# Patient Record
Sex: FEMALE | Race: WHITE | NOT HISPANIC OR LATINO | Employment: OTHER | URBAN - METROPOLITAN AREA
[De-identification: names, ages, dates, MRNs, and addresses within clinical notes are randomized per-mention and may not be internally consistent; named-entity substitution may affect disease eponyms.]

---

## 2017-08-14 ENCOUNTER — TELEPHONE (OUTPATIENT)
Dept: FAMILY MEDICINE | Facility: CLINIC | Age: 57
End: 2017-08-14

## 2017-08-14 NOTE — TELEPHONE ENCOUNTER
8/14/2017    Call Regarding Preventive Health Screening Mammogram    Attempt 1    Message on voicemail     Comments:           Outreach   Ileana Lopez

## 2017-08-30 ENCOUNTER — OFFICE VISIT (OUTPATIENT)
Dept: FAMILY MEDICINE | Facility: CLINIC | Age: 57
End: 2017-08-30
Payer: COMMERCIAL

## 2017-08-30 ENCOUNTER — HOSPITAL ENCOUNTER (OUTPATIENT)
Dept: MAMMOGRAPHY | Facility: CLINIC | Age: 57
Discharge: HOME | End: 2017-08-30
Attending: PHYSICIAN ASSISTANT | Admitting: PHYSICIAN ASSISTANT
Payer: COMMERCIAL

## 2017-08-30 VITALS
RESPIRATION RATE: 18 BRPM | TEMPERATURE: 98.7 F | BODY MASS INDEX: 35.61 KG/M2 | HEART RATE: 88 BPM | SYSTOLIC BLOOD PRESSURE: 122 MMHG | HEIGHT: 68 IN | WEIGHT: 235 LBS | OXYGEN SATURATION: 98 % | DIASTOLIC BLOOD PRESSURE: 82 MMHG

## 2017-08-30 DIAGNOSIS — Z98.84 S/P GASTRIC BYPASS: ICD-10-CM

## 2017-08-30 DIAGNOSIS — Z12.31 ENCOUNTER FOR SCREENING MAMMOGRAM FOR BREAST CANCER: ICD-10-CM

## 2017-08-30 DIAGNOSIS — Z12.11 SPECIAL SCREENING FOR MALIGNANT NEOPLASMS, COLON: ICD-10-CM

## 2017-08-30 DIAGNOSIS — D51.9 ANEMIA DUE TO VITAMIN B12 DEFICIENCY, UNSPECIFIED B12 DEFICIENCY TYPE: ICD-10-CM

## 2017-08-30 DIAGNOSIS — E66.01 SEVERE OBESITY (BMI 35.0-39.9): ICD-10-CM

## 2017-08-30 DIAGNOSIS — Z00.00 ENCOUNTER FOR GENERAL ADULT MEDICAL EXAMINATION WITHOUT ABNORMAL FINDINGS: Primary | ICD-10-CM

## 2017-08-30 DIAGNOSIS — I10 ESSENTIAL HYPERTENSION WITH GOAL BLOOD PRESSURE LESS THAN 140/90: ICD-10-CM

## 2017-08-30 PROCEDURE — G0202 SCR MAMMO BI INCL CAD: HCPCS

## 2017-08-30 PROCEDURE — 99396 PREV VISIT EST AGE 40-64: CPT | Performed by: PHYSICIAN ASSISTANT

## 2017-08-30 RX ORDER — LISINOPRIL 10 MG/1
10 TABLET ORAL DAILY
Qty: 90 TABLET | Refills: 3 | Status: SHIPPED | OUTPATIENT
Start: 2017-08-30 | End: 2018-09-27

## 2017-08-30 RX ORDER — CYANOCOBALAMIN 1000 UG/ML
INJECTION, SOLUTION INTRAMUSCULAR; SUBCUTANEOUS
Qty: 1 ML | Refills: 11 | Status: SHIPPED | OUTPATIENT
Start: 2017-08-30 | End: 2018-09-11

## 2017-08-30 ASSESSMENT — PAIN SCALES - GENERAL: PAINLEVEL: NO PAIN (0)

## 2017-08-30 NOTE — MR AVS SNAPSHOT
After Visit Summary   8/30/2017    Kristine Cox    MRN: [id number]           Patient Information     Date Of Birth          1960        Visit Information        Provider Department      8/30/2017 2:30 PM Suzanna Esparza PA-C [facility]        Today's Diagnoses     Encounter for screening mammogram for breast cancer    -  1    Essential hypertension with goal blood pressure less than 140/90        Anemia due to vitamin B12 deficiency, unspecified B12 deficiency type        S/P gastric bypass        Special screening for malignant neoplasms, colon          Care Instructions      Preventive Health Recommendations  Female Ages 50 - 64    Yearly exam: See your health care provider every year in order to  o Review health changes.   o Discuss preventive care.    o Review your medicines if your doctor has prescribed any.      Get a Pap test every three years (unless you have an abnormal result and your provider advises testing more often).    If you get Pap tests with HPV test, you only need to test every 5 years, unless you have an abnormal result.     You do not need a Pap test if your uterus was removed (hysterectomy) and you have not had cancer.    You should be tested each year for STDs (sexually transmitted diseases) if you're at risk.     Have a mammogram every 1 to 2 years.    Have a colonoscopy at age 50, or have a yearly FIT test (stool test). These exams screen for colon cancer.      Have a cholesterol test every 5 years, or more often if advised.    Have a diabetes test (fasting glucose) every three years. If you are at risk for diabetes, you should have this test more often.     If you are at risk for osteoporosis (brittle bone disease), think about having a bone density scan (DEXA).    Shots: Get a flu shot each year. Get a tetanus shot every 10 years.    Nutrition:     Eat at least 5 servings of fruits and vegetables each day.    Eat whole-grain bread, whole-wheat  pasta and brown rice instead of white grains and rice.    Talk to your provider about Calcium and Vitamin D.     Lifestyle    Exercise at least 150 minutes a week (30 minutes a day, 5 days a week). This will help you control your weight and prevent disease.    Limit alcohol to one drink per day.    No smoking.     Wear sunscreen to prevent skin cancer.     See your dentist every six months for an exam and cleaning.    See your eye doctor every 1 to 2 years.            Follow-ups after your visit        Additional Services     GASTROENTEROLOGY ADULT REF PROCEDURE ONLY       Last Lab Result: Creatinine (mg/dL)       Date                     Value                 10/18/2016               0.63             ----------  Body mass index is 35.73 kg/(m^2).     Needed:  No  Language:  English    Patient will be contacted to schedule procedure.     Please be aware that coverage of these services is subject to the terms and limitations of your health insurance plan.  Call member services at your health plan with any benefit or coverage questions.  Any procedures must be performed at a [facility] facility OR coordinated by your clinic's referral office.    Please bring the following with you to your appointment:    (1) Any X-Rays, CTs or MRIs which have been performed.  Contact the facility where they were done to arrange for  prior to your scheduled appointment.    (2) List of current medications   (3) This referral request   (4) Any documents/labs given to you for this referral                  Your next 10 appointments already scheduled     Aug 30, 2017  3:30 PM CDT   MA SCREENING DIGITAL BILATERAL with PHMA1   [facility] ([facility])    [address] [place] [id number] [phone]           Do not use any powder, lotion or deodorant under your arms or on your breast. If you do, we will ask you to remove it before your exam.  Wear comfortable, two-piece clothing.  " If you have any allergies, tell your care team.  Bring any previous mammograms from other facilities or have them mailed to the breast center.              Future tests that were ordered for you today     Open Future Orders        Priority Expected Expires Ordered    *MA Screening Digital Bilateral Routine  2018            Who to contact     If you have questions or need follow up information about today's clinic visit or your schedule please contact [facility] directly at [phone].  Normal or non-critical lab and imaging results will be communicated to you by [employer], letter or phone within 4 business days after the clinic has received the results. If you do not hear from us within 7 days, please contact the clinic through [employer] or phone. If you have a critical or abnormal lab result, we will notify you by phone as soon as possible.  Submit refill requests through [employer] or call your pharmacy and they will forward the refill request to us. Please allow 3 business days for your refill to be completed.          Additional Information About Your Visit        [employer] Information     [employer] lets you send messages to your doctor, view your test results, renew your prescriptions, schedule appointments and more. To sign up, go to [email or web address] . Click on \"Log in\" on the left side of the screen, which will take you to the Welcome page. Then click on \"Sign up Now\" on the right side of the page.     You will be asked to enter the access code listed below, as well as some personal information. Please follow the directions to create your username and password.     Your access code is: [email or web address]  Expires: 2017  3:05 PM     Your access code will  in 90 days. If you need help or a new code, please call your [place] clinic or [phone].        Care EveryWhere ID     This is your Care EveryWhere ID. This could be used by other organizations to access your [place] " "medical records  [id number]        Your Vitals Were     Pulse Temperature Respirations Height Pulse Oximetry BMI (Body Mass Index)    88 98.7  F (37.1  C) (Tympanic) 18 5' 8\" (1.727 m) 98% 35.73 kg/m2       Blood Pressure from Last 3 Encounters:   08/30/17 122/82   10/25/16 138/74   10/10/16 134/82    Weight from Last 3 Encounters:   08/30/17 235 lb (106.6 kg)   10/10/16 227 lb (103 kg)   09/08/15 216 lb (98 kg)              We Performed the Following     GASTROENTEROLOGY ADULT REF PROCEDURE ONLY          Where to get your medicines      These medications were sent to [facility] [place], [place] - [address]  [address] , [facility] [address]     Phone:  [phone]     cyanocobalamin 1000 MCG/ML injection    lisinopril 10 MG tablet          Primary Care Provider Office Phone # Fax #    Suzanna Esparza PA-C [phone] [address] [place] [address]        Equal Access to Services     [facility]: Hadii aad ku hadasho Soomaali, waaxda luqadaha, qaybta kaalmada adeegyada, kingston alonso . So [facility] [phone].    ATENCIÓN: Si habla español, tiene a mills disposición servicios gratuitos de asistencia lingüística. JuanK[facility]tering Health [facility] [phone].    We comply with applicable federal civil rights laws and [place] laws. We do not discriminate on the basis of race, color, national origin, age, disability sex, sexual orientation or gender identity.            Thank you!     Thank you for choosing [facility]  for your care. Our goal is always to provide you with excellent care. Hearing back from our patients is one way we can continue to improve our services. Please take a few minutes to complete the written survey that you may receive in the mail after your visit with us. Thank you!             Your Updated Medication List - Protect others around you: Learn how to safely use, store and throw away your medicines at www.disposemymeds.org.        "   This list is accurate as of: 8/30/17  3:05 PM.  Always use your most recent med list.                   Brand Name Dispense Instructions for use Diagnosis    aspirin 81 MG tablet      Take 1 tablet by mouth daily.        calcium-vitamin D 600-400 MG-UNIT per tablet    CALTRATE     Take 2 tablets by mouth daily.        cholecalciferol 1000 UNITS capsule    vitamin  -D     Take 2 capsules by mouth daily.        cyanocobalamin 1000 MCG/ML injection    VITAMIN B12    1 mL    INJECT 1ML AS DIRECTED ONCE EVERY 30 DAYS    Anemia due to vitamin B12 deficiency, unspecified B12 deficiency type, S/P gastric bypass       docusate sodium 100 MG capsule    COLACE    30 capsule    Take 1 capsule (100 mg) by mouth daily    Constipation       lisinopril 10 MG tablet    PRINIVIL/ZESTRIL    90 tablet    Take 1 tablet (10 mg) by mouth daily    Essential hypertension with goal blood pressure less than 140/90

## 2017-08-30 NOTE — NURSING NOTE
"Chief Complaint   Patient presents with     Physical       Initial /82  Pulse 88  Temp 98.7  F (37.1  C) (Tympanic)  Resp 18  Ht 5' 8\" (1.727 m)  Wt 235 lb (106.6 kg)  SpO2 98%  BMI 35.73 kg/m2 Estimated body mass index is 35.73 kg/(m^2) as calculated from the following:    Height as of this encounter: 5' 8\" (1.727 m).    Weight as of this encounter: 235 lb (106.6 kg).  BP completed using cuff size: yash Patiño MA      "

## 2017-08-30 NOTE — PROGRESS NOTES
SUBJECTIVE:   CC: Kristine Cox is an 57 year old woman who presents for preventive health visit.     Healthy Habits:    Do you get at least three servings of calcium containing foods daily (dairy, green leafy vegetables, etc.)? no, taking calcium and/or vitamin D supplement: yes -     Amount of exercise or daily activities, outside of work: none    Problems taking medications regularly No    Medication side effects: No    Have you had an eye exam in the past two years? yes    Do you see a dentist twice per year? no    Do you have sleep apnea, excessive snoring or daytime drowsiness?no          Hypertension Follow-up  On lisinopril 10 mg daily-no side effects to the medication-stable.      Outpatient blood pressures are not being checked.    Low Salt Diet: not monitoring salt    ANEMIA B-12 DEFICIENCY  As had ongoing issues with anemia due to history of gastric bypass. Does self injections monthly of vitamin B 12. Due for labs today.      Today's PHQ-2 Score:   PHQ-2 ( 1999 Pfizer) 8/30/2017 6/23/2015   Q1: Little interest or pleasure in doing things 0 3   Q2: Feeling down, depressed or hopeless 0 2   PHQ-2 Score 0 5         Abuse: Current or Past(Physical, Sexual or Emotional)- No  Do you feel safe in your environment - Yes  Social History   Substance Use Topics     Smoking status: Current Every Day Smoker     Packs/day: 0.50     Smokeless tobacco: Never Used     Alcohol use Yes      Comment: 4 -6 drinks daily     The patient does not drink >3 drinks per day nor >7 drinks per week.    Reviewed orders with patient.  Reviewed health maintenance and updated orders accordingly - Yes  Patient Active Problem List   Diagnosis     CARDIOVASCULAR SCREENING; LDL GOAL LESS THAN 160     HTN, goal below 140/90     Vaginal atrophy     S/P gastric bypass     History of cervical spinal surgery     Lesion of sciatic nerve, right     Lumbosacral radiculopathy     Cervicalgia     B12 deficiency anemia     Lumbar foraminal  stenosis     Protruded cervical disc     Piriformis syndrome, right     Low serum sodium     Tobacco abuse     Severe obesity (BMI 35.0-39.9) (H)     Past Surgical History:   Procedure Laterality Date     C EXCIS KNEE CARTILAGE,MEDIAL OR LAT  07/02/07    right knee     CHOLECYSTECTOMY       COLONOSCOPY  9/20/2011    Procedure:COMBINED COLONOSCOPY, REMOVE TUMOR/POLYP/LESION BY SNARE; Colonoscopy with polypectomy by snare; Surgeon:TERESO LIANG; Location:[facility]     DAVINCI BYPASS GASTRIC JOSHUA-EN-Y  1993     DISCECTOMY CERVICAL MINIMALLY INVASIVE TWO LEVELS       FUSION CERVICAL ANTERIOR TWO LEVELS      C5-7     HC LIGMT REVISION,KNEE,INTRA-ARTIC  07/02/07     HYSTERECTOMY VAGINAL       TONSILLECTOMY         Social History   Substance Use Topics     Smoking status: Current Every Day Smoker     Packs/day: 0.50     Smokeless tobacco: Never Used     Alcohol use Yes      Comment: 4 -6 drinks daily     Family History   Problem Relation Age of Onset     HEART DISEASE Father      heart murmur and CHF     Hypertension Brother      HEART DISEASE Brother      stents     Thyroid Disease Mother      hypothyroid     DIABETES Mother      Hypertension Sister              Patient over age 50, mutual decision to screen reflected in health maintenance.      Pertinent mammograms are reviewed under the imaging tab.  History of abnormal Pap smear: Status post benign hysterectomy. Health Maintenance and Surgical History updated.    [facility]  [facility]         Reviewed and updated as needed this visit by Provider            ROS:  C: NEGATIVE for fever, chills, change in weight  I: NEGATIVE for worrisome rashes, moles or lesions  E: NEGATIVE for vision changes or irritation  ENT: NEGATIVE for ear, mouth and throat problems  R: NEGATIVE for significant cough or SOB  B: NEGATIVE for masses, tenderness or discharge  CV: NEGATIVE for chest pain, palpitations or peripheral edema  GI:  "NEGATIVE for nausea, abdominal pain, heartburn, or change in bowel habits  : NEGATIVE for unusual urinary or vaginal symptoms. No vaginal bleeding.  M: NEGATIVE for significant arthralgias or myalgia  N: NEGATIVE for weakness, dizziness or paresthesias  E: NEGATIVE for temperature intolerance, skin/hair changes  H: NEGATIVE for bleeding problems  P: NEGATIVE for changes in mood or affect     OBJECTIVE:   /82  Pulse 88  Temp 98.7  F (37.1  C) (Tympanic)  Resp 18  Ht 5' 8\" (1.727 m)  Wt 235 lb (106.6 kg)  SpO2 98%  BMI 35.73 kg/m2  EXAM:  GENERAL: alert, no distress and obese  EYES: Eyes grossly normal to inspection, PERRL and conjunctivae and sclerae normal  HENT: ear canals and TM's normal, nose and mouth without ulcers or lesions  NECK: no adenopathy, no asymmetry, masses, or scars and thyroid normal to palpation  RESP: lungs clear to auscultation - no rales, rhonchi or wheezes  BREAST: Breast exam declined by patient today.    CV: regular rate and rhythm, normal S1 S2, no S3 or S4, no murmur, click or rub, no peripheral edema and peripheral pulses strong  ABDOMEN: soft, nontender, no hepatosplenomegaly, no masses and bowel sounds normal   (female): deferred  MS: no gross musculoskeletal defects noted, no edema  SKIN: no suspicious lesions or rashes  NEURO: Normal strength and tone, mentation intact and speech normal  PSYCH: mentation appears normal, affect normal/bright    ASSESSMENT/PLAN:       ICD-10-CM    1. Encounter for general adult medical examination without abnormal findings Z00.00    2. Essential hypertension with goal blood pressure less than 140/90 I10 lisinopril (PRINIVIL/ZESTRIL) 10 MG tablet     Comprehensive metabolic panel     CBC with platelets differential     Vitamin D Deficiency     Vitamin B12     Ferritin     Iron     Albumin Random Urine Quantitative with Creat Ratio   3. Anemia due to vitamin B12 deficiency, unspecified B12 deficiency type D51.9 cyanocobalamin (VITAMIN " "B12) 1000 MCG/ML injection   4. S/P gastric bypass Z98.84 cyanocobalamin (VITAMIN B12) 1000 MCG/ML injection     Comprehensive metabolic panel     CBC with platelets differential     Vitamin D Deficiency     Vitamin B12     Ferritin     Iron     Albumin Random Urine Quantitative with Creat Ratio   5. Encounter for screening mammogram for breast cancer Z12.31 *MA Screening Digital Bilateral   6. Special screening for malignant neoplasms, colon Z12.11 GASTROENTEROLOGY ADULT REF PROCEDURE ONLY   7. Severe obesity (BMI 35.0-39.9) (H) E66.01        COUNSELING:   Reviewed preventive health counseling, as reflected in patient instructions       Regular exercise       Healthy diet/nutrition       Osteoporosis Prevention/Bone Health         reports that she has been smoking.  She has been smoking about 0.50 packs per day. She has never used smokeless tobacco.  Tobacco Cessation Action Plan: Information offered: Patient not interested at this time  Estimated body mass index is 34.26 kg/(m^2) as calculated from the following:    Height as of 9/8/15: 5' 8.25\" (1.734 m).    Weight as of 10/10/16: 227 lb (103 kg).   Weight management plan: Discussed healthy diet and exercise guidelines and patient will follow up in 12 months in clinic to re-evaluate.     Refill lisinopril and vitamin B-12 injections. Patient is not fasting today, suggested she return for fasting labs so that these are more accurate.    Counseling Resources:  ATP IV Guidelines  Pooled Cohorts Equation Calculator  Breast Cancer Risk Calculator  FRAX Risk Assessment  ICSI Preventive Guidelines  Dietary Guidelines for Americans, 2010  USDA's MyPlate  ASA Prophylaxis  Lung CA Screening    Suzanna Esparza PA-C  [facility]    Orders Placed This Encounter     *MA Screening Digital Bilateral     Comprehensive metabolic panel     CBC with platelets differential     Vitamin D Deficiency     Vitamin B12     Ferritin     Iron     Albumin Random Urine " Quantitative with Creat Ratio     GASTROENTEROLOGY ADULT REF PROCEDURE ONLY     lisinopril (PRINIVIL/ZESTRIL) 10 MG tablet     cyanocobalamin (VITAMIN B12) 1000 MCG/ML injection       AVS given to patient upon discharge today.  Electronically signed by Suzanna Esparza PA-C  August 30, 2017  5:25 PM

## 2017-08-31 ENCOUNTER — TELEPHONE (OUTPATIENT)
Dept: FAMILY MEDICINE | Facility: CLINIC | Age: 57
End: 2017-08-31

## 2017-08-31 DIAGNOSIS — Z12.11 SPECIAL SCREENING FOR MALIGNANT NEOPLASMS, COLON: Primary | ICD-10-CM

## 2017-08-31 NOTE — TELEPHONE ENCOUNTER
Reason for Call: Request for an order or referral:    Order or referral being requested: There is a colonoscopy order placed but patient has [employer] insurance and she wouldn't get as good of coverage seeing [facility]- Can the order be changed to see a [place] Provider?    Date needed: as soon as possible    Has the patient been seen by the PCP for this problem? YES    Additional comments: none    Phone number Patient can be reached at:  Home number on file [phone] (home)    Best Time:  any    Can we leave a detailed message on this number?  YES    Call taken on 8/31/2017 at 11:36 AM by Neli Yang

## 2017-09-01 DIAGNOSIS — I10 ESSENTIAL HYPERTENSION WITH GOAL BLOOD PRESSURE LESS THAN 140/90: ICD-10-CM

## 2017-09-01 DIAGNOSIS — Z98.84 S/P GASTRIC BYPASS: ICD-10-CM

## 2017-09-01 LAB
ALBUMIN SERPL-MCNC: 3.9 G/DL (ref 3.4–5)
ALP SERPL-CCNC: 105 U/L (ref 40–150)
ALT SERPL W P-5'-P-CCNC: 20 U/L (ref 0–50)
ANION GAP SERPL CALCULATED.3IONS-SCNC: 11 MMOL/L (ref 3–14)
AST SERPL W P-5'-P-CCNC: 22 U/L (ref 0–45)
BASOPHILS # BLD AUTO: 0.1 10E9/L (ref 0–0.2)
BASOPHILS NFR BLD AUTO: 1.2 %
BILIRUB SERPL-MCNC: 0.5 MG/DL (ref 0.2–1.3)
BUN SERPL-MCNC: 6 MG/DL (ref 7–30)
CALCIUM SERPL-MCNC: 9.5 MG/DL (ref 8.5–10.1)
CHLORIDE SERPL-SCNC: 99 MMOL/L (ref 94–109)
CO2 SERPL-SCNC: 27 MMOL/L (ref 20–32)
CREAT SERPL-MCNC: 0.64 MG/DL (ref 0.52–1.04)
CREAT UR-MCNC: 30 MG/DL
DEPRECATED CALCIDIOL+CALCIFEROL SERPL-MC: 36 UG/L (ref 20–75)
DIFFERENTIAL METHOD BLD: NORMAL
EOSINOPHIL # BLD AUTO: 0.3 10E9/L (ref 0–0.7)
EOSINOPHIL NFR BLD AUTO: 4.8 %
ERYTHROCYTE [DISTWIDTH] IN BLOOD BY AUTOMATED COUNT: 12.4 % (ref 10–15)
FERRITIN SERPL-MCNC: 19 NG/ML (ref 8–252)
GFR SERPL CREATININE-BSD FRML MDRD: >90 ML/MIN/1.7M2
GLUCOSE SERPL-MCNC: 89 MG/DL (ref 70–99)
HCT VFR BLD AUTO: 42 % (ref 35–47)
HGB BLD-MCNC: 14 G/DL (ref 11.7–15.7)
IRON SERPL-MCNC: 173 UG/DL (ref 35–180)
LYMPHOCYTES # BLD AUTO: 1.9 10E9/L (ref 0.8–5.3)
LYMPHOCYTES NFR BLD AUTO: 31.8 %
MCH RBC QN AUTO: 30.8 PG (ref 26.5–33)
MCHC RBC AUTO-ENTMCNC: 33.3 G/DL (ref 31.5–36.5)
MCV RBC AUTO: 92 FL (ref 78–100)
MICROALBUMIN UR-MCNC: <5 MG/L
MICROALBUMIN/CREAT UR: NORMAL MG/G CR (ref 0–25)
MONOCYTES # BLD AUTO: 0.4 10E9/L (ref 0–1.3)
MONOCYTES NFR BLD AUTO: 6 %
NEUTROPHILS # BLD AUTO: 3.4 10E9/L (ref 1.6–8.3)
NEUTROPHILS NFR BLD AUTO: 56.2 %
PLATELET # BLD AUTO: 302 10E9/L (ref 150–450)
POTASSIUM SERPL-SCNC: 4.3 MMOL/L (ref 3.4–5.3)
PROT SERPL-MCNC: 7.7 G/DL (ref 6.8–8.8)
RBC # BLD AUTO: 4.55 10E12/L (ref 3.8–5.2)
SODIUM SERPL-SCNC: 137 MMOL/L (ref 133–144)
VIT B12 SERPL-MCNC: 487 PG/ML (ref 193–986)
WBC # BLD AUTO: 6 10E9/L (ref 4–11)

## 2017-09-01 PROCEDURE — 82043 UR ALBUMIN QUANTITATIVE: CPT | Performed by: PHYSICIAN ASSISTANT

## 2017-09-01 PROCEDURE — 82306 VITAMIN D 25 HYDROXY: CPT | Performed by: PHYSICIAN ASSISTANT

## 2017-09-01 PROCEDURE — 83540 ASSAY OF IRON: CPT | Performed by: PHYSICIAN ASSISTANT

## 2017-09-01 PROCEDURE — 36415 COLL VENOUS BLD VENIPUNCTURE: CPT | Performed by: PHYSICIAN ASSISTANT

## 2017-09-01 PROCEDURE — 80053 COMPREHEN METABOLIC PANEL: CPT | Performed by: PHYSICIAN ASSISTANT

## 2017-09-01 PROCEDURE — 82728 ASSAY OF FERRITIN: CPT | Performed by: PHYSICIAN ASSISTANT

## 2017-09-01 PROCEDURE — 85025 COMPLETE CBC W/AUTO DIFF WBC: CPT | Performed by: PHYSICIAN ASSISTANT

## 2017-09-01 PROCEDURE — 82607 VITAMIN B-12: CPT | Performed by: PHYSICIAN ASSISTANT

## 2017-09-06 ENCOUNTER — TELEPHONE (OUTPATIENT)
Dept: FAMILY MEDICINE | Facility: CLINIC | Age: 57
End: 2017-09-06

## 2017-09-18 ENCOUNTER — ANESTHESIA EVENT (OUTPATIENT)
Dept: GASTROENTEROLOGY | Facility: CLINIC | Age: 57
End: 2017-09-18
Payer: COMMERCIAL

## 2017-09-18 ENCOUNTER — ANESTHESIA (OUTPATIENT)
Dept: GASTROENTEROLOGY | Facility: CLINIC | Age: 57
End: 2017-09-18
Payer: COMMERCIAL

## 2017-09-18 ENCOUNTER — SURGERY (OUTPATIENT)
Age: 57
End: 2017-09-18

## 2017-09-18 ENCOUNTER — HOSPITAL ENCOUNTER (OUTPATIENT)
Facility: CLINIC | Age: 57
Discharge: HOME | End: 2017-09-18
Attending: FAMILY MEDICINE | Admitting: FAMILY MEDICINE
Payer: COMMERCIAL

## 2017-09-18 VITALS
SYSTOLIC BLOOD PRESSURE: 133 MMHG | RESPIRATION RATE: 20 BRPM | TEMPERATURE: 97.5 F | DIASTOLIC BLOOD PRESSURE: 86 MMHG | OXYGEN SATURATION: 100 %

## 2017-09-18 LAB — COLONOSCOPY: NORMAL

## 2017-09-18 PROCEDURE — 45385 COLONOSCOPY W/LESION REMOVAL: CPT | Performed by: FAMILY MEDICINE

## 2017-09-18 PROCEDURE — 25000128 H RX IP 250 OP 636: Performed by: NURSE ANESTHETIST, CERTIFIED REGISTERED

## 2017-09-18 PROCEDURE — 45380 COLONOSCOPY AND BIOPSY: CPT | Performed by: FAMILY MEDICINE

## 2017-09-18 PROCEDURE — 45385 COLONOSCOPY W/LESION REMOVAL: CPT | Mod: PT | Performed by: FAMILY MEDICINE

## 2017-09-18 PROCEDURE — 25000125 ZZHC RX 250: Performed by: NURSE ANESTHETIST, CERTIFIED REGISTERED

## 2017-09-18 PROCEDURE — 45380 COLONOSCOPY AND BIOPSY: CPT | Mod: 59 | Performed by: FAMILY MEDICINE

## 2017-09-18 PROCEDURE — 88305 TISSUE EXAM BY PATHOLOGIST: CPT | Performed by: FAMILY MEDICINE

## 2017-09-18 PROCEDURE — 25000125 ZZHC RX 250: Performed by: FAMILY MEDICINE

## 2017-09-18 PROCEDURE — 40000296 ZZH STATISTIC ENDO RECOVERY CLASS 1:2 FIRST HOUR: Performed by: FAMILY MEDICINE

## 2017-09-18 PROCEDURE — 88305 TISSUE EXAM BY PATHOLOGIST: CPT | Mod: 26 | Performed by: FAMILY MEDICINE

## 2017-09-18 RX ORDER — SODIUM CHLORIDE, SODIUM LACTATE, POTASSIUM CHLORIDE, CALCIUM CHLORIDE 600; 310; 30; 20 MG/100ML; MG/100ML; MG/100ML; MG/100ML
INJECTION, SOLUTION INTRAVENOUS CONTINUOUS
Status: DISCONTINUED | OUTPATIENT
Start: 2017-09-18 | End: 2017-09-18 | Stop reason: HOSPADM

## 2017-09-18 RX ORDER — ONDANSETRON 4 MG/1
4 TABLET, ORALLY DISINTEGRATING ORAL EVERY 30 MIN PRN
Status: DISCONTINUED | OUTPATIENT
Start: 2017-09-18 | End: 2017-09-18 | Stop reason: HOSPADM

## 2017-09-18 RX ORDER — LIDOCAINE 40 MG/G
CREAM TOPICAL
Status: DISCONTINUED | OUTPATIENT
Start: 2017-09-18 | End: 2017-09-18 | Stop reason: HOSPADM

## 2017-09-18 RX ORDER — PROPOFOL 10 MG/ML
INJECTION, EMULSION INTRAVENOUS PRN
Status: DISCONTINUED | OUTPATIENT
Start: 2017-09-18 | End: 2017-09-18

## 2017-09-18 RX ORDER — ONDANSETRON 2 MG/ML
4 INJECTION INTRAMUSCULAR; INTRAVENOUS EVERY 30 MIN PRN
Status: DISCONTINUED | OUTPATIENT
Start: 2017-09-18 | End: 2017-09-18 | Stop reason: HOSPADM

## 2017-09-18 RX ORDER — LIDOCAINE HYDROCHLORIDE 20 MG/ML
INJECTION, SOLUTION INFILTRATION; PERINEURAL PRN
Status: DISCONTINUED | OUTPATIENT
Start: 2017-09-18 | End: 2017-09-18

## 2017-09-18 RX ORDER — ONDANSETRON 2 MG/ML
4 INJECTION INTRAMUSCULAR; INTRAVENOUS
Status: DISCONTINUED | OUTPATIENT
Start: 2017-09-18 | End: 2017-09-18 | Stop reason: HOSPADM

## 2017-09-18 RX ORDER — PROPOFOL 10 MG/ML
INJECTION, EMULSION INTRAVENOUS CONTINUOUS PRN
Status: DISCONTINUED | OUTPATIENT
Start: 2017-09-18 | End: 2017-09-18

## 2017-09-18 RX ORDER — FENTANYL CITRATE 50 UG/ML
25-50 INJECTION, SOLUTION INTRAMUSCULAR; INTRAVENOUS
Status: DISCONTINUED | OUTPATIENT
Start: 2017-09-18 | End: 2017-09-18 | Stop reason: HOSPADM

## 2017-09-18 RX ADMIN — LIDOCAINE HYDROCHLORIDE 1 ML: 10 INJECTION, SOLUTION EPIDURAL; INFILTRATION; INTRACAUDAL; PERINEURAL at 11:25

## 2017-09-18 RX ADMIN — SODIUM CHLORIDE, POTASSIUM CHLORIDE, SODIUM LACTATE AND CALCIUM CHLORIDE: 600; 310; 30; 20 INJECTION, SOLUTION INTRAVENOUS at 11:25

## 2017-09-18 RX ADMIN — PROPOFOL 150 MCG/KG/MIN: 10 INJECTION, EMULSION INTRAVENOUS at 11:37

## 2017-09-18 RX ADMIN — PROPOFOL 50 MG: 10 INJECTION, EMULSION INTRAVENOUS at 11:37

## 2017-09-18 RX ADMIN — LIDOCAINE HYDROCHLORIDE 80 MG: 20 INJECTION, SOLUTION INFILTRATION; PERINEURAL at 11:37

## 2017-09-18 ASSESSMENT — LIFESTYLE VARIABLES: TOBACCO_USE: 1

## 2017-09-18 NOTE — ANESTHESIA PREPROCEDURE EVALUATION
"  Anesthesia Evaluation     . Pt has had prior anesthetic. Type: General and MAC           ROS/MED HX    ENT/Pulmonary:     (+)tobacco use, Current use , . .    Neurologic:  - neg neurologic ROS     Cardiovascular:     (+) Dyslipidemia, hypertension----. : . . . :. . No previous cardiac testing       METS/Exercise Tolerance:     Hematologic:     (+) Anemia, -      Musculoskeletal: Comment: Previous 2-level cervical fusion  (+) arthritis, , , other musculoskeletal- lumbar/sacral pain/cervical pain      GI/Hepatic:     (+) GERD (heartburn 3-4 times per week, no daily meds for this)       Renal/Genitourinary:  - ROS Renal section negative   (+) Pt has no history of transplant,       Endo:     (+) Obesity, .      Psychiatric:  - neg psychiatric ROS       Infectious Disease:  - neg infectious disease ROS       Malignancy:      - no malignancy   Other:    (+) No chance of pregnancy C-spine cleared: No, no H/O Chronic Pain,                   Physical Exam  Normal systems: cardiovascular, pulmonary and dental    Airway   Mallampati: II  TM distance: >3 FB  Neck ROM: full  Comment: ful ROM but pt stated she sees \"sparklies\" if she holds her head at \"extreme\" limits for an extended amount of time    Dental     Cardiovascular   Rhythm and rate: regular and normal      Pulmonary    breath sounds clear to auscultation                    Anesthesia Plan      History & Physical Review      ASA Status:  3 .    NPO Status:  > 8 hours    Plan for MAC with Intravenous induction. Maintenance will be TIVA.  Reason for MAC:  Deep or markedly invasive procedure (G8)  PONV prophylaxis:  Ondansetron (or other 5HT-3)  Dr James performed H&P in the room      Postoperative Care  Postoperative pain management:  Oral pain medications.      Consents  Anesthetic plan, risks, benefits and alternatives discussed with:  Patient.  Use of blood products discussed: No .   .                          .  "

## 2017-09-18 NOTE — ANESTHESIA POSTPROCEDURE EVALUATION
Patient: Kristine Cox    Procedure(s):  Colonoscopy with polypectomy by snare and biopsy. - Wound Class: II-Clean Contaminated   - Wound Class: II-Clean Contaminated    Diagnosis:screening  Diagnosis Additional Information: No value filed.    Anesthesia Type:  MAC    Note:  Anesthesia Post Evaluation    Patient location during evaluation: Phase 2  Patient participation: Able to fully participate in evaluation  Level of consciousness: awake and alert  Pain management: adequate  Cardiovascular status: blood pressure returned to baseline  Respiratory status: spontaneous ventilation and room air  Hydration status: acceptable  PONV: none     Anesthetic complications: None    Comments: Patient happy with her anesthetic.  No anesthesia complications or concerns         Last vitals:  Vitals:    09/18/17 1112 09/18/17 1247 09/18/17 1300   BP: 147/75 135/79 144/78   Resp: 18 16 20   Temp: 97.5  F (36.4  C)     SpO2: 98% 99% 100%         Electronically Signed By: LUZ Churchill CRNA  September 18, 2017  1:09 PM

## 2017-09-18 NOTE — OR NURSING
Verbal report received from Chikis REY RN and Alba MYERS. GI Phase 2 recovery assumed by Nhi DEY. Pt post-op MAC for colonoscopy per . Pt awake and stable upon arrival. Spouse in room and supportive.

## 2017-09-18 NOTE — IP AVS SNAPSHOT
MRN:[id number]                      After Visit Summary   9/18/2017    Kristine Cox    MRN: [id number]           Thank you!     Thank you for choosing [facility] for your care. Our goal is always to provide you with excellent care. Hearing back from our patients is one way we can continue to improve our services. Please take a few minutes to complete the written survey that you may receive in the mail after you visit with us. Thank you!        Patient Information     Date Of Birth          1960        About your hospital stay     You were admitted on:  September 18, 2017 You last received care in the:  [facility]    You were discharged on:  September 18, 2017       Who to Call     For medical emergencies, please call 911.  For non-urgent questions about your medical care, please call your primary care provider or clinic, [phone]  For questions related to your surgery, please call your surgery clinic        Attending Provider     Provider Cale Kellogg MD Family Practice       Primary Care Provider Office Phone # Fax #    Suzanna Esparza PA-C [phone]      Further instructions from your care team         [facility]    Home Care Following Endoscopy    Dr. James      Activity:    You have just undergone an endoscopic procedure usually performed with conscious sedation.  Do not work or operate machinery (including a car) for at least 12 hours.      I encourage you to walk and attempt to pass this air as soon as possible.    Diet:    Return to the diet you were on before your procedure but eat lightly for the first 12-24 hours.    Drink plenty of water.    Resume any regular medications unless otherwise advised by your physician.  Please begin any new medication prescribed as a result of your procedure as directed by your physician.     If you had any biopsy or polyp removed please refrain from aspirin or aspirin  products for 2 days.  If on Coumadin please restart as instructed by your physician.   Pain:    You may take Tylenol as needed for pain.  Expected Recovery:    You can expect some mild abdominal fullness and/or discomfort due to the air used to inflate your intestinal tract. It is also normal to have a mild sore throat after upper endoscopy.    Call Your Physician if You Have:    After Upper Endoscopy:  o Shoulder, back or chest pain.  o Difficulty breathing or swallowing.  o Vomiting blood.    After Colonoscopy:  o Worsening persisting abdominal pain which is worse with activity.  o Fevers (>101 degrees F), chills or shakes.  o Passage of continued blood with bowel movements.   Any questions or concerns about your recovery, please call [phone] or after hours [phone] Nurse Advice Line.    Follow-up Care:    You should receive a call or letter with your results within 1 week. Please call if you have not received a notification of your results.    If asked to return to clinic please make an appointment 1 week after your procedure.  Call [phone].  [facility]   Adult Discharge Orders & Instructions Following Anesthesia    For 24 hours after surgery    1. Get plenty of rest.  A responsible adult must stay with you for at least 24 hours after you leave the hospital.   2. Do not drive or use heavy equipment.  If you have weakness or tingling, don't drive or use heavy equipment until this feeling goes away.  3. Do not drink alcohol.  4. Avoid strenuous or risky activities.  Ask for help when climbing stairs.   5. You may feel lightheaded.  IF so, sit for a few minutes before standing.  Have someone help you get up.   6. If you have nausea (feel sick to your stomach): Drink only clear liquids such as apple juice, ginger ale, broth or 7-Up.  Rest may also help.  Be sure to drink enough fluids.  Move to a regular diet as you feel able.  7. You may have a slight fever. Call the doctor if your fever  "is over 100 F (37.7 C) (taken under the tongue) or lasts longer than 24 hours.  8. You may have a dry mouth, a sore throat, muscle aches or trouble sleeping.  These should go away after 24 hours.  9. Do not make important or legal decisions.   Call your doctor for any of the followin.  Signs of infection (fever, growing tenderness at the surgery site, a large amount of drainage or bleeding, severe pain, foul-smelling drainage, redness, swelling).    2. It has been over 8 to 10 hours since surgery and you are still not able to urinate (pass water).        Pending Results     No orders found from 2017 to 2017.            Admission Information     Date & Time Provider Department Dept. Phone    2017 Cale James MD [facility] [phone]      Your Vitals Were     Blood Pressure Temperature Respirations Pulse Oximetry          144/78 97.5  F (36.4  C) (Oral) 20 100%        MyChart Information     [employer] lets you send messages to your doctor, view your test results, renew your prescriptions, schedule appointments and more. To sign up, go to [email or web address] . Click on \"Log in\" on the left side of the screen, which will take you to the Welcome page. Then click on \"Sign up Now\" on the right side of the page.     You will be asked to enter the access code listed below, as well as some personal information. Please follow the directions to create your username and password.     Your access code is: [email or web address]  Expires: 2017  3:05 PM     Your access code will  in 90 days. If you need help or a new code, please call your [place] clinic or [phone].        Care EveryWhere ID     This is your Care EveryWhere ID. This could be used by other organizations to access your [place] medical records  [id number]        Equal Access to Services     [facility] PRIMITIVO AH: Renuka Griffin, fausto reyes, re raphael, kingston redmond " westley glaseraan ah. So [facility] [place] [facility] [phone].    ATENCIÓN: Si kiet issa, tiene a mills disposición servicios gratuitos de asistencia lingüística. Keke al [phone].    We comply with applicable federal civil rights laws and [place] laws. We do not discriminate on the basis of race, color, national origin, age, disability sex, sexual orientation or gender identity.               Review of your medicines      UNREVIEWED medicines. Ask your doctor about these medicines        Dose / Directions    aspirin 81 MG tablet        Dose:  1 tablet   Take 1 tablet by mouth daily.   Refills:  0       calcium-vitamin D 600-400 MG-UNIT per tablet   Commonly known as:  CALTRATE        Dose:  2 tablet   Take 2 tablets by mouth daily.   Refills:  0       cholecalciferol 1000 UNITS capsule   Commonly known as:  vitamin  -D        Dose:  2 capsule   Take 2 capsules by mouth daily.   Refills:  0       cyanocobalamin 1000 MCG/ML injection   Commonly known as:  VITAMIN B12   Used for:  Anemia due to vitamin B12 deficiency, unspecified B12 deficiency type, S/P gastric bypass        INJECT 1ML AS DIRECTED ONCE EVERY 30 DAYS   Quantity:  1 mL   Refills:  11       docusate sodium 100 MG capsule   Commonly known as:  COLACE   Used for:  Constipation        Dose:  100 mg   Take 1 capsule (100 mg) by mouth daily   Quantity:  30 capsule   Refills:  1       lisinopril 10 MG tablet   Commonly known as:  PRINIVIL/ZESTRIL   Used for:  Essential hypertension with goal blood pressure less than 140/90        Dose:  10 mg   Take 1 tablet (10 mg) by mouth daily   Quantity:  90 tablet   Refills:  3                Protect others around you: Learn how to safely use, store and throw away your medicines at www.disposemymeds.org.             Medication List: This is a list of all your medications and when to take them. Check marks below indicate your daily home schedule. Keep this list as a reference.      Medications           Morning Afternoon Evening  Bedtime As Needed    aspirin 81 MG tablet   Take 1 tablet by mouth daily.                                calcium-vitamin D 600-400 MG-UNIT per tablet   Commonly known as:  CALTRATE   Take 2 tablets by mouth daily.                                cholecalciferol 1000 UNITS capsule   Commonly known as:  vitamin  -D   Take 2 capsules by mouth daily.                                cyanocobalamin 1000 MCG/ML injection   Commonly known as:  VITAMIN B12   INJECT 1ML AS DIRECTED ONCE EVERY 30 DAYS                                docusate sodium 100 MG capsule   Commonly known as:  COLACE   Take 1 capsule (100 mg) by mouth daily                                lisinopril 10 MG tablet   Commonly known as:  PRINIVIL/ZESTRIL   Take 1 tablet (10 mg) by mouth daily

## 2017-09-18 NOTE — IP AVS SNAPSHOT
[facility]    [address] [address]    Phone:  [phone]                                       After Visit Summary   9/18/2017    Kristine Cox    MRN: [id number]           After Visit Summary Signature Page     I have received my discharge instructions, and my questions have been answered. I have discussed any challenges I see with this plan with the nurse or doctor.    ..........................................................................................................................................  Patient/Patient Representative Signature      ..........................................................................................................................................  Patient Representative Print Name and Relationship to Patient    ..................................................               ................................................  Date                                            Time    ..........................................................................................................................................  Reviewed by Signature/Title    ...................................................              ..............................................  Date                                                            Time

## 2017-09-18 NOTE — DISCHARGE INSTRUCTIONS
[facility]    Home Care Following Endoscopy    Dr. James      Activity:    You have just undergone an endoscopic procedure usually performed with conscious sedation.  Do not work or operate machinery (including a car) for at least 12 hours.      I encourage you to walk and attempt to pass this air as soon as possible.    Diet:    Return to the diet you were on before your procedure but eat lightly for the first 12-24 hours.    Drink plenty of water.    Resume any regular medications unless otherwise advised by your physician.  Please begin any new medication prescribed as a result of your procedure as directed by your physician.     If you had any biopsy or polyp removed please refrain from aspirin or aspirin products for 2 days.  If on Coumadin please restart as instructed by your physician.   Pain:    You may take Tylenol as needed for pain.  Expected Recovery:    You can expect some mild abdominal fullness and/or discomfort due to the air used to inflate your intestinal tract. It is also normal to have a mild sore throat after upper endoscopy.    Call Your Physician if You Have:    After Upper Endoscopy:  o Shoulder, back or chest pain.  o Difficulty breathing or swallowing.  o Vomiting blood.    After Colonoscopy:  o Worsening persisting abdominal pain which is worse with activity.  o Fevers (>101 degrees F), chills or shakes.  o Passage of continued blood with bowel movements.   Any questions or concerns about your recovery, please call [phone] or after hours [phone] Nurse Advice Line.    Follow-up Care:    You should receive a call or letter with your results within 1 week. Please call if you have not received a notification of your results.    If asked to return to clinic please make an appointment 1 week after your procedure.  Call [phone].  [facility]   Adult Discharge Orders & Instructions Following Anesthesia    For 24 hours after surgery    1. Get plenty  of rest.  A responsible adult must stay with you for at least 24 hours after you leave the hospital.   2. Do not drive or use heavy equipment.  If you have weakness or tingling, don't drive or use heavy equipment until this feeling goes away.  3. Do not drink alcohol.  4. Avoid strenuous or risky activities.  Ask for help when climbing stairs.   5. You may feel lightheaded.  IF so, sit for a few minutes before standing.  Have someone help you get up.   6. If you have nausea (feel sick to your stomach): Drink only clear liquids such as apple juice, ginger ale, broth or 7-Up.  Rest may also help.  Be sure to drink enough fluids.  Move to a regular diet as you feel able.  7. You may have a slight fever. Call the doctor if your fever is over 100 F (37.7 C) (taken under the tongue) or lasts longer than 24 hours.  8. You may have a dry mouth, a sore throat, muscle aches or trouble sleeping.  These should go away after 24 hours.  9. Do not make important or legal decisions.   Call your doctor for any of the followin.  Signs of infection (fever, growing tenderness at the surgery site, a large amount of drainage or bleeding, severe pain, foul-smelling drainage, redness, swelling).    2. It has been over 8 to 10 hours since surgery and you are still not able to urinate (pass water).

## 2017-09-18 NOTE — ANESTHESIA CARE TRANSFER NOTE
Patient: Kristine Cox    Procedure(s):  Colonoscopy with polypectomy by snare and biopsy. - Wound Class: II-Clean Contaminated   - Wound Class: II-Clean Contaminated    Diagnosis: screening  Diagnosis Additional Information: No value filed.    Anesthesia Type:   MAC     Note:    Patient transferred to:Phase II        Vitals: (Last set prior to Anesthesia Care Transfer)    CRNA VITALS  9/18/2017 1214 - 9/18/2017 1250      9/18/2017             Pulse: 64    SpO2: 100 %    Resp Rate (observed): 13                Electronically Signed By: LUZ Churchill CRNA  September 18, 2017  12:50 PM

## 2017-09-20 LAB — COPATH REPORT: NORMAL

## 2018-09-11 DIAGNOSIS — Z98.84 S/P GASTRIC BYPASS: ICD-10-CM

## 2018-09-11 DIAGNOSIS — D51.9 ANEMIA DUE TO VITAMIN B12 DEFICIENCY, UNSPECIFIED B12 DEFICIENCY TYPE: ICD-10-CM

## 2018-09-11 NOTE — TELEPHONE ENCOUNTER
"Requested Prescriptions   Pending Prescriptions Disp Refills     cyanocobalamin (VITAMIN B12) 1000 MCG/ML injection [Pharmacy Med Name: CYANOCOBALAMIN 1000MCG/ML SOLN] 1 mL 11    Last Written Prescription Date:  8/30/17  Last Fill Quantity: 1 mL,  # refills: 11   Last office visit: 8/30/2017 with prescribing provider:  8/30/17   Future Office Visit:     Sig: INJECT 1 ML AS DIRECTED ONCE EVERY 30 DAYS    Vitamin Supplements (Adult) Protocol Failed    9/11/2018  1:45 PM       Failed - Recent (12 mo) or future (30 days) visit within the authorizing provider's specialty    Patient had office visit in the last 12 months or has a visit in the next 30 days with authorizing provider or within the authorizing provider's specialty.  See \"Patient Info\" tab in inbasket, or \"Choose Columns\" in Meds & Orders section of the refill encounter.           Passed - High dose Vitamin D not ordered          "

## 2018-09-12 NOTE — TELEPHONE ENCOUNTER
Routing refill request to provider for review/approval because:  Labs not current:  Hgb  Patient needs to be seen because it has been more than 1 year since last office visit.  Yris Monson RN, BSN    Hemoglobin   Date Value Ref Range Status   09/01/2017 14.0 11.7 - 15.7 g/dL Final

## 2018-09-13 RX ORDER — CYANOCOBALAMIN 1000 UG/ML
INJECTION, SOLUTION INTRAMUSCULAR; SUBCUTANEOUS
Qty: 1 ML | Refills: 0 | Status: SHIPPED | OUTPATIENT
Start: 2018-09-13 | End: 2018-10-03

## 2018-10-03 ENCOUNTER — OFFICE VISIT (OUTPATIENT)
Dept: FAMILY MEDICINE | Facility: OTHER | Age: 58
End: 2018-10-03
Payer: COMMERCIAL

## 2018-10-03 VITALS
RESPIRATION RATE: 20 BRPM | DIASTOLIC BLOOD PRESSURE: 72 MMHG | HEART RATE: 103 BPM | SYSTOLIC BLOOD PRESSURE: 116 MMHG | TEMPERATURE: 96.8 F | HEIGHT: 68 IN | OXYGEN SATURATION: 98 % | WEIGHT: 241 LBS | BODY MASS INDEX: 36.53 KG/M2

## 2018-10-03 DIAGNOSIS — L98.9 SKIN LESION: ICD-10-CM

## 2018-10-03 DIAGNOSIS — D51.9 ANEMIA DUE TO VITAMIN B12 DEFICIENCY, UNSPECIFIED B12 DEFICIENCY TYPE: ICD-10-CM

## 2018-10-03 DIAGNOSIS — I10 HTN, GOAL BELOW 140/90: ICD-10-CM

## 2018-10-03 DIAGNOSIS — Z83.49 FAMILY HISTORY OF THYROID PROBLEM: ICD-10-CM

## 2018-10-03 DIAGNOSIS — Z76.89 ESTABLISHING CARE WITH NEW DOCTOR, ENCOUNTER FOR: Primary | ICD-10-CM

## 2018-10-03 DIAGNOSIS — Z11.59 NEED FOR HEPATITIS C SCREENING TEST: ICD-10-CM

## 2018-10-03 DIAGNOSIS — Z98.84 S/P GASTRIC BYPASS: ICD-10-CM

## 2018-10-03 LAB
ANION GAP SERPL CALCULATED.3IONS-SCNC: 10 MMOL/L (ref 3–14)
BUN SERPL-MCNC: 10 MG/DL (ref 7–30)
CALCIUM SERPL-MCNC: 9.3 MG/DL (ref 8.5–10.1)
CHLORIDE SERPL-SCNC: 103 MMOL/L (ref 94–109)
CO2 SERPL-SCNC: 25 MMOL/L (ref 20–32)
CREAT SERPL-MCNC: 0.81 MG/DL (ref 0.52–1.04)
CREAT UR-MCNC: 60 MG/DL
GFR SERPL CREATININE-BSD FRML MDRD: 73 ML/MIN/1.7M2
GLUCOSE SERPL-MCNC: 97 MG/DL (ref 70–99)
MICROALBUMIN UR-MCNC: <5 MG/L
MICROALBUMIN/CREAT UR: NORMAL MG/G CR (ref 0–25)
POTASSIUM SERPL-SCNC: 4.3 MMOL/L (ref 3.4–5.3)
SODIUM SERPL-SCNC: 138 MMOL/L (ref 133–144)
TSH SERPL DL<=0.005 MIU/L-ACNC: 0.75 MU/L (ref 0.4–4)

## 2018-10-03 PROCEDURE — 82043 UR ALBUMIN QUANTITATIVE: CPT | Performed by: NURSE PRACTITIONER

## 2018-10-03 PROCEDURE — 86803 HEPATITIS C AB TEST: CPT | Performed by: NURSE PRACTITIONER

## 2018-10-03 PROCEDURE — 80048 BASIC METABOLIC PNL TOTAL CA: CPT | Performed by: NURSE PRACTITIONER

## 2018-10-03 PROCEDURE — 36415 COLL VENOUS BLD VENIPUNCTURE: CPT | Performed by: NURSE PRACTITIONER

## 2018-10-03 PROCEDURE — 84443 ASSAY THYROID STIM HORMONE: CPT | Performed by: NURSE PRACTITIONER

## 2018-10-03 PROCEDURE — 99214 OFFICE O/P EST MOD 30 MIN: CPT | Performed by: NURSE PRACTITIONER

## 2018-10-03 RX ORDER — LISINOPRIL 10 MG/1
10 TABLET ORAL DAILY
Qty: 90 TABLET | Refills: 3 | Status: SHIPPED | OUTPATIENT
Start: 2018-10-03 | End: 2019-10-21

## 2018-10-03 RX ORDER — CYANOCOBALAMIN 1000 UG/ML
INJECTION, SOLUTION INTRAMUSCULAR; SUBCUTANEOUS
Qty: 1 ML | Refills: 11 | Status: SHIPPED | OUTPATIENT
Start: 2018-10-03 | End: 2019-10-14

## 2018-10-03 RX ORDER — NAPROXEN 500 MG/1
250 TABLET ORAL DAILY
COMMUNITY
Start: 2018-10-03 | End: 2021-06-07

## 2018-10-03 ASSESSMENT — PAIN SCALES - GENERAL: PAINLEVEL: NO PAIN (0)

## 2018-10-03 NOTE — LETTER
October 4, 2018      Kristine Cox     [facility] [place] [address]        Dear ,    We are writing to inform you of your test results.    Your test results fall within the expected range(s) or remain unchanged from previous results.  Please continue with current treatment plan.    Resulted Orders   Basic metabolic panel  (Ca, Cl, CO2, Creat, Gluc, K, Na, BUN)   Result Value Ref Range    Sodium 138 133 - 144 mmol/L    Potassium 4.3 3.4 - 5.3 mmol/L    Chloride 103 94 - 109 mmol/L    Carbon Dioxide 25 20 - 32 mmol/L    Anion Gap 10 3 - 14 mmol/L    Glucose 97 70 - 99 mg/dL    Urea Nitrogen 10 7 - 30 mg/dL    Creatinine 0.81 0.52 - 1.04 mg/dL    GFR Estimate 73 >60 mL/min/1.7m2      Comment:      Non  GFR Calc    GFR Estimate If Black 88 >60 mL/min/1.7m2      Comment:       GFR Calc    Calcium 9.3 8.5 - 10.1 mg/dL   Albumin Random Urine Quantitative with Creat Ratio   Result Value Ref Range    Creatinine Urine 60 mg/dL    Albumin Urine mg/L <5 mg/L    Albumin Urine mg/g Cr Unable to calculate due to low value 0 - 25 mg/g Cr   TSH with free T4 reflex   Result Value Ref Range    TSH 0.75 0.40 - 4.00 mU/L   Hepatitis C Screen Reflex to HCV RNA Quant and Genotype   Result Value Ref Range    Hepatitis C Antibody Nonreactive NR^Nonreactive      Comment:      Assay performance characteristics have not been established for newborns,   infants, and children         If you have any questions or concerns, please call the clinic at the number listed above.       Sincerely,        LUZ Mock CNP

## 2018-10-03 NOTE — PROGRESS NOTES
SUBJECTIVE:   Kristine Cox is a 58 year old female who presents to clinic today for the following health issues:      New Patient/Transfer of Care  Patient has previously been under the care of CARLEY Bull who has left the clinic.  Patient requests transfer care to me. Patient medications reconciled, PMH and Problem list reviewed and HM updated.      Hypertension Follow-up      Outpatient blood pressures are being checked at home.    Low Salt Diet: low salt      Amount of exercise or physical activity: None    Problems taking medications regularly: No    Medication side effects: none    Diet: regular (no restrictions)      Has a skin lesion on her upper lip that is not healing. Has been there for several months.  Would like to see a dermatologist for this.       Problem list and histories reviewed & adjusted, as indicated.  Additional history: as documented    Patient Active Problem List   Diagnosis     CARDIOVASCULAR SCREENING; LDL GOAL LESS THAN 160     HTN, goal below 140/90     Vaginal atrophy     S/P gastric bypass     History of cervical spinal surgery     Lesion of sciatic nerve, right     Lumbosacral radiculopathy     Cervicalgia     B12 deficiency anemia     Lumbar foraminal stenosis     Protruded cervical disc     Piriformis syndrome, right     Low serum sodium     Tobacco abuse     Severe obesity (BMI 35.0-39.9)     Past Surgical History:   Procedure Laterality Date     C EXCIS KNEE CARTILAGE,MEDIAL OR LAT  07/02/07    right knee     CHOLECYSTECTOMY       COLONOSCOPY  9/20/2011    Procedure:COMBINED COLONOSCOPY, REMOVE TUMOR/POLYP/LESION BY SNARE; Colonoscopy with polypectomy by snare; Surgeon:TERESO LIANG; Location: GI     COLONOSCOPY N/A 9/18/2017    Procedure: COMBINED COLONOSCOPY, SINGLE OR MULTIPLE BIOPSY/POLYPECTOMY BY BIOPSY;;  Surgeon: Cale James MD;  Location:  GI     DAVINCI BYPASS GASTRIC JOSHUA-EN-Y  1993     DISCECTOMY CERVICAL MINIMALLY INVASIVE TWO LEVELS        FUSION CERVICAL ANTERIOR TWO LEVELS      C5-7     HC LIGMT REVISION,KNEE,INTRA-ARTIC  07/02/07     HYSTERECTOMY VAGINAL       HYSTERECTOMY, PAP NO LONGER INDICATED       TONSILLECTOMY         Social History   Substance Use Topics     Smoking status: Current Every Day Smoker     Packs/day: 0.50     Years: 40.00     Types: Cigarettes     Smokeless tobacco: Never Used     Alcohol use 3.0 oz/week     5 Glasses of wine per week      Comment: 5 per week     Family History   Problem Relation Age of Onset     HEART DISEASE Father      heart murmur and CHF     Hypertension Brother      HEART DISEASE Brother      stents     Thyroid Disease Mother      hypothyroid     Diabetes Mother      Hypertension Sister          Current Outpatient Prescriptions   Medication Sig Dispense Refill     aspirin 81 MG tablet Take 1 tablet by mouth daily.       calcium-vitamin D (CALTRATE) 600-400 MG-UNIT per tablet Take 2 tablets by mouth daily.       cholecalciferol (VITAMIN D3) 1000 UNIT capsule Take 2 capsules by mouth daily.       cyanocobalamin (VITAMIN B12) 1000 MCG/ML injection INJECT 1 ML AS DIRECTED ONCE EVERY 30 DAYS 1 mL 0     docusate sodium (COLACE) 100 MG capsule Take 1 capsule (100 mg) by mouth daily 30 capsule 1     lisinopril (PRINIVIL/ZESTRIL) 10 MG tablet Take 1 tablet (10 mg) by mouth daily Appointment needed for additional refills. 30 tablet 0     naproxen (NAPROSYN) 500 MG tablet Take 1 tablet (500 mg) by mouth daily       Allergies   Allergen Reactions     Contrast Dye Hives and Itching     No Known Drug Allergy      BP Readings from Last 3 Encounters:   10/03/18 116/72   09/18/17 133/86   08/30/17 122/82    Wt Readings from Last 3 Encounters:   10/03/18 241 lb (109.3 kg)   08/30/17 235 lb (106.6 kg)   10/10/16 227 lb (103 kg)                    Reviewed and updated as needed this visit by clinical staff  Tobacco  Allergies  Meds  Soc Hx      Reviewed and updated as needed this visit by Provider      "    ROS:  Constitutional, HEENT, cardiovascular, pulmonary, GI, , musculoskeletal, neuro, skin, endocrine and psych systems are negative, except as otherwise noted.    OBJECTIVE:     /72  Pulse 103  Temp 96.8  F (36  C) (Tympanic)  Resp 20  Ht 5' 7.5\" (1.715 m)  Wt 241 lb (109.3 kg)  LMP  (LMP Unknown)  SpO2 98%  Breastfeeding? No  BMI 37.19 kg/m2  Body mass index is 37.19 kg/(m^2).  GENERAL: alert, no distress and obese  NECK: no adenopathy, no asymmetry, masses, or scars and thyroid normal to palpation  SKIN: raised red papule on right upper lip with some scaling.   RESP: lungs clear to auscultation - no rales, rhonchi or wheezes  CV: regular rate and rhythm, normal S1 S2, no S3 or S4, no murmur, click or rub, no peripheral edema and peripheral pulses strong  MS: no gross musculoskeletal defects noted, no edema    Diagnostic Test Results:  none     ASSESSMENT/PLAN:         1. Establishing care with new doctor, encounter for    2. Anemia due to vitamin B12 deficiency, unspecified B12 deficiency type  Chronic, controlled.  No change in treatment plan.   - cyanocobalamin (VITAMIN B12) 1000 MCG/ML injection; INJECT 1 ML AS DIRECTED ONCE EVERY 30 DAYS  Dispense: 1 mL; Refill: 11    3. S/P gastric bypass  - cyanocobalamin (VITAMIN B12) 1000 MCG/ML injection; INJECT 1 ML AS DIRECTED ONCE EVERY 30 DAYS  Dispense: 1 mL; Refill: 11    4. HTN, goal below 140/90  Chronic, controlled.  No change in treatment plan.   - Basic metabolic panel  (Ca, Cl, CO2, Creat, Gluc, K, Na, BUN)  - Albumin Random Urine Quantitative with Creat Ratio    5. Skin lesion  - DERMATOLOGY REFERRAL    6. Family history of thyroid problem  - TSH with free T4 reflex    7. Essential hypertension with goal blood pressure less than 140/90  Chronic, controlled.  No change in treatment plan.   - lisinopril (PRINIVIL/ZESTRIL) 10 MG tablet; Take 1 tablet (10 mg) by mouth daily Fill when requested  Dispense: 90 tablet; Refill: 3    8. Need for " hepatitis C screening test  - Hepatitis C Screen Reflex to HCV RNA Quant and Genotype    See Patient Instructions    LUZ Mock [facility]

## 2018-10-03 NOTE — MR AVS SNAPSHOT
After Visit Summary   10/3/2018    Kristine Cox    MRN: [id number]           Patient Information     Date Of Birth          1960        Visit Information        Provider Department      10/3/2018 1:00 PM Carlie Corrales APRN [facility]        Today's Diagnoses     Establishing care with new doctor, encounter for    -  1    Anemia due to vitamin B12 deficiency, unspecified B12 deficiency type        S/P gastric bypass        HTN, goal below 140/90        Skin lesion        Family history of thyroid problem        Essential hypertension with goal blood pressure less than 140/90        Need for hepatitis C screening test          Care Instructions    Labs will be done today.   For normal results, you will receive a letter with the results in about 2 weeks.  If anything is abnormal or unexpected, someone from the clinic will call you.      I send over a refill of your Lisinopril for when you need it.                   Follow-ups after your visit        Additional Services     DERMATOLOGY REFERRAL       Your provider has referred you to: [facility]: [facility] [phone]   [email or web address]    Please be aware that coverage of these services is subject to the terms and limitations of your health insurance plan.  Call member services at your health plan with any benefit or coverage questions.      Please bring the following with you to your appointment:    (1) Any X-Rays, CTs or MRIs which have been performed.  Contact the facility where they were done to arrange for  prior to your scheduled appointment.    (2) List of current medications  (3) This referral request   (4) Any documents/labs given to you for this referral                  Follow-up notes from your care team     Return in about 1 year (around 10/3/2019) for Routine Visit.      Who to contact     If you have questions or need follow  "up information about today's clinic visit or your schedule please contact [facility] directly at [phone].  Normal or non-critical lab and imaging results will be communicated to you by MyChart, letter or phone within 4 business days after the clinic has received the results. If you do not hear from us within 7 days, please contact the clinic through MyChart or phone. If you have a critical or abnormal lab result, we will notify you by phone as soon as possible.  Submit refill requests through [employer] or call your pharmacy and they will forward the refill request to us. Please allow 3 business days for your refill to be completed.          Additional Information About Your Visit        Care EveryWhere ID     This is your Care EveryWhere ID. This could be used by other organizations to access your [facility] medical records  [id number]        Your Vitals Were     Pulse Temperature Respirations Height Last Period Pulse Oximetry    103 96.8  F (36  C) (Tympanic) 20 5' 7.5\" (1.715 m) (LMP Unknown) 98%    Breastfeeding? BMI (Body Mass Index)                No 37.19 kg/m2           Blood Pressure from Last 3 Encounters:   10/03/18 116/72   09/18/17 133/86   08/30/17 122/82    Weight from Last 3 Encounters:   10/03/18 241 lb (109.3 kg)   08/30/17 235 lb (106.6 kg)   10/10/16 227 lb (103 kg)              We Performed the Following     Albumin Random Urine Quantitative with Creat Ratio     Basic metabolic panel  (Ca, Cl, CO2, Creat, Gluc, K, Na, BUN)     DERMATOLOGY REFERRAL     Hepatitis C Screen Reflex to HCV RNA Quant and Genotype     TSH with free T4 reflex          Today's Medication Changes          These changes are accurate as of 10/3/18  1:31 PM.  If you have any questions, ask your nurse or doctor.               These medicines have changed or have updated prescriptions.        Dose/Directions    lisinopril 10 MG tablet   Commonly known as:  PRINIVIL/ZESTRIL   This may have changed:  additional " instructions   Used for:  Essential hypertension with goal blood pressure less than 140/90   Changed by:  Carlie Corrales APRN CNP        Dose:  10 mg   Take 1 tablet (10 mg) by mouth daily Fill when requested   Quantity:  90 tablet   Refills:  3            Where to get your medicines      These medications were sent to [facility] [place], [place] - [address]  [address] , [facility] [address]     Phone:  [phone]     cyanocobalamin 1000 MCG/ML injection    lisinopril 10 MG tablet                Primary Care Provider Office Phone # Fax #    LUZ Mock [phone]       [address]  [facility] [address]        Equal Access to Services     SHILPA LANGFORD : Hadii aad ku hadasho Soomaali, waaxda luqadaha, qaybta kaalmada adeegyada, [place] alonso . So [facility] [phone].    ATENCIÓN: Si habla español, tiene a mills disposición servicios gratuitos de asistencia lingüística. Llame al [phone].    We comply with applicable federal civil rights laws and [place] laws. We do not discriminate on the basis of race, color, national origin, age, disability, sex, sexual orientation, or gender identity.            Thank you!     Thank you for choosing [facility]  for your care. Our goal is always to provide you with excellent care. Hearing back from our patients is one way we can continue to improve our services. Please take a few minutes to complete the written survey that you may receive in the mail after your visit with us. Thank you!             Your Updated Medication List - Protect others around you: Learn how to safely use, store and throw away your medicines at www.disposemymeds.org.          This list is accurate as of 10/3/18  1:31 PM.  Always use your most recent med list.                   Brand Name Dispense Instructions for use Diagnosis    aspirin 81 MG tablet      Take 1 tablet by mouth daily.        calcium carbonate 600  mg-vitamin D 400 units 600-400 MG-UNIT per tablet    CALTRATE     Take 2 tablets by mouth daily.        cholecalciferol 1000 units capsule    vitamin  -D     Take 2 capsules by mouth daily.        cyanocobalamin 1000 MCG/ML injection    VITAMIN B12    1 mL    INJECT 1 ML AS DIRECTED ONCE EVERY 30 DAYS    Anemia due to vitamin B12 deficiency, unspecified B12 deficiency type, S/P gastric bypass       docusate sodium 100 MG capsule    COLACE    30 capsule    Take 1 capsule (100 mg) by mouth daily    Constipation       lisinopril 10 MG tablet    PRINIVIL/ZESTRIL    90 tablet    Take 1 tablet (10 mg) by mouth daily Fill when requested    Essential hypertension with goal blood pressure less than 140/90       NAPROSYN 500 MG tablet   Generic drug:  naproxen      Take 1 tablet (500 mg) by mouth daily

## 2018-10-04 LAB — HCV AB SERPL QL IA: NONREACTIVE

## 2018-11-27 ENCOUNTER — OFFICE VISIT (OUTPATIENT)
Dept: DERMATOLOGY | Facility: CLINIC | Age: 58
End: 2018-11-27
Attending: NURSE PRACTITIONER
Payer: COMMERCIAL

## 2018-11-27 DIAGNOSIS — L82.1 SEBORRHEIC KERATOSIS: ICD-10-CM

## 2018-11-27 DIAGNOSIS — L57.0 AK (ACTINIC KERATOSIS): ICD-10-CM

## 2018-11-27 DIAGNOSIS — D48.9 NEOPLASM OF UNCERTAIN BEHAVIOR: Primary | ICD-10-CM

## 2018-11-27 DIAGNOSIS — L73.8 SENILE SEBACEOUS GLAND HYPERPLASIA: ICD-10-CM

## 2018-11-27 DIAGNOSIS — L81.4 SOLAR LENTIGO: ICD-10-CM

## 2018-11-27 DIAGNOSIS — D23.9 DERMATOFIBROMA: ICD-10-CM

## 2018-11-27 DIAGNOSIS — D22.9 MULTIPLE BENIGN NEVI: ICD-10-CM

## 2018-11-27 DIAGNOSIS — L57.8 SUN-DAMAGED SKIN: ICD-10-CM

## 2018-11-27 PROCEDURE — 17003 DESTRUCT PREMALG LES 2-14: CPT | Performed by: DERMATOLOGY

## 2018-11-27 PROCEDURE — 11100 HC BIOPSY SKIN/SUBQ/MUC MEM, SINGLE LESION: CPT | Mod: 59 | Performed by: DERMATOLOGY

## 2018-11-27 PROCEDURE — 17000 DESTRUCT PREMALG LESION: CPT | Performed by: DERMATOLOGY

## 2018-11-27 PROCEDURE — 88342 IMHCHEM/IMCYTCHM 1ST ANTB: CPT | Mod: TC | Performed by: DERMATOLOGY

## 2018-11-27 PROCEDURE — 88305 TISSUE EXAM BY PATHOLOGIST: CPT | Mod: TC | Performed by: DERMATOLOGY

## 2018-11-27 PROCEDURE — 99203 OFFICE O/P NEW LOW 30 MIN: CPT | Mod: 25 | Performed by: DERMATOLOGY

## 2018-11-27 ASSESSMENT — PAIN SCALES - GENERAL: PAINLEVEL: NO PAIN (0)

## 2018-11-27 NOTE — PROGRESS NOTES
[facility] Dermatology Note      Dermatology Problem List:  1.NUB, right upper back DDx: DN vs flat SK  -s/p biopsy 11/27/2018    Encounter Date: Nov 27, 2018    CC:  Chief Complaint   Patient presents with     Skin Check     Family history of unknown skin cancer         History of Present Illness:  Ms. Kristine Cox is a 58 year old female who presents as a referral from Dr. Corrales for a skin lesion of concern on her upper lip. It has been there about a year. It flakes off at times, but never goes away entirely. She also wants a mole on her upper thigh look at. It is brown, atypical, and growing. Family history of unknown skin cancer. No other concerns addressed today.    Past Medical History:   Patient Active Problem List   Diagnosis     CARDIOVASCULAR SCREENING; LDL GOAL LESS THAN 160     HTN, goal below 140/90     Vaginal atrophy     S/P gastric bypass     History of cervical spinal surgery     Lesion of sciatic nerve, right     Lumbosacral radiculopathy     Cervicalgia     B12 deficiency anemia     Lumbar foraminal stenosis     Protruded cervical disc     Piriformis syndrome, right     Low serum sodium     Tobacco abuse     Severe obesity (BMI 35.0-39.9)     Past Medical History:   Diagnosis Date     Hypertension      Iron deficiency anemia 2016     Lumbar foraminal stenosis 8/2015    noted on MRI     Piriformis syndrome, right 10/10/2016    Dx by neuro     Protruded cervical disc 8/2015    C4-5 noted on MRI     Past Surgical History:   Procedure Laterality Date     C EXCIS KNEE CARTILAGE,MEDIAL OR LAT  07/02/07    right knee     CHOLECYSTECTOMY       COLONOSCOPY  9/20/2011    Procedure:COMBINED COLONOSCOPY, REMOVE TUMOR/POLYP/LESION BY SNARE; Colonoscopy with polypectomy by snare; Surgeon:TERESO LIANG; Location: GI     COLONOSCOPY N/A 9/18/2017    Procedure: COMBINED COLONOSCOPY, SINGLE OR MULTIPLE BIOPSY/POLYPECTOMY BY BIOPSY;;  Surgeon: Cale James MD;  Location:   GI     DAVINCI BYPASS GASTRIC JOSHUA-EN-Y  1993     DISCECTOMY CERVICAL MINIMALLY INVASIVE TWO LEVELS       FUSION CERVICAL ANTERIOR TWO LEVELS      C5-7     HC LIGMT REVISION,KNEE,INTRA-ARTIC  07/02/07     HYSTERECTOMY VAGINAL       HYSTERECTOMY, PAP NO LONGER INDICATED       TONSILLECTOMY         Social History:  Patient reports that she has been smoking Cigarettes.  She has a 20.00 pack-year smoking history. She has never used smokeless tobacco. She reports that she drinks about 3.0 oz of alcohol per week  She reports that she does not use illicit drugs. Patient is retired para at an elementary school.     Family History:  Family History   Problem Relation Age of Onset     HEART DISEASE Father      heart murmur and CHF     Hypertension Brother      HEART DISEASE Brother      stents     Thyroid Disease Mother      hypothyroid     Diabetes Mother      Hypertension Sister        Medications:  Current Outpatient Prescriptions   Medication Sig Dispense Refill     aspirin 81 MG tablet Take 1 tablet by mouth daily.       calcium-vitamin D (CALTRATE) 600-400 MG-UNIT per tablet Take 2 tablets by mouth daily.       cholecalciferol (VITAMIN D3) 1000 UNIT capsule Take 2 capsules by mouth daily.       cyanocobalamin (VITAMIN B12) 1000 MCG/ML injection INJECT 1 ML AS DIRECTED ONCE EVERY 30 DAYS 1 mL 11     docusate sodium (COLACE) 100 MG capsule Take 1 capsule (100 mg) by mouth daily 30 capsule 1     lisinopril (PRINIVIL/ZESTRIL) 10 MG tablet Take 1 tablet (10 mg) by mouth daily Fill when requested 90 tablet 3     naproxen (NAPROSYN) 500 MG tablet Take 1 tablet (500 mg) by mouth daily         Allergies   Allergen Reactions     Contrast Dye Hives and Itching     No Known Drug Allergy        Review of Systems:  -Constitutional: Patient is otherwise feeling well, in usual state of health.   -Skin: As above in HPI. No additional skin concerns.    Physical exam:  Vitals: LMP  (LMP Unknown)  GEN: This is a well developed,  well-nourished female in no acute distress, in a pleasant mood.    SKIN: Total skin excluding the undergarment areas was performed. The exam included the head/face, neck, both arms, chest, back, abdomen, both legs, digits and/or nails.  - Limon Type II  - Scattered brown macules on sun exposed areas.   - There are erythematous macules with overyling adherent scale on the upper lip, left cheek x 2.   - There are yellow oily papules with central umbilication located on the face.  - Right upper back, 6 mm brown macule with grey area in center and granular brown pigment on the periphery DN vs flat SK vs melanoma  - There is a firm tan/flesh colored papule that dimples with lateral pressure on the left upper arm.  - Multiple regular brown pigmented macules and papules are identified on the trunk.   - There are waxy stuck on tan to brown papules on the trunk and extremities (area of pt concern).  - No other lesions of concern on areas examined.       Impression/Plan:  1. Sun damaged skin with solar lentigines    Recommend sunscreens SPF #30 or greater, protective clothing and avoidance of tanning beds.    2. Benign findings including: sebaceous hyperplasia, seborrheic keratosis, dermatofibroma    No further intervention required. Patient to report changes.     Patient reassured of the benign nature of these lesions.    3. Multiple clinically benign nevi    No further intervention required. Patient to report changes.     Patient reassured of the benign nature of these lesions.    4. Actinic keratosis. Discussed precancerous nature of these lesions. Recommended treatment to prevent progression to a squamous cell carcinoma.   Cryotherapy procedure note: After verbal consent and discussion of risks and benefits including but no limited to dyspigmentation/scar, blister, and pain, 3 was(were) treated with 1-2mm freeze border for 2 cycles with liquid nitrogen. Post cryotherapy instructions were provided.     5. 6 mm brown  macule with grey area in center and granular brown pigment on the periphery, right upper back. NUB, Ddx: DN vs flat SK vs melanoma    Shave biopsy:  After discussion of benefits and risks including but not limited to bleeding/bruising, pain/swelling, infection, scar, incomplete removal, nerve damage/numbness, recurrence, and non-diagnostic biopsy, written consent, verbal consent and photographs were obtained. Time-out was performed. The area was cleaned with isopropyl alcohol. 0.5ml of 1% lidocaine with 1:100,000 epinephrine was injected to obtain adequate anesthesia. A shave biopsy was performed. Hemostasis was achieved with aluminium chloride. Vaseline and a sterile dressing were applied. The patient tolerated the procedure and no complications were noted. The patient was provided with verbal and written post care instructions.      CC LUZ Mock CNP on close of this encounter.  Follow-up pending biopsy results. 1 year for skin exam, sooner for lesions of concern.         Staff Involved:  Scribe/Staff    Scribe Disclosure  I, Li De Los Santos, am serving as a scribe to document services personally performed by Dr. Babita Richardson MD, based on data collection and the provider's statements to me.     Provider Disclosure:   The documentation recorded by the scribe accurately reflects the services I personally performed and the decisions made by me.    Babita Richardson MD    Department of Dermatology  [facility]: Phone: [phone], Fax:[phone]  [facility]: Phone: [phone], Fax: [phone]

## 2018-11-27 NOTE — PATIENT INSTRUCTIONS
Cryotherapy    What is it?    Use of a very cold liquid, such as liquid nitrogen, to freeze and destroy abnormal skin cells that need to be removed    What should I expect?    Tenderness and redness    A small blister that might grow and fill with dark purple blood. There may be crusting.    More than one treatment may be needed if the lesions do not go away.    How do I care for the treated area?    Gently wash the area with your hands when bathing.    Use a thin layer of Vaseline to help with healing. You may use a Band-Aid.     The area should heal within 7-10 days and may leave behind a pink or lighter color.     Do not use an antibiotic or Neosporin ointment.     You may take acetaminophen (Tylenol) for pain.     Call your Doctor if you have:    Severe pain    Signs of infection (warmth, redness, cloudy yellow drainage, and or a bad smell)    Questions or concerns    Who should I call with questions?       [facility]: [phone]       [facility]: [phone]       For urgent needs outside of business hours call the [facility] at [phone]        and ask for the dermatology resident on call    Wound Care After a Biopsy    What is a skin biopsy?  A skin biopsy allows the doctor to examine a very small piece of tissue under the microscope to determine the diagnosis and the best treatment for the skin condition. A local anesthetic (numbing medicine)  is injected with a very small needle into the skin area to be tested. A small piece of skin is taken from the area. Sometimes a suture (stitch) is used.     What are the risks of a skin biopsy?  I will experience scar, bleeding, swelling, pain, crusting and redness. I may experience incomplete removal or recurrence. Risks of this procedure are excessive bleeding, bruising, infection, nerve damage, numbness, thick (hypertrophic or keloidal) scar and non-diagnostic biopsy.    How should I care  for my wound for the first 24 hours?    Keep the wound dry and covered for 24 hours    If it bleeds, hold direct pressure on the area for 15 minutes. If bleeding does not stop then go to the emergency room    Avoid strenuous exercise the first 1-2 days or as your doctor instructs you    How should I care for the wound after 24 hours?    After 24 hours, remove the bandage    You may bathe or shower as normal    If you had a scalp biopsy, you can shampoo as usual and can use shower water to clean the biopsy site daily    Clean the wound twice a day with gentle soap and water    Do not scrub, be gentle    Apply white petroleum/Vaseline after cleaning the wound with a cotton swab or a clean finger, and keep the site covered with a Bandaid /bandage. Bandages are not necessary with a scalp biopsy    If you are unable to cover the site with a Bandaid /bandage, re-apply ointment 2-3 times a day to keep the site moist. Moisture will help with healing    Avoid strenuous activity for first 1-2 days    Avoid lakes, rivers, pools, and oceans until the stitches are removed or the site is healed    How do I clean my wound?    Wash hands thoroughly with soap or use hand  before all wound care    Clean the wound with gentle soap and water    Apply white petroleum/Vaseline  to wound after it is clean    Replace the Bandaid /bandage to keep the wound covered for the first few days or as instructed by your doctor    If you had a scalp biopsy, warm shower water to the area on a daily basis should suffice    What should I use to clean my wound?     Cotton-tipped applicators (Qtips )    White petroleum jelly (Vaseline ). Use a clean new container and use Q-tips to apply.    Bandaids   as needed    Gentle soap     How should I care for my wound long term?    Do not get your wound dirty    Keep up with wound care for one week or until the area is healed.    A small scab will form and fall off by itself when the area is completely  healed. The area will be red and will become pink in color as it heals. Sun protection is very important for how your scar will turn out. Sunscreen with an SPF 30 or greater is recommended once the area is healed.    You should have some soreness but it should be mild and slowly go away over several days. Talk to your doctor about using tylenol for pain,    When should I call my doctor?  If you have increased:     Pain or swelling    Pus or drainage (clear or slightly yellow drainage is ok)    Temperature over 100F    Spreading redness or warmth around wound    When will I hear about my results?  The biopsy results can take 2-3 weeks to come back. The clinic will call you with the results, send you a [employer] message, or have you schedule a follow-up clinic or phone time to discuss the results. Contact our clinics if you do not hear from us in 3 weeks.     Who should I call with questions?    [facility]: [phone]     [facility]: [phone]    For urgent needs outside of business hours call the [facility] at [phone] and ask for the dermatology resident on call

## 2018-11-27 NOTE — NURSING NOTE
@Kristine Cox's goals for this visit include:   Chief Complaint   Patient presents with     Skin Check     Family history of unknown skin cancer       She requests these members of her care team be copied on today's visit information: Yes    PCP: Carlie Corrales    Referring Provider:  LUZ Mock CNP  [address] [place] [address]    LMP  (LMP Unknown)    Do you need any medication refills at today's visit? NO    Evelia Chambers CMA

## 2018-11-27 NOTE — NURSING NOTE
The following medication was given:     MEDICATION:  Lidocaine with epinephrine 1% 1:[id number]  ROUTE: SQ  SITE: see procedure note  DOSE: 2 cc  LOT #: -Ev  : Alok  EXPIRATION DATE: 09-19  NDC#: 0871-5292-61   Was there drug waste? No  Multi-dose vial: Yes    Yris Nicolas LPN  November 27, 2018

## 2018-11-27 NOTE — LETTER
11/27/2018         RE: Kristine Cox  [address]  [facility] [address]        Dear Colleague,    Thank you for referring your patient, Kristine Cox, to the [facility]. Please see a copy of my visit note below.    [facility] Dermatology Note      Dermatology Problem List:  1.NUB, right upper back DDx: DN vs flat SK  -s/p biopsy 11/27/2018    Encounter Date: Nov 27, 2018    CC:  Chief Complaint   Patient presents with     Skin Check     Family history of unknown skin cancer         History of Present Illness:  Ms. Kristine Cox is a 58 year old female who presents as a referral from Dr. Corrales for a skin lesion of concern on her upper lip. It has been there about a year. It flakes off at times, but never goes away entirely. She also wants a mole on her upper thigh look at. It is brown, atypical, and growing. Family history of unknown skin cancer. No other concerns addressed today.    Past Medical History:   Patient Active Problem List   Diagnosis     CARDIOVASCULAR SCREENING; LDL GOAL LESS THAN 160     HTN, goal below 140/90     Vaginal atrophy     S/P gastric bypass     History of cervical spinal surgery     Lesion of sciatic nerve, right     Lumbosacral radiculopathy     Cervicalgia     B12 deficiency anemia     Lumbar foraminal stenosis     Protruded cervical disc     Piriformis syndrome, right     Low serum sodium     Tobacco abuse     Severe obesity (BMI 35.0-39.9)     Past Medical History:   Diagnosis Date     Hypertension      Iron deficiency anemia 2016     Lumbar foraminal stenosis 8/2015    noted on MRI     Piriformis syndrome, right 10/10/2016    Dx by neuro     Protruded cervical disc 8/2015    C4-5 noted on MRI     Past Surgical History:   Procedure Laterality Date     C EXCIS KNEE CARTILAGE,MEDIAL OR LAT  07/02/07    right knee     CHOLECYSTECTOMY       COLONOSCOPY  9/20/2011    Procedure:COMBINED COLONOSCOPY, REMOVE TUMOR/POLYP/LESION BY SNARE; Colonoscopy  with polypectomy by snare; Surgeon:TERESO LIANG; Location: GI     COLONOSCOPY N/A 9/18/2017    Procedure: COMBINED COLONOSCOPY, SINGLE OR MULTIPLE BIOPSY/POLYPECTOMY BY BIOPSY;;  Surgeon: Cale James MD;  Location:  GI     DAVINCI BYPASS GASTRIC JOSHUA-EN-Y  1993     DISCECTOMY CERVICAL MINIMALLY INVASIVE TWO LEVELS       FUSION CERVICAL ANTERIOR TWO LEVELS      C5-7     HC LIGMT REVISION,KNEE,INTRA-ARTIC  07/02/07     HYSTERECTOMY VAGINAL       HYSTERECTOMY, PAP NO LONGER INDICATED       TONSILLECTOMY         Social History:  Patient reports that she has been smoking Cigarettes.  She has a 20.00 pack-year smoking history. She has never used smokeless tobacco. She reports that she drinks about 3.0 oz of alcohol per week  She reports that she does not use illicit drugs. Patient is retired para at an elementary school.     Family History:  Family History   Problem Relation Age of Onset     HEART DISEASE Father      heart murmur and CHF     Hypertension Brother      HEART DISEASE Brother      stents     Thyroid Disease Mother      hypothyroid     Diabetes Mother      Hypertension Sister        Medications:  Current Outpatient Prescriptions   Medication Sig Dispense Refill     aspirin 81 MG tablet Take 1 tablet by mouth daily.       calcium-vitamin D (CALTRATE) 600-400 MG-UNIT per tablet Take 2 tablets by mouth daily.       cholecalciferol (VITAMIN D3) 1000 UNIT capsule Take 2 capsules by mouth daily.       cyanocobalamin (VITAMIN B12) 1000 MCG/ML injection INJECT 1 ML AS DIRECTED ONCE EVERY 30 DAYS 1 mL 11     docusate sodium (COLACE) 100 MG capsule Take 1 capsule (100 mg) by mouth daily 30 capsule 1     lisinopril (PRINIVIL/ZESTRIL) 10 MG tablet Take 1 tablet (10 mg) by mouth daily Fill when requested 90 tablet 3     naproxen (NAPROSYN) 500 MG tablet Take 1 tablet (500 mg) by mouth daily         Allergies   Allergen Reactions     Contrast Dye Hives and Itching     No Known Drug Allergy         Review of Systems:  -Constitutional: Patient is otherwise feeling well, in usual state of health.   -Skin: As above in HPI. No additional skin concerns.    Physical exam:  Vitals: LMP  (LMP Unknown)  GEN: This is a well developed, well-nourished female in no acute distress, in a pleasant mood.    SKIN: Total skin excluding the undergarment areas was performed. The exam included the head/face, neck, both arms, chest, back, abdomen, both legs, digits and/or nails.  - Limon Type II  - Scattered brown macules on sun exposed areas.   - There are erythematous macules with overyling adherent scale on the upper lip, left cheek x 2.   - There are yellow oily papules with central umbilication located on the face.  - Right upper back, 6 mm brown macule with grey area in center and granular brown pigment on the periphery DN vs flat SK vs melanoma  - There is a firm tan/flesh colored papule that dimples with lateral pressure on the left upper arm.  - Multiple regular brown pigmented macules and papules are identified on the trunk.   - There are waxy stuck on tan to brown papules on the trunk and extremities (area of pt concern).  - No other lesions of concern on areas examined.       Impression/Plan:  1. Sun damaged skin with solar lentigines    Recommend sunscreens SPF #30 or greater, protective clothing and avoidance of tanning beds.    2. Benign findings including: sebaceous hyperplasia, seborrheic keratosis, dermatofibroma    No further intervention required. Patient to report changes.     Patient reassured of the benign nature of these lesions.    3. Multiple clinically benign nevi    No further intervention required. Patient to report changes.     Patient reassured of the benign nature of these lesions.    4. Actinic keratosis. Discussed precancerous nature of these lesions. Recommended treatment to prevent progression to a squamous cell carcinoma.   Cryotherapy procedure note: After verbal consent and  discussion of risks and benefits including but no limited to dyspigmentation/scar, blister, and pain, 3 was(were) treated with 1-2mm freeze border for 2 cycles with liquid nitrogen. Post cryotherapy instructions were provided.     5. 6 mm brown macule with grey area in center and granular brown pigment on the periphery, right upper back. NUB, Ddx: DN vs flat SK vs melanoma    Shave biopsy:  After discussion of benefits and risks including but not limited to bleeding/bruising, pain/swelling, infection, scar, incomplete removal, nerve damage/numbness, recurrence, and non-diagnostic biopsy, written consent, verbal consent and photographs were obtained. Time-out was performed. The area was cleaned with isopropyl alcohol. 0.5ml of 1% lidocaine with 1:100,000 epinephrine was injected to obtain adequate anesthesia. A shave biopsy was performed. Hemostasis was achieved with aluminium chloride. Vaseline and a sterile dressing were applied. The patient tolerated the procedure and no complications were noted. The patient was provided with verbal and written post care instructions.      CC LUZ Mock CNP on close of this encounter.  Follow-up pending biopsy results. 1 year for skin exam, sooner for lesions of concern.         Staff Involved:  Scribe/Staff    Scribe Disclosure  I, Li De Los Santos, am serving as a scribe to document services personally performed by Dr. Babita Richardson MD, based on data collection and the provider's statements to me.     Provider Disclosure:   The documentation recorded by the scribe accurately reflects the services I personally performed and the decisions made by me.    Babita Richardson MD    Department of Dermatology  [facility]: Phone: [phone], Fax:[phone]  [facility]: Phone: [phone], Fax: [phone]              Again, thank you for allowing  me to participate in the care of your patient.        Sincerely,        Babita Richardson MD

## 2018-11-27 NOTE — MR AVS SNAPSHOT
After Visit Summary   11/27/2018    Kristine Cox    MRN: [id number]           Patient Information     Date Of Birth          1960        Visit Information        Provider Department      11/27/2018 12:30 PM Babita Richardson MD [facility]        Today's Diagnoses     Neoplasm of uncertain behavior    -  1      Care Instructions    Cryotherapy    What is it?    Use of a very cold liquid, such as liquid nitrogen, to freeze and destroy abnormal skin cells that need to be removed    What should I expect?    Tenderness and redness    A small blister that might grow and fill with dark purple blood. There may be crusting.    More than one treatment may be needed if the lesions do not go away.    How do I care for the treated area?    Gently wash the area with your hands when bathing.    Use a thin layer of Vaseline to help with healing. You may use a Band-Aid.     The area should heal within 7-10 days and may leave behind a pink or lighter color.     Do not use an antibiotic or Neosporin ointment.     You may take acetaminophen (Tylenol) for pain.     Call your Doctor if you have:    Severe pain    Signs of infection (warmth, redness, cloudy yellow drainage, and or a bad smell)    Questions or concerns    Who should I call with questions?       [facility]: [phone]       [facility]: [phone]       For urgent needs outside of business hours call the [facility] at [phone]        and ask for the dermatology resident on call    Wound Care After a Biopsy    What is a skin biopsy?  A skin biopsy allows the doctor to examine a very small piece of tissue under the microscope to determine the diagnosis and the best treatment for the skin condition. A local anesthetic (numbing medicine)  is injected with a very small needle into the skin area to be tested. A small piece of skin is taken from the area. Sometimes  a suture (stitch) is used.     What are the risks of a skin biopsy?  I will experience scar, bleeding, swelling, pain, crusting and redness. I may experience incomplete removal or recurrence. Risks of this procedure are excessive bleeding, bruising, infection, nerve damage, numbness, thick (hypertrophic or keloidal) scar and non-diagnostic biopsy.    How should I care for my wound for the first 24 hours?    Keep the wound dry and covered for 24 hours    If it bleeds, hold direct pressure on the area for 15 minutes. If bleeding does not stop then go to the emergency room    Avoid strenuous exercise the first 1-2 days or as your doctor instructs you    How should I care for the wound after 24 hours?    After 24 hours, remove the bandage    You may bathe or shower as normal    If you had a scalp biopsy, you can shampoo as usual and can use shower water to clean the biopsy site daily    Clean the wound twice a day with gentle soap and water    Do not scrub, be gentle    Apply white petroleum/Vaseline after cleaning the wound with a cotton swab or a clean finger, and keep the site covered with a Bandaid /bandage. Bandages are not necessary with a scalp biopsy    If you are unable to cover the site with a Bandaid /bandage, re-apply ointment 2-3 times a day to keep the site moist. Moisture will help with healing    Avoid strenuous activity for first 1-2 days    Avoid lakes, rivers, pools, and oceans until the stitches are removed or the site is healed    How do I clean my wound?    Wash hands thoroughly with soap or use hand  before all wound care    Clean the wound with gentle soap and water    Apply white petroleum/Vaseline  to wound after it is clean    Replace the Bandaid /bandage to keep the wound covered for the first few days or as instructed by your doctor    If you had a scalp biopsy, warm shower water to the area on a daily basis should suffice    What should I use to clean my wound?     Cotton-tipped  applicators (Qtips )    White petroleum jelly (Vaseline ). Use a clean new container and use Q-tips to apply.    Bandaids   as needed    Gentle soap     How should I care for my wound long term?    Do not get your wound dirty    Keep up with wound care for one week or until the area is healed.    A small scab will form and fall off by itself when the area is completely healed. The area will be red and will become pink in color as it heals. Sun protection is very important for how your scar will turn out. Sunscreen with an SPF 30 or greater is recommended once the area is healed.    You should have some soreness but it should be mild and slowly go away over several days. Talk to your doctor about using tylenol for pain,    When should I call my doctor?  If you have increased:     Pain or swelling    Pus or drainage (clear or slightly yellow drainage is ok)    Temperature over 100F    Spreading redness or warmth around wound    When will I hear about my results?  The biopsy results can take 2-3 weeks to come back. The clinic will call you with the results, send you a [employer] message, or have you schedule a follow-up clinic or phone time to discuss the results. Contact our clinics if you do not hear from us in 3 weeks.     Who should I call with questions?    [facility]: [phone]     [facility]: [phone]    For urgent needs outside of business hours call the [facility] at [phone] and ask for the dermatology resident on call              Follow-ups after your visit        Your next 10 appointments already scheduled     Dec 04, 2018 10:30 AM CST   (Arrive by 10:15 AM)   MA SCREENING DIGITAL BILATERAL with MCMA1   [facility] ([facility])    [address] [facility] [address] [phone]           How do I prepare for my exam? (Food and drink instructions) No Food and Drink Restrictions.  How do I prepare  "for my exam? (Other instructions) Do not use any powder, lotion or deodorant under your arms or on your breast. If you do, we will ask you to remove it before your exam.  What should I wear: Wear comfortable, two-piece clothing.  How long does the exam take: Most scans will take 15 minutes.  What should I bring: Bring any previous mammograms from other facilities or have them mailed to the breast center.  Do I need a :  No  is needed.  What do I need to tell my doctor: If you have any allergies, tell your care team.  What should I do after the exam: No restrictions, You may resume normal activities.  What is this test: This test is an x-ray of the breast to look for breast disease. The breast is pressed between two plates to flatten and spread the tissue. An X-ray is taken of the breast from different angles.  Who should I call with questions: If you have any questions, please call the Imaging Department where you will have your exam. Directions, parking instructions, and other information is available on our website, [email or web address].  Other information about my exam Three-dimensional (3D) mammograms are available at [place] locations in [facility], [place], [place], [facility], [place], [facility] and [place]. [facility] locations include [place] and the [facility] and [facility] in [place].  Benefits of 3D mammograms include * Improved rate of cancer detection * Decreases your chance of having to go back for more tests, which means fewer: * \"False-positive\" results (This means that there is an abnormal area but it isn't cancer.) * Invasive testing procedures, such as a biopsy or surgery * Can provide clearer images of the breast if you have dense breast tissue.  *3D mammography is an optional exam that anyone can have with a 2D mammogram. It doesn't replace or take the place of a 2D mammogram. 2D mammograms remain an effective screening test for all women.  Not all " insurance companies cover the cost of a 3D mammogram. Check with your insurance.            Nov 27, 2019 10:30 AM CST   Return Visit with Babita Richardson MD   [facility] ([facility]    [address] [address] [place] [address] [phone]              Who to contact     If you have questions or need follow up information about today's clinic visit or your schedule please contact [facility] directly at [phone].  Normal or non-critical lab and imaging results will be communicated to you by MyChart, letter or phone within 4 business days after the clinic has received the results. If you do not hear from us within 7 days, please contact the clinic through MyChart or phone. If you have a critical or abnormal lab result, we will notify you by phone as soon as possible.  Submit refill requests through [email or web address] or call your pharmacy and they will forward the refill request to us. Please allow 3 business days for your refill to be completed.          Additional Information About Your Visit        Care EveryWhere ID     This is your Care EveryWhere ID. This could be used by other organizations to access your [facility] medical records  [id number]        Your Vitals Were     Last Period                   (LMP Unknown)            Blood Pressure from Last 3 Encounters:   10/03/18 116/72   09/18/17 133/86   08/30/17 122/82    Weight from Last 3 Encounters:   10/03/18 241 lb (109.3 kg)   08/30/17 235 lb (106.6 kg)   10/10/16 227 lb (103 kg)              Today, you had the following     No orders found for display       Primary Care Provider Office Phone # Fax #    LUZ Mock [phone]       [address] [facility] [address]        Equal Access to Services     SHILPA LANGFORD : Renuka Griffin, watenisha reyes, qaybta kaalrachel raphael, kingston levin. So [facility] [phone].    ATENCIÓN: Veronica santa  español, tiene a mills disposición servicios gratuitos de asistencia lingüística. Keke bellamy [phone].    We comply with applicable federal civil rights laws and [place] laws. We do not discriminate on the basis of race, color, national origin, age, disability, sex, sexual orientation, or gender identity.            Thank you!     Thank you for choosing [facility]  for your care. Our goal is always to provide you with excellent care. Hearing back from our patients is one way we can continue to improve our services. Please take a few minutes to complete the written survey that you may receive in the mail after your visit with us. Thank you!             Your Updated Medication List - Protect others around you: Learn how to safely use, store and throw away your medicines at www.disposemymeds.org.          This list is accurate as of 11/27/18  1:01 PM.  Always use your most recent med list.                   Brand Name Dispense Instructions for use Diagnosis    aspirin 81 MG tablet    ASA     Take 1 tablet by mouth daily.        calcium carbonate 600 mg-vitamin D 400 units 600-400 MG-UNIT per tablet    CALTRATE     Take 2 tablets by mouth daily.        cholecalciferol 1000 units capsule    vitamin  -D     Take 2 capsules by mouth daily.        docusate sodium 100 MG capsule    COLACE    30 capsule    Take 1 capsule (100 mg) by mouth daily    Constipation       lisinopril 10 MG tablet    PRINIVIL/ZESTRIL    90 tablet    Take 1 tablet (10 mg) by mouth daily Fill when requested    HTN, goal below 140/90       NAPROSYN 500 MG tablet   Generic drug:  naproxen      Take 1 tablet (500 mg) by mouth daily    Establishing care with new doctor, encounter for       vitamin B-12 1000 MCG/ML injection    CYANOCOBALAMIN    1 mL    INJECT 1 ML AS DIRECTED ONCE EVERY 30 DAYS    Anemia due to vitamin B12 deficiency, unspecified B12 deficiency type, S/P gastric bypass

## 2018-12-04 ENCOUNTER — RADIANT APPOINTMENT (OUTPATIENT)
Dept: MAMMOGRAPHY | Facility: OTHER | Age: 58
End: 2018-12-04
Attending: NURSE PRACTITIONER
Payer: COMMERCIAL

## 2018-12-04 DIAGNOSIS — Z12.31 VISIT FOR SCREENING MAMMOGRAM: ICD-10-CM

## 2018-12-04 PROCEDURE — 77067 SCR MAMMO BI INCL CAD: CPT | Mod: TC

## 2018-12-05 ENCOUNTER — TELEPHONE (OUTPATIENT)
Dept: DERMATOLOGY | Facility: CLINIC | Age: 58
End: 2018-12-05

## 2018-12-05 DIAGNOSIS — D23.9 DYSPLASTIC NEVUS: Primary | ICD-10-CM

## 2018-12-05 LAB — COPATH REPORT: NORMAL

## 2018-12-05 NOTE — TELEPHONE ENCOUNTER
Notes Recorded by Evelia Chambers CMA on 12/5/2018 at 2:58 PM  Called patient and informed her of her results and Dr. Richardson's recommendations.  I scheduled him for an excision on 12/10/18.    Evelia Chambers CMA    ------    Notes Recorded by Babita Richardson MD on 12/5/2018 at 11:40 AM  Please call patient and let her know biopsy showed mole with atypical cells. When atypical cells are present, we grade them as mild, moderate, or severely atypical. Hers were between moderate and severe. Mild and moderate category are variants of normal moles, but severe atypia may be a precursor lesion to melanoma. For this reason, I recommend re-excision of the area with a 5mm margin to make sure all atypical cells are removed. Please place referral to Dr. Greco for excision. Patient should see me back for a skin check in 6 months.     Babita Richardson MD    Department of Dermatology  [facility]: Phone: [phone], Fax:[phone]  [facility]: Phone: [phone], Fax: [phone]    Orders pended.  Sent to Dr. Richardson to sign.    Evelia Chambers CMA

## 2018-12-10 ENCOUNTER — OFFICE VISIT (OUTPATIENT)
Dept: DERMATOLOGY | Facility: CLINIC | Age: 58
End: 2018-12-10
Payer: COMMERCIAL

## 2018-12-10 VITALS — OXYGEN SATURATION: 97 % | DIASTOLIC BLOOD PRESSURE: 82 MMHG | SYSTOLIC BLOOD PRESSURE: 134 MMHG | HEART RATE: 75 BPM

## 2018-12-10 DIAGNOSIS — D23.5 DYSPLASTIC NEVUS OF TRUNK: Primary | ICD-10-CM

## 2018-12-10 PROCEDURE — 88305 TISSUE EXAM BY PATHOLOGIST: CPT | Mod: TC | Performed by: DERMATOLOGY

## 2018-12-10 PROCEDURE — 11402 EXC TR-EXT B9+MARG 1.1-2 CM: CPT | Mod: 51 | Performed by: DERMATOLOGY

## 2018-12-10 PROCEDURE — 12032 INTMD RPR S/A/T/EXT 2.6-7.5: CPT | Performed by: DERMATOLOGY

## 2018-12-10 RX ORDER — LIDOCAINE HYDROCHLORIDE AND EPINEPHRINE 10; 10 MG/ML; UG/ML
1-50 INJECTION, SOLUTION INFILTRATION; PERINEURAL ONCE
Status: COMPLETED | OUTPATIENT
Start: 2018-12-10 | End: 2018-12-10

## 2018-12-10 RX ADMIN — LIDOCAINE HYDROCHLORIDE AND EPINEPHRINE 6 ML: 10; 10 INJECTION, SOLUTION INFILTRATION; PERINEURAL at 16:50

## 2018-12-10 ASSESSMENT — PAIN SCALES - GENERAL: PAINLEVEL: NO PAIN (0)

## 2018-12-10 NOTE — PATIENT INSTRUCTIONS
Excision Wound Care Instructions with Steri strips      I will experience scar, altered skin color, bleeding, swelling, pain, crusting and redness. I may experience altered sensation. Risks are excessive bleeding, infection, muscle weakness, thick (hypertrophic or keloidal) scar, and recurrence. A second procedure may be recommended to obtain the best cosmetic or functional result.    Possible complications of any surgical procedure are bleeding, infection, scarring, alteration in skin color and sensation, muscle weakness in the area, wound dehiscence or seperation, or recurrence of the lesion or disease. On occasion, after healing, a secondary procedure or revision may be recommended in order to obtain the best cosmetic or functional result.       After your surgery, a pressure bandage will be placed over the area that has sutures. This will help prevent bleeding. Please, follow these instructions as they will help you to prevent complications as your wound heals.        For the First 48 hours After Surgery:    1. Leave the pressure bandage on and keep it dry. If it should come loose, you may retape it, but do not take it off.    2. Relax and take it easy. Do not do any vigorous exercise, heavy lifting, or bending forward. This could cause the wound to bleed.    3. Post-operative pain is usually mild. You may take plain or extra strength Tylenol every 4 hours as needed (do not take more than 4,000mg in one day) if you have no liver problems and your doctor says it is okay. Do not take any medicine that contains aspirin, ibuprofen or motrin unless you have been recommended these by a doctor.  Avoid alcohol and vitamin E as these may increase your tendency to bleed.    4. You may put an ice pack around the bandaged area for 20 minutes every 2-3 hours. This may help reduce swelling, bruising, and pain. Make sure the ice pack is waterproof so that the pressure bandage does not get wet.     5. You may see a small amount  of drainage or blood on your pressure bandage. This is normal. However, if drainage or bleeding continues or saturates the bandage, you will need to apply firm pressure over the bandage with a washcloth for 15 minutes. If bleeding continues after applying pressure for 15 minutes then go to the nearest emergency room.        48 Hours After Surgery:    Carefully remove the outer pressure bandage but leave the steri strips in place for these will continue to give the wound edges extra support as they are healing. You may trim the edges of the steri strip that have curled up but do not remove steri strips until they fall off. You may also now shower but do not saturate the wound or steri strips for this will cause your steri strips to fall off. You should cover the steri strips and wound with a wash cloth while showering to protect the area from water. It is ok if the steri strips become a bit damp but do not saturate.     Daily Wound Care:    1. Once the steri strips fall off wash wound with a mild soap and water.  Use caution when washing the wound, be gentle and do not let the forceful shower stream hit the wound directly. DO NOT WASH WITH HYDROGEN PEROXIDE FOR THIS MIGHT CAUSE THE SUTURES TO DISSOLVE FASTER THAN WHAT WE WANT.     2. PAT DRY.    3. Once steri strips fall off apply Vaseline (from a new container or tube) over the suture line with a Q-tip until it is completely healed. It is very important to keep the wound continuously moist, as wounds heal best in a moist environment.    4. Keep the site covered until site is healed, you can cover it with a Telfa (non-stick) dressing and tape or a band-aid. While your steri strips are on you can use them as your bandage.    5. Once steri strips fall off if you are unable to keep wound covered, you must apply Vaseline every 2 - 3 hours (while awake) to ensure it is being kept moist for optimal healing until completely healed. A dressing overnight is recommended to  keep the area moist.        Call Us If:    1. You have pain that is not controlled with Tylenol.    2. You have signs or symptoms of an infection, such as: fever over 100 degrees F, redness, warmth, or foul-smelling or yellow/creamy drainage from the wound.        Who should I call with questions?  [facility]: [phone]   [facility]: [phone]  For urgent needs outside of business hours call the [facility] at [phone] and ask for the dermatology resident on call

## 2018-12-10 NOTE — LETTER
12/10/2018         RE: rKistine Cox  [address]  [facility] [address]        Dear Colleague,    Thank you for referring your patient, Kristine Cox, to the [facility]. Please see a copy of my visit note below.    DERMATOLOGY EXCISION PROCEDURE NOTE    Dermatology Problem List:  1. Compound dysplastic nevus with moderate to severe atypia - right upper back   - s/p excision 12/10/18     NAME OF PROCEDURE: Excision intermediate layered linear closure  Staff surgeon: Doroteo Greco DO  Scrub Nurse: Judith Melendez LPN     PRE-OPERATIVE DIAGNOSIS:  Compound dysplastic nevus with moderate to severe atypia   POST-OPERATIVE DIAGNOSIS: same   FINAL EXCISION SIZE(DEFECT SIZE): 1.4 x 1.4 cm, with 3 mm margin   FINAL REPAIR LENGTH: 3.5 cm     INDICATIONS: This patient presented with a 0.8 x 0.8 cm atypical nevus of the right upper back. Excision was indicated. We discussed the principles of treatment and most likely complications including scarring, bleeding, infection, incomplete excision, wound dehiscence, pain, nerve damage, and recurrence. Informed consent was obtained and the patient underwent the procedure as follows:    PROCEDURE: The patient was taken to the operative suite. Time-out was performed.  The treatment area was anesthetized with 1% lidocaine and epinephrine (1:100,000). The area was prepped with Chlorhexidine and rinsed with sterile saline and draped with sterile towels. The lesion was delineated and excised down to subcutaneous fat in a elliptical manner. Hemostasis was obtained by electrocoagulation.     REPAIR: An intermediate layered linear closure was selected as the procedure which would maximally preserve both function and cosmesis.    After the excision of the tumor, the area was carefully undermined. Hemostasis was obtained with electrocoagulation.  Closure was oriented so that the wound was in the patient's natural skin tension lines. The subcutaneous and dermal layers were then  closed with 3-0 Vicryl sutures. The epidermis was then carefully approximated along the length of the wound using 4-0 Vicryl running subcuticular sutures.     The final wound length was 3.5 cm. A total of 6.0 ml of anesthesia was administered for all surgical sites. Estimated blood loss was less than 10 ml for all surgical sites. A sterile pressure dressing was applied and wound care instructions, with a written handout, were given. The [facility] was [facility] alert and ambulatory.    Follow-up as needed for wound evaluation.       Anatomic Pathology Results: pending      Staff Involved:    Scribe Disclosure  I, Art Chapman, aminta serving as a scribe to document services personally performed by Dr. Doroteo Greco DO, based on data collection and the provider's statements to me.     Provider Disclosure:   The documentation recorded by the scribe accurately reflects the services I personally performed and the decisions made by me.  I personally performed the procedures today.    Doroteo Greco DO    Department of Dermatology  [facility]: Phone: [phone], Fax:[phone]  [facility]: Phone: [phone], Fax: [phone]    Performed at:   [facility]    [address] [place] [address]    Again, thank you for allowing me to participate in the care of your patient.        Sincerely,        Doroteo Greco MD

## 2018-12-10 NOTE — PROGRESS NOTES
DERMATOLOGY EXCISION PROCEDURE NOTE    Dermatology Problem List:  1. Compound dysplastic nevus with moderate to severe atypia - right upper back   - s/p excision 12/10/18     NAME OF PROCEDURE: Excision intermediate layered linear closure  Staff surgeon: Doroteo Greco DO  Scrub Nurse: Judith Melendez LPN     PRE-OPERATIVE DIAGNOSIS:  Compound dysplastic nevus with moderate to severe atypia   POST-OPERATIVE DIAGNOSIS: same   FINAL EXCISION SIZE(DEFECT SIZE): 1.4 x 1.4 cm, with 3 mm margin   FINAL REPAIR LENGTH: 3.5 cm     INDICATIONS: This patient presented with a 0.8 x 0.8 cm atypical nevus of the right upper back. Excision was indicated. We discussed the principles of treatment and most likely complications including scarring, bleeding, infection, incomplete excision, wound dehiscence, pain, nerve damage, and recurrence. Informed consent was obtained and the patient underwent the procedure as follows:    PROCEDURE: The patient was taken to the operative suite. Time-out was performed.  The treatment area was anesthetized with 1% lidocaine and epinephrine (1:100,000). The area was prepped with Chlorhexidine and rinsed with sterile saline and draped with sterile towels. The lesion was delineated and excised down to subcutaneous fat in a elliptical manner. Hemostasis was obtained by electrocoagulation.     REPAIR: An intermediate layered linear closure was selected as the procedure which would maximally preserve both function and cosmesis.    After the excision of the tumor, the area was carefully undermined. Hemostasis was obtained with electrocoagulation.  Closure was oriented so that the wound was in the patient's natural skin tension lines. The subcutaneous and dermal layers were then closed with 3-0 Vicryl sutures. The epidermis was then carefully approximated along the length of the wound using 4-0 Vicryl running subcuticular sutures.     The final wound length was 3.5 cm. A total of 6.0 ml of anesthesia was  administered for all surgical sites. Estimated blood loss was less than 10 ml for all surgical sites. A sterile pressure dressing was applied and wound care instructions, with a written handout, were given. The patient was discharged from the [facility] alert and ambulatory.    Follow-up as needed for wound evaluation.       Anatomic Pathology Results: pending      Staff Involved:    Scribe Disclosure  IArt, am serving as a scribe to document services personally performed by Dr. Doroteo Greco DO, based on data collection and the provider's statements to me.     Provider Disclosure:   The documentation recorded by the scribe accurately reflects the services I personally performed and the decisions made by me.  I personally performed the procedures today.    Doroteo Greco DO    Department of Dermatology  [facility]: Phone: [phone], Fax:[phone]  [facility]: Phone: [phone], Fax: [phone]    Performed at:   [facility]    [address] [place] [address]

## 2018-12-10 NOTE — NURSING NOTE
Kristine Cox's goals for this visit include:   Chief Complaint   Patient presents with     Procedure     right upper back mod-severe DN       She requests these members of her care team be copied on today's visit information:     PCP: Carlie Corrales    Referring Provider:  Babita Richardson MD  [address] [place] [address]    /82 (BP Location: Left arm, Patient Position: Sitting, Cuff Size: Adult Large)   Pulse 75   LMP  (LMP Unknown)   SpO2 97%     Do you need any medication refills at today's visit? No    Judith Melendez LPN      Excision Intake                                                    /82 (BP Location: Left arm, Patient Position: Sitting, Cuff Size: Adult Large)   Pulse 75   LMP  (LMP Unknown)   SpO2 97%     Do you take the following medications:  Coumadin, Eliquis, Pradaxa, Xarelto:   NO  -If on Coumadin, INR should be checked within 7 days of surgery.  Range is 3.5 or less or within therapeutic range.  Antibiotic Prophylaxis:  NO    Do you have an iodine allergy:  NO    Past Medical History                                                    Do you currently or have you previously had any of the following conditions:       HIV/AIDS:  NO    Hepatitis:  NO    Suppressed Immune System:  NO    Autoimmune disorder (eg RA, Lupus):  NO    Fever blisters/herpes, cold sores:  NO    Kidney disease:  NO  Creatinine   Date Value Ref Range Status   10/03/2018 0.81 0.52 - 1.04 mg/dL Final   ]    Pacemaker or Defibrillator:  NO.      History of artificial or heart valve replacement:  NO.      Endocarditis: NO    Organ transplant: NO.      Joint replacement within past 2 years: NO    Previous prosthetic joint infection: NO    Diabetes: NO    Pregnant:: NO    Tobacco use:  YES.    History   Smoking Status     Current Every Day Smoker     Packs/day: 0.50     Years: 40.00     Types: Cigarettes   Smokeless Tobacco     Never Used     Reviewed by:  Judith Melendez LPN

## 2018-12-13 LAB — COPATH REPORT: NORMAL

## 2018-12-14 ENCOUNTER — TELEPHONE (OUTPATIENT)
Dept: DERMATOLOGY | Facility: CLINIC | Age: 58
End: 2018-12-14

## 2018-12-14 NOTE — TELEPHONE ENCOUNTER
Notes recorded by Judith Melendez LPN on 12/14/2018 at 3:52 PM CST  Called patient and informed her of results. Patient verbalized understanding and had no further questions or concerns at this time.     Judith Melendez LPN    ------    Notes recorded by Doroteo Heredia MD on 12/14/2018 at 3:05 PM CST  Please contact the patient to let her know the pathology results.   Excision margins are clear.   As long as the wound is healing well, no additional surgery is necessary. Thank you.   Dermatological path order and indications   Order: [id number]   Status:  Final result   Visible to patient:  No (Not Released) Dx:  Dysplastic nevus of trunk   Component 4d ago   Copath Report Patient Name: NELL RUBIO   MR#: [id number]   Specimen #: [id number]   Collected: 12/10/2018   Received: 12/11/2018   Reported: 12/13/2018 19:21   Ordering Phy(s): DOROTEO HEREDIA     For improved result formatting, select 'View Enhanced Report Format' under    Linked Documents section.     SPECIMEN(S):   Skin, right upper back     FINAL DIAGNOSIS:   Skin, right upper back:   - Scar from the prior surgical procedure, with no evidence of residual   lesion - (see description              Judith Melendez LPN

## 2019-01-14 ENCOUNTER — OFFICE VISIT (OUTPATIENT)
Dept: FAMILY MEDICINE | Facility: OTHER | Age: 59
End: 2019-01-14
Payer: COMMERCIAL

## 2019-01-14 ENCOUNTER — ANCILLARY PROCEDURE (OUTPATIENT)
Dept: GENERAL RADIOLOGY | Facility: OTHER | Age: 59
End: 2019-01-14
Payer: COMMERCIAL

## 2019-01-14 VITALS
TEMPERATURE: 96.2 F | SYSTOLIC BLOOD PRESSURE: 134 MMHG | RESPIRATION RATE: 20 BRPM | HEART RATE: 108 BPM | DIASTOLIC BLOOD PRESSURE: 82 MMHG | HEIGHT: 68 IN | BODY MASS INDEX: 36.68 KG/M2 | OXYGEN SATURATION: 99 % | WEIGHT: 242 LBS

## 2019-01-14 DIAGNOSIS — M25.561 ACUTE PAIN OF RIGHT KNEE: ICD-10-CM

## 2019-01-14 DIAGNOSIS — M25.561 ACUTE PAIN OF RIGHT KNEE: Primary | ICD-10-CM

## 2019-01-14 PROCEDURE — 99213 OFFICE O/P EST LOW 20 MIN: CPT | Performed by: NURSE PRACTITIONER

## 2019-01-14 PROCEDURE — 73562 X-RAY EXAM OF KNEE 3: CPT | Mod: RT

## 2019-01-14 ASSESSMENT — PAIN SCALES - GENERAL: PAINLEVEL: MILD PAIN (3)

## 2019-01-14 ASSESSMENT — MIFFLIN-ST. JEOR: SCORE: 1726.2

## 2019-01-14 NOTE — PATIENT INSTRUCTIONS
I did not see any abnormalities on your x-ray other than some mild arthritis.  The radiologist will review it as well and they will call if they see anything I did not see.     I think you should see orthopedics about this.   I put in a referral.

## 2019-01-14 NOTE — PROGRESS NOTES
"  SUBJECTIVE:   Kristine Cox is a 58 year old female who presents to clinic today for the following health issues:      Musculoskeletal problem/pain      Duration: on/off for many years, getting worse lately    Description  Location: right knee    Intensity:  Mild to severe    Accompanying signs and symptoms: swelling    History  Previous problem: YES  Previous evaluation:  Had ACL/Meniscus surgery 12 yrs ago    Precipitating or alleviating factors:  Trauma or overuse: no   Aggravating factors include: none    Therapies tried: rest/inactivity    Has had intermittent right knee pain for years.  This was typically exacerbated by activity, flared up and then improved after a few days of rest.  Now having daily, constant pain.  It is worse with standing, walking, stairs.  She has been taking Aleve without much improvement.  History of ACL and meniscal repair in this knee over 10 years ago.  She feels as if there is something \"catching or getting stuck\" in the knee joint.  No instability.  No warmth, swelling or erythema.  No injuries.     Problem list and histories reviewed & adjusted, as indicated.  Additional history: as documented    Patient Active Problem List   Diagnosis     CARDIOVASCULAR SCREENING; LDL GOAL LESS THAN 160     HTN, goal below 140/90     Vaginal atrophy     S/P gastric bypass     History of cervical spinal surgery     Lesion of sciatic nerve, right     Lumbosacral radiculopathy     Cervicalgia     B12 deficiency anemia     Lumbar foraminal stenosis     Protruded cervical disc     Piriformis syndrome, right     Low serum sodium     Tobacco abuse     Severe obesity (BMI 35.0-39.9)     Past Surgical History:   Procedure Laterality Date     C EXCIS KNEE CARTILAGE,MEDIAL OR LAT  07/02/07    right knee     CHOLECYSTECTOMY       COLONOSCOPY  9/20/2011    Procedure:COMBINED COLONOSCOPY, REMOVE TUMOR/POLYP/LESION BY SNARE; Colonoscopy with polypectomy by snare; Surgeon:TERESO LIANG; Location:[facility] "     COLONOSCOPY N/A 9/18/2017    Procedure: COMBINED COLONOSCOPY, SINGLE OR MULTIPLE BIOPSY/POLYPECTOMY BY BIOPSY;;  Surgeon: Cale James MD;  Location: [facility]     DAVINCI BYPASS GASTRIC JOSHUA-EN-Y  1993     DISCECTOMY CERVICAL MINIMALLY INVASIVE TWO LEVELS       FUSION CERVICAL ANTERIOR TWO LEVELS      C5-7     HC LIGMT REVISION,KNEE,INTRA-ARTIC  07/02/07     HYSTERECTOMY VAGINAL       HYSTERECTOMY, PAP NO LONGER INDICATED       TONSILLECTOMY         Social History     Tobacco Use     Smoking status: Current Every Day Smoker     Packs/day: 0.50     Years: 40.00     Pack years: 20.00     Types: Cigarettes     Smokeless tobacco: Never Used   Substance Use Topics     Alcohol use: Yes     Alcohol/week: 3.0 oz     Types: 5 Glasses of wine per week     Comment: 5 per week     Family History   Problem Relation Age of Onset     Heart Disease Father         heart murmur and CHF     Hypertension Brother      Heart Disease Brother         stents     Thyroid Disease Mother         hypothyroid     Diabetes Mother      Skin Cancer Mother      Hypertension Sister          Current Outpatient Medications   Medication Sig Dispense Refill     calcium-vitamin D (CALTRATE) 600-400 MG-UNIT per tablet Take 2 tablets by mouth daily.       cholecalciferol (VITAMIN D3) 1000 UNIT capsule Take 2 capsules by mouth daily.       cyanocobalamin (VITAMIN B12) 1000 MCG/ML injection INJECT 1 ML AS DIRECTED ONCE EVERY 30 DAYS 1 mL 11     docusate sodium (COLACE) 100 MG capsule Take 1 capsule (100 mg) by mouth daily 30 capsule 1     lisinopril (PRINIVIL/ZESTRIL) 10 MG tablet Take 1 tablet (10 mg) by mouth daily Fill when requested 90 tablet 3     naproxen (NAPROSYN) 500 MG tablet Take 1 tablet (500 mg) by mouth daily       Allergies   Allergen Reactions     Contrast Dye Hives and Itching     No Known Drug Allergy      BP Readings from Last 3 Encounters:   01/14/19 134/82   12/10/18 134/82   10/03/18 116/72    Wt Readings from Last 3  "Encounters:   01/14/19 109.8 kg (242 lb)   10/03/18 109.3 kg (241 lb)   08/30/17 106.6 kg (235 lb)               Reviewed and updated as needed this visit by clinical staff  Tobacco  Allergies  Meds  Med Hx  Surg Hx  Fam Hx  Soc Hx      Reviewed and updated as needed this visit by Provider         ROS:  Constitutional, HEENT, cardiovascular, pulmonary, gi and gu systems are negative, except as otherwise noted.    OBJECTIVE:     /82   Pulse 108   Temp 96.2  F (35.7  C) (Temporal)   Resp 20   Ht 1.727 m (5' 8\")   Wt 109.8 kg (242 lb)   LMP  (LMP Unknown)   SpO2 99%   BMI 36.80 kg/m    Body mass index is 36.8 kg/m .  GENERAL: alert, no distress and obese  NECK: no adenopathy, no asymmetry, masses, or scars and thyroid normal to palpation  RESP: lungs clear to auscultation - no rales, rhonchi or wheezes  CV: regular rate and rhythm, normal S1 S2, no S3 or S4, no murmur, click or rub,   MS: extremities normal- no gross deformities noted.  Right knee has no deformity, swelling or erythema.  She has full ROM. Mild limp with walking.  No instability noted on exam.  Mild anterior tenderness    Diagnostic Test Results:  Right knee x-ray: negative for acute findings.  See report.       ASSESSMENT/PLAN:         1. Acute pain of right knee  Previous history of ACL repair.  I am going to have her see orthopedics for further evaluation.  She will likely need MRI but will defer this to orthopedics.   - XR Knee Right 3 Views; Future  - ORTHOPEDICS ADULT REFERRAL    See Patient Instructions    LUZ Mock [facility]  "

## 2019-01-15 NOTE — PROGRESS NOTES
ORTHOPEDIC CLINIC CONSULT      Kristine Cox is a 58 year old female who is seen in consultation at the request of Carlie Corrales.  History of Present illness:  Kristine presents for evaluation of:   1.) right knee  Onset:  years    Symptoms brought on by nothing.   Character:  sharp and radiation of pain up into the upper leg occ.    Progression of symptoms:  worse.    Previous similar pain: no .   Pain Level:  2/10.   Previous treatments:  NSAID - naproxen.  Currently on Blood thinners? no  Diagnosis of Diabetes? no    Patient states history of Anterior Cruciate Ligament repair 11-12 years ago.  Did well initially after surgery.  Pain past couple years, now more frequent.  She says some days are hard to straighten and some days difficult to bend.  Variable where pain is and function of knee.  Feels like something in the knee.  Patient began stationary bike but increase in pain.  Knee feels stable per patient.  Patient states she does not do stairs and they have moved recently which does not have stairs within the home.  She admits she has not kept up with any type of strengthening for her right knee.    Patient's past medical, surgical, social and family histories reviewed.       Past Medical History:   Diagnosis Date     Hypertension      Iron deficiency anemia 2016     Lumbar foraminal stenosis 8/2015    noted on MRI     Piriformis syndrome, right 10/10/2016    Dx by neuro     Protruded cervical disc 8/2015    C4-5 noted on MRI       Past Surgical History:   Procedure Laterality Date     C EXCIS KNEE CARTILAGE,MEDIAL OR LAT  07/02/07    right knee     CHOLECYSTECTOMY       COLONOSCOPY  9/20/2011    Procedure:COMBINED COLONOSCOPY, REMOVE TUMOR/POLYP/LESION BY SNARE; Colonoscopy with polypectomy by snare; Surgeon:TERESO LIANG; Location: GI     COLONOSCOPY N/A 9/18/2017    Procedure: COMBINED COLONOSCOPY, SINGLE OR MULTIPLE BIOPSY/POLYPECTOMY BY BIOPSY;;  Surgeon: Cale James MD;  Location:  GI      DAVINCI BYPASS GASTRIC JOSHUA-EN-Y  1993     DISCECTOMY CERVICAL MINIMALLY INVASIVE TWO LEVELS       FUSION CERVICAL ANTERIOR TWO LEVELS      C5-7     HC LIGMT REVISION,KNEE,INTRA-ARTIC  07/02/07     HYSTERECTOMY VAGINAL       HYSTERECTOMY, PAP NO LONGER INDICATED       TONSILLECTOMY         Home Medications:  Prior to Admission medications    Medication Sig Start Date End Date Taking? Authorizing Provider   calcium-vitamin D (CALTRATE) 600-400 MG-UNIT per tablet Take 2 tablets by mouth daily.   Yes Reported, Patient   cholecalciferol (VITAMIN D3) 1000 UNIT capsule Take 2 capsules by mouth daily.   Yes Reported, Patient   cyanocobalamin (VITAMIN B12) 1000 MCG/ML injection INJECT 1 ML AS DIRECTED ONCE EVERY 30 DAYS 10/3/18  Yes Carlie Corrales APRN CNP   docusate sodium (COLACE) 100 MG capsule Take 1 capsule (100 mg) by mouth daily 9/8/15  Yes Maci Cheatham APRN CNP   lisinopril (PRINIVIL/ZESTRIL) 10 MG tablet Take 1 tablet (10 mg) by mouth daily Fill when requested 10/3/18  Yes Carlie Corrales APRN CNP   naproxen (NAPROSYN) 500 MG tablet Take 1 tablet (500 mg) by mouth daily 10/3/18  Yes Carlie Corrales APRN CNP       Allergies   Allergen Reactions     Contrast Dye Hives and Itching     No Known Drug Allergy        Social History     Occupational History     Not on file   Tobacco Use     Smoking status: Current Every Day Smoker     Packs/day: 0.50     Years: 40.00     Pack years: 20.00     Types: Cigarettes     Smokeless tobacco: Never Used   Substance and Sexual Activity     Alcohol use: Yes     Alcohol/week: 3.0 oz     Types: 5 Glasses of wine per week     Comment: 5 per week     Drug use: No     Sexual activity: Yes     Partners: Male     Birth control/protection: Surgical   Patient does not have an occupation outside of the home.     Family History   Problem Relation Age of Onset     Heart Disease Father         heart murmur and CHF     Hypertension Brother      Heart Disease Brother          "stents     Thyroid Disease Mother         hypothyroid     Diabetes Mother      Skin Cancer Mother      Hypertension Sister        REVIEW OF SYSTEMS    General: Negative for fever or fatigue    Psych:  negative anxiety or depression     Ophthalmic:  Corrective lenses?  Yes    ENT:  Hearing difficulty? No    CV: negative for chest pain, venous insufficiency, no history of MI or stroke    Endocrine:  negative diabetes     Urology:  negative kidney disease    Resp:  Normal respiratory effort, no history of pulmonary disease or asthma    Skin: negative for cuts/sores or redness    Musculoskeletal: as above    Neurologic:negative for numbness/tingling    Hematologic: negative for bleeding disorder, does not use of prescription anticoagulants       Physical Exam:    Vitals: /80   Ht 1.727 m (5' 8\")   Wt 109.8 kg (242 lb)   LMP  (LMP Unknown)   BMI 36.80 kg/m    BMI= Body mass index is 36.8 kg/m .    GENERAL APPEARANCE: Healthy, alert, no distress    SKIN:  negative for suspicious lesions or rashes    NEURO: Normal strength and tone, mentation intact and speech normal    PSYCH:   Mentation appears Normal and affect normal/bright    RESPIRATORY: negative for respiratory distress.    EYES: negative for Conjunctivitis    Cardiovascular: no Edema, no vascular discoloration      GAIT: non-antalgic    JOINT/EXTREMITIES:  Right knee:  No swelling or redness,  range of motion:  Full extension and flexion  Quad tone is significantly limited on the right vs the left.  Patient with difficulty maintaining resistance in abduction. Negative obers  Pain with palpation of patella, no joint line tenderness  Knock knee appearance    Radiographs: xray images reveal good spacing of lateral and medial compartments.  Patella appears to ride appropriately in femoral groove.  Tunnels for previous Anterior Cruciate Ligament visualized.   Images are non-weightbearing.   Independent visualization of the films was made.     Impression:      " ICD-10-CM    1. Patellofemoral pain syndrome of right knee M22.2X1 PHYSICAL THERAPY REFERRAL   2. H/O reconstruction of anterior cruciate ligament tear Z98.890      Patient with a history of ACL reconstruction that is very stable.  Patient has developed patellofemoral syndrome with an increase of pain with bent knee activities.  On examination, patient with significant decrease of strength and quad tone on the right.    Plan:  Patient and her  who accompanies her advised of the above.  It is recommended patient undergo minimum 6 weeks physical therapy for comprehensive patellofemoral program.  If patient struggles with the program and getting started then she should return for cortisone injection.  Patient should follow-up 6 weeks after beginning physical therapy program for reevaluation.    BP Readings from Last 1 Encounters:   01/17/19 130/80       BP noted to be well controlled today in office.     Patient is evaluated with and plan developed in conjunction with Dr. Chavis    Scribed by  Babita Young PA-C   1/17/2019  9:20 AM        I attest I have seen and evaluated the patient.  I agree with above impression and plan.    Ino Chavis MD

## 2019-01-17 ENCOUNTER — OFFICE VISIT (OUTPATIENT)
Dept: ORTHOPEDICS | Facility: CLINIC | Age: 59
End: 2019-01-17
Payer: COMMERCIAL

## 2019-01-17 VITALS
SYSTOLIC BLOOD PRESSURE: 130 MMHG | WEIGHT: 242 LBS | BODY MASS INDEX: 36.68 KG/M2 | HEIGHT: 68 IN | DIASTOLIC BLOOD PRESSURE: 80 MMHG

## 2019-01-17 DIAGNOSIS — M22.2X1 PATELLOFEMORAL PAIN SYNDROME OF RIGHT KNEE: Primary | ICD-10-CM

## 2019-01-17 DIAGNOSIS — Z98.890 H/O RECONSTRUCTION OF ANTERIOR CRUCIATE LIGAMENT TEAR: ICD-10-CM

## 2019-01-17 PROCEDURE — 99203 OFFICE O/P NEW LOW 30 MIN: CPT | Performed by: ORTHOPAEDIC SURGERY

## 2019-01-17 ASSESSMENT — MIFFLIN-ST. JEOR: SCORE: 1726.2

## 2019-01-17 ASSESSMENT — PAIN SCALES - GENERAL: PAINLEVEL: MILD PAIN (2)

## 2019-01-17 NOTE — LETTER
1/17/2019         RE: Kristine Cox  [address]  [facility] [address]        Dear Colleague,    Thank you for referring your patient, Kristine Cox, to the [facility]. Please see a copy of my visit note below.    ORTHOPEDIC CLINIC CONSULT      Kristine Cox is a 58 year old female who is seen in consultation at the request of Carlie Corrales.  History of Present illness:  Kristine presents for evaluation of:   1.) right knee  Onset:  years    Symptoms brought on by nothing.   Character:  sharp and radiation of pain up into the upper leg occ.    Progression of symptoms:  worse.    Previous similar pain: no .   Pain Level:  2/10.   Previous treatments:  NSAID - naproxen.  Currently on Blood thinners? no  Diagnosis of Diabetes? no    Patient states history of Anterior Cruciate Ligament repair 11-12 years ago.  Did well initially after surgery.  Pain past couple years, now more frequent.  She says some days are hard to straighten and some days difficult to bend.  Variable where pain is and function of knee.  Feels like something in the knee.  Patient began stationary bike but increase in pain.  Knee feels stable per patient.  Patient states she does not do stairs and they have moved recently which does not have stairs within the home.  She admits she has not kept up with any type of strengthening for her right knee.    Patient's past medical, surgical, social and family histories reviewed.       Past Medical History:   Diagnosis Date     Hypertension      Iron deficiency anemia 2016     Lumbar foraminal stenosis 8/2015    noted on MRI     Piriformis syndrome, right 10/10/2016    Dx by neuro     Protruded cervical disc 8/2015    C4-5 noted on MRI       Past Surgical History:   Procedure Laterality Date     C EXCIS KNEE CARTILAGE,MEDIAL OR LAT  07/02/07    right knee     CHOLECYSTECTOMY       COLONOSCOPY  9/20/2011    Procedure:COMBINED COLONOSCOPY, REMOVE TUMOR/POLYP/LESION BY SNARE; Colonoscopy with  polypectomy by snare; Surgeon:TERESO LIANG; Location: GI     COLONOSCOPY N/A 9/18/2017    Procedure: COMBINED COLONOSCOPY, SINGLE OR MULTIPLE BIOPSY/POLYPECTOMY BY BIOPSY;;  Surgeon: Cale James MD;  Location:  GI     DAVINCI BYPASS GASTRIC JOSHUA-EN-Y  1993     DISCECTOMY CERVICAL MINIMALLY INVASIVE TWO LEVELS       FUSION CERVICAL ANTERIOR TWO LEVELS      C5-7     HC LIGMT REVISION,KNEE,INTRA-ARTIC  07/02/07     HYSTERECTOMY VAGINAL       HYSTERECTOMY, PAP NO LONGER INDICATED       TONSILLECTOMY         Home Medications:  Prior to Admission medications    Medication Sig Start Date End Date Taking? Authorizing Provider   calcium-vitamin D (CALTRATE) 600-400 MG-UNIT per tablet Take 2 tablets by mouth daily.   Yes Reported, Patient   cholecalciferol (VITAMIN D3) 1000 UNIT capsule Take 2 capsules by mouth daily.   Yes Reported, Patient   cyanocobalamin (VITAMIN B12) 1000 MCG/ML injection INJECT 1 ML AS DIRECTED ONCE EVERY 30 DAYS 10/3/18  Yes Carlie Corrales APRN CNP   docusate sodium (COLACE) 100 MG capsule Take 1 capsule (100 mg) by mouth daily 9/8/15  Yes Maci Cheatham APRN CNP   lisinopril (PRINIVIL/ZESTRIL) 10 MG tablet Take 1 tablet (10 mg) by mouth daily Fill when requested 10/3/18  Yes Carlie Corrales APRN CNP   naproxen (NAPROSYN) 500 MG tablet Take 1 tablet (500 mg) by mouth daily 10/3/18  Yes Carlie Corrales APRN CNP       Allergies   Allergen Reactions     Contrast Dye Hives and Itching     No Known Drug Allergy        Social History     Occupational History     Not on file   Tobacco Use     Smoking status: Current Every Day Smoker     Packs/day: 0.50     Years: 40.00     Pack years: 20.00     Types: Cigarettes     Smokeless tobacco: Never Used   Substance and Sexual Activity     Alcohol use: Yes     Alcohol/week: 3.0 oz     Types: 5 Glasses of wine per week     Comment: 5 per week     Drug use: No     Sexual activity: Yes     Partners: Male     Birth control/protection:  "Surgical   Patient does not have an occupation outside of the home.     Family History   Problem Relation Age of Onset     Heart Disease Father         heart murmur and CHF     Hypertension Brother      Heart Disease Brother         stents     Thyroid Disease Mother         hypothyroid     Diabetes Mother      Skin Cancer Mother      Hypertension Sister        REVIEW OF SYSTEMS    General: Negative for fever or fatigue    Psych:  negative anxiety or depression     Ophthalmic:  Corrective lenses?  Yes    ENT:  Hearing difficulty? No    CV: negative for chest pain, venous insufficiency, no history of MI or stroke    Endocrine:  negative diabetes     Urology:  negative kidney disease    Resp:  Normal respiratory effort, no history of pulmonary disease or asthma    Skin: negative for cuts/sores or redness    Musculoskeletal: as above    Neurologic:negative for numbness/tingling    Hematologic: negative for bleeding disorder, does not use of prescription anticoagulants       Physical Exam:    Vitals: /80   Ht 1.727 m (5' 8\")   Wt 109.8 kg (242 lb)   LMP  (LMP Unknown)   BMI 36.80 kg/m     BMI= Body mass index is 36.8 kg/m .    GENERAL APPEARANCE: Healthy, alert, no distress    SKIN:  negative for suspicious lesions or rashes    NEURO: Normal strength and tone, mentation intact and speech normal    PSYCH:   Mentation appears Normal and affect normal/bright    RESPIRATORY: negative for respiratory distress.    EYES: negative for Conjunctivitis    Cardiovascular: no Edema, no vascular discoloration      GAIT: non-antalgic    JOINT/EXTREMITIES:  Right knee:  No swelling or redness,  range of motion:  Full extension and flexion  Quad tone is significantly limited on the right vs the left.  Patient with difficulty maintaining resistance in abduction. Negative obers  Pain with palpation of patella, no joint line tenderness  Knock knee appearance    Radiographs: xray images reveal good spacing of lateral and medial " compartments.  Patella appears to ride appropriately in femoral groove.  Tunnels for previous Anterior Cruciate Ligament visualized.   Images are non-weightbearing.   Independent visualization of the films was made.     Impression:      ICD-10-CM    1. Patellofemoral pain syndrome of right knee M22.2X1 PHYSICAL THERAPY REFERRAL   2. H/O reconstruction of anterior cruciate ligament tear Z98.890      Patient with a history of ACL reconstruction that is very stable.  Patient has developed patellofemoral syndrome with an increase of pain with bent knee activities.  On examination, patient with significant decrease of strength and quad tone on the right.    Plan:  Patient and her  who accompanies her advised of the above.  It is recommended patient undergo minimum 6 weeks physical therapy for comprehensive patellofemoral program.  If patient struggles with the program and getting started then she should return for cortisone injection.  Patient should follow-up 6 weeks after beginning physical therapy program for reevaluation.    BP Readings from Last 1 Encounters:   01/17/19 130/80       BP noted to be well controlled today in office.     Patient is evaluated with and plan developed in conjunction with Dr. Chavis    Scribed by  Babita Young PA-C   1/17/2019  9:20 AM        I attest I have seen and evaluated the patient.  I agree with above impression and plan.    Ion Chavis MD                  Again, thank you for allowing me to participate in the care of your patient.        Sincerely,        Ion Chavis MD

## 2019-01-29 ENCOUNTER — HOSPITAL ENCOUNTER (OUTPATIENT)
Dept: PHYSICAL THERAPY | Facility: OTHER | Age: 59
Setting detail: THERAPIES SERIES
End: 2019-01-29
Attending: NURSE PRACTITIONER
Payer: COMMERCIAL

## 2019-01-29 DIAGNOSIS — M22.2X1 PATELLOFEMORAL PAIN SYNDROME OF RIGHT KNEE: ICD-10-CM

## 2019-01-29 PROCEDURE — 97161 PT EVAL LOW COMPLEX 20 MIN: CPT | Mod: GP | Performed by: PHYSICAL THERAPIST

## 2019-01-29 PROCEDURE — 97110 THERAPEUTIC EXERCISES: CPT | Mod: GP | Performed by: PHYSICAL THERAPIST

## 2019-01-29 NOTE — PROGRESS NOTES
01/29/19 1035   General Information   Type of Visit Initial OP Ortho PT Evaluation   Start of Care Date 01/29/19   Referring Physician jamaal benedict MD    Patient/Family Goals Statement right knee pain    Orders Evaluate and Treat   Date of Order 01/17/19   Insurance Type Other  (preferred one )   Medical Diagnosis patellofemoral pain syndrome of right knee M22.2x1   Surgical/Medical history reviewed Yes   Precautions/Limitations no known precautions/limitations   Body Part(s)   Body Part(s) Knee   Presentation and Etiology   Pertinent history of current problem (include personal factors and/or comorbidities that impact the POC) patient reports that last 1 progressively having pain more often stairs are a problem and if she steps wrong will have pop and limps for a few steps . PMH right ACL 12 years ago , neck surgery .    Impairments A. Pain;H. Impaired gait   Functional Limitations perform activities of daily living;perform desired leisure / sports activities   Symptom Location right knee    Onset date of current episode/exacerbation 01/29/18   Chronicity Chronic   Pain rating (0-10 point scale) Best (/10);Worst (/10)   Best (/10) 0/10   Worst (/10) 2/10   Pain quality B. Dull;C. Aching   Frequency of pain/symptoms C. With activity   Progression of symptoms since onset: Improved   Prior Level of Function   Functional Level Prior Comment no    Current Level of Function   Current Community Support Family/friend caregiver   Patient role/employment history C. Homemaker   Fall Risk Screen   Fall screen completed by PT   Have you fallen 2 or more times in the past year? No   Have you fallen and had an injury in the past year? No   Is patient a fall risk? No   Knee Objective Findings   Side (if bilateral, select both right and left) Right   Knee ROM Comment full AROM    Knee/Hip Strength Comments strength grossly 4/5    Anterior Drawer Test neg   Posterior Drawer Test neg   Varus Stress Test neg   Valgus Stress Test neg    Evita's Test neg   Palpation crepitis in right knee    Right Gastrocnemius Flexibility 10   Right Hamstring Flexibility 10   Planned Therapy Interventions   Planned Therapy Interventions ADL retraining;balance training;ROM;strengthening;stretching   Planned Modality Interventions   Planned Modality Interventions Comments as needed    Clinical Impression   Criteria for Skilled Therapeutic Interventions Met yes, treatment indicated   PT Diagnosis right patellar pain    Functional limitations due to impairments LEFS 51/80   Clinical Presentation Stable/Uncomplicated   Clinical Presentation Rationale patient seen due right knee pain presents with weakness Rx is instruction in HEP    Clinical Decision Making (Complexity) Low complexity   Predicted Duration of Therapy Intervention (days/wks) every other week x 2-3 months    Risk & Benefits of therapy have been explained Yes   Patient, Family & other staff in agreement with plan of care Yes   Education Assessment   Preferred Learning Style Listening;Reading;Demonstration   Barriers to Learning No barriers   ORTHO GOALS   PT Ortho Eval Goals 1;2   Ortho Goal 1   Goal Identifier 1   Goal Description instruction in HEP and compliant with it 5 of 7 days    Target Date 04/28/19   Ortho Goal 2   Goal Identifier 2   Goal Description patient to have improved tolerance to activity currently LEFS 51 goal 61   Target Date 04/28/19   Total Evaluation Time   PT Eval, Low Complexity Minutes (90048) 15

## 2019-02-12 ENCOUNTER — HOSPITAL ENCOUNTER (OUTPATIENT)
Dept: PHYSICAL THERAPY | Facility: OTHER | Age: 59
Setting detail: THERAPIES SERIES
End: 2019-02-12
Attending: ORTHOPAEDIC SURGERY
Payer: COMMERCIAL

## 2019-02-12 PROCEDURE — 97110 THERAPEUTIC EXERCISES: CPT | Mod: GP | Performed by: PHYSICAL THERAPIST

## 2019-04-04 NOTE — ADDENDUM NOTE
Encounter addended by: Flower Cr, PT on: 4/4/2019 9:39 AM   Actions taken: Episode resolved, Sign clinical note

## 2019-04-04 NOTE — PROGRESS NOTES
Outpatient Physical Therapy Discharge Note     Patient: Kristine POPE Dziuk  : 1960    Beginning/End Dates of Reporting Period:  2019 to 2019    Referring Provider: jamaal benedict MD     Therapy Diagnosis: knee pain      Client Self Report: knee is feeling stronger has increased to 25 reps , but pain is about the same , is very mindful when she walks to not twist knee     Objective Measurements:      patient seen for 2 Rx sessions for exercises in clinic and progression of HEP at last Rx she was completing the following   bridges , SLR , TKE , hip adduction isometric , hip abduction , prone knee flexion 15x goal 30  bosu 5 minutes , at home single leg balance , biodex x 10   She cancelled her last 2 Rx sessions      Goals:  Goal Identifier 1   Goal Description instruction in HEP and compliant with it 5 of 7 days    Target Date 19   Date Met      Progress:     Goal Identifier 2   Goal Description patient to have improved tolerance to activity currently LEFS 51 goal 61   Target Date 19   Date Met      Progress:     Goal Identifier     Goal Description     Target Date     Date Met      Progress:     Goal Identifier     Goal Description     Target Date     Date Met      Progress:     Goal Identifier     Goal Description     Target Date     Date Met      Progress:     Goal Identifier     Goal Description     Target Date     Date Met      Progress:     Goal Identifier     Goal Description     Target Date     Date Met      Progress:     Goal Identifier     Goal Description     Target Date     Date Met      Progress:     Progress Toward Goals:   Progress this reporting period: patient noting improvement and HEP going well           Plan:  Discharge from therapy.    Discharge:    Reason for Discharge: Patient has met all goals.  Patient has failed to schedule further appointments.    Equipment Issued: none    Discharge Plan: Patient to continue home program.

## 2019-06-17 PROBLEM — E66.01 SEVERE OBESITY WITH BODY MASS INDEX (BMI) OF 35.0 TO 39.9 WITH SERIOUS COMORBIDITY (H): Status: ACTIVE | Noted: 2017-08-30

## 2019-06-20 ENCOUNTER — OFFICE VISIT (OUTPATIENT)
Dept: DERMATOLOGY | Facility: CLINIC | Age: 59
End: 2019-06-20
Payer: COMMERCIAL

## 2019-06-20 DIAGNOSIS — L81.4 SOLAR LENTIGO: ICD-10-CM

## 2019-06-20 DIAGNOSIS — L82.1 SEBORRHEIC KERATOSIS: ICD-10-CM

## 2019-06-20 DIAGNOSIS — Z86.018 HISTORY OF DYSPLASTIC NEVUS: Primary | ICD-10-CM

## 2019-06-20 DIAGNOSIS — D18.01 CHERRY ANGIOMA: ICD-10-CM

## 2019-06-20 DIAGNOSIS — D23.9 DERMATOFIBROMA: ICD-10-CM

## 2019-06-20 DIAGNOSIS — D22.9 MULTIPLE BENIGN NEVI: ICD-10-CM

## 2019-06-20 DIAGNOSIS — L57.8 SUN-DAMAGED SKIN: ICD-10-CM

## 2019-06-20 PROCEDURE — 99214 OFFICE O/P EST MOD 30 MIN: CPT | Performed by: DERMATOLOGY

## 2019-06-20 NOTE — LETTER
6/20/2019         RE: Kristine Cox  [address]  [facility] [address]        Dear Colleague,    Thank you for referring your patient, Kristine Cox, to the [facility]. Please see a copy of my visit note below.    [facility] Dermatology Note      Dermatology Problem List:  1. DN  - Compound dysplastic nevus, moderate to severe atypia, right upper back, s/p excision 12/10/18   2. AK, s/p cryotherapy       Encounter Date: Jun 20, 2019    CC:  Chief Complaint   Patient presents with     Skin Check     hx of DN         History of Present Illness:  Ms. Kristine Cox is a 59 year old female with history of moderate to severe DN who presents as a follow up for a skin check. She was last seen by Dr. Greco 12/10/2018 for excision. She reports it is healing well but it is  at times. This seems to be gradually getting better. Today, she has a lesion of concern on her right forehead.  This is a new brown spot. It is not tender and has not bled. She denies any other specific areas of concern today. She has been very diligent with her sun protection so far this summer.     No other concerns addressed today.      Past Medical History:   Patient Active Problem List   Diagnosis     CARDIOVASCULAR SCREENING; LDL GOAL LESS THAN 160     HTN, goal below 140/90     Vaginal atrophy     S/P gastric bypass     History of cervical spinal surgery     Lesion of sciatic nerve, right     Lumbosacral radiculopathy     Cervicalgia     B12 deficiency anemia     Lumbar foraminal stenosis     Protruded cervical disc     Piriformis syndrome, right     Low serum sodium     Tobacco abuse     Severe obesity (BMI 35.0-39.9)     Past Medical History:   Diagnosis Date     Hypertension      Iron deficiency anemia 2016     Lumbar foraminal stenosis 8/2015    noted on MRI     Piriformis syndrome, right 10/10/2016    Dx by neuro     Protruded cervical disc 8/2015    C4-5 noted on MRI     Past Surgical  History:   Procedure Laterality Date     C EXCIS KNEE CARTILAGE,MEDIAL OR LAT  07/02/07    right knee     CHOLECYSTECTOMY       COLONOSCOPY  9/20/2011    Procedure:COMBINED COLONOSCOPY, REMOVE TUMOR/POLYP/LESION BY SNARE; Colonoscopy with polypectomy by snare; Surgeon:TERESO LIANG; Location: GI     COLONOSCOPY N/A 9/18/2017    Procedure: COMBINED COLONOSCOPY, SINGLE OR MULTIPLE BIOPSY/POLYPECTOMY BY BIOPSY;;  Surgeon: Cale James MD;  Location: [facility]     DAVINCI BYPASS GASTRIC JOSHUA-EN-Y  1993     DISCECTOMY CERVICAL MINIMALLY INVASIVE TWO LEVELS       FUSION CERVICAL ANTERIOR TWO LEVELS      C5-7     HC LIGMT REVISION,KNEE,INTRA-ARTIC  07/02/07     HYSTERECTOMY VAGINAL       HYSTERECTOMY, PAP NO LONGER INDICATED       TONSILLECTOMY         Social History:  Patient reports that she has been smoking cigarettes.  She has a 20.00 pack-year smoking history. She has never used smokeless tobacco. She reports that she drinks about 3.0 oz of alcohol per week. She reports that she does not use drugs. Patient is retired para at an elementary school.     Family History:  Skin cancer in mom.     Medications:  Current Outpatient Medications   Medication Sig Dispense Refill     calcium-vitamin D (CALTRATE) 600-400 MG-UNIT per tablet Take 2 tablets by mouth daily.       cholecalciferol (VITAMIN D3) 1000 UNIT capsule Take 2 capsules by mouth daily.       cyanocobalamin (VITAMIN B12) 1000 MCG/ML injection INJECT 1 ML AS DIRECTED ONCE EVERY 30 DAYS 1 mL 11     docusate sodium (COLACE) 100 MG capsule Take 1 capsule (100 mg) by mouth daily 30 capsule 1     lisinopril (PRINIVIL/ZESTRIL) 10 MG tablet Take 1 tablet (10 mg) by mouth daily Fill when requested 90 tablet 3     naproxen (NAPROSYN) 500 MG tablet Take 250 mg by mouth daily          Allergies   Allergen Reactions     Contrast Dye Hives and Itching     No Known Drug Allergy        Review of Systems:  -Constitutional: Patient is otherwise feeling well, in usual  state of health.   -Skin: As above in HPI. No additional skin concerns.    Physical exam:  Vitals: LMP  (LMP Unknown)   GEN: This is a well developed, well-nourished female in no acute distress, in a pleasant mood.   SKIN: Total skin excluding the undergarment areas was performed. The exam included the head/face, neck, both arms, chest, back, abdomen, both legs, digits and/or nails and buttocks.   - Limon Type II  - Scattered brown macules on sun exposed areas.   - Well healed linear scar on the right upper back  - There are dome shaped bright red papules on the trunk.   - Multiple regular brown pigmented macules and papules are identified on the trunk.   - There are waxy stuck on tan to brown papules on the face, trunk, and extremities (area of pt concern).  - There is a firm tan/flesh colored papule that dimples with lateral pressure on the left upper arm.  - No other lesions of concern on areas examined.       Impression/Plan:  1. History of DN, no evidence of recurrence.     Recommend sunscreens SPF #30 or greater, protective clothing and avoidance of tanning beds.    2. Sun damaged skin with solar lentigines    Recommend sunscreens SPF #30 or greater, protective clothing and avoidance of tanning beds.    3. Benign findings including: cherry angioma, seborrheic keratosis, dermatofibroma    No further intervention required. Patient to report changes.     Patient reassured of the benign nature of these lesions.    4. Multiple clinically benign nevi    No further intervention required. Patient to report changes.     Patient reassured of the benign nature of these lesions.    CC LUZ Mock CNP on close of this encounter.  Follow-up in 1 year for skin exam, sooner for lesions of concern.         Staff Involved:  Scribe/Staff    Scribe Disclosure  I, Li De Los Santos, am serving as a scribe to document services personally performed by Dr. Babita Richardson MD, based on data collection and the provider's  statements to me.     Provider Disclosure:   The documentation recorded by the scribe accurately reflects the services I personally performed and the decisions made by me.    Babita Richardson MD    Department of Dermatology  [facility]: Phone: [phone], Fax:[phone]  [facility]: Phone: [phone], Fax: [phone]                      Again, thank you for allowing me to participate in the care of your patient.        Sincerely,        Babita Richardson MD

## 2019-06-20 NOTE — NURSING NOTE
Chief Complaint   Patient presents with     Skin Check     hx of DN     She requests these members of her care team be copied on today's visit information: no    PCP: Carlie Corrales    Referring Provider:  No referring provider defined for this encounter.    LMP  (LMP Unknown)     Do you need any medication refills at today's visit? No    Desire Palacio LPN

## 2019-06-20 NOTE — PROGRESS NOTES
[facility] Dermatology Note      Dermatology Problem List:  1. DN  - Compound dysplastic nevus, moderate to severe atypia, right upper back, s/p excision 12/10/18   2. AK, s/p cryotherapy       Encounter Date: Jun 20, 2019    CC:  Chief Complaint   Patient presents with     Skin Check     hx of DN         History of Present Illness:  Ms. Kristine Cox is a 59 year old female with history of moderate to severe DN who presents as a follow up for a skin check. She was last seen by Dr. Greco 12/10/2018 for excision. She reports it is healing well but it is  at times. This seems to be gradually getting better. Today, she has a lesion of concern on her right forehead.  This is a new brown spot. It is not tender and has not bled. She denies any other specific areas of concern today. She has been very diligent with her sun protection so far this summer.     No other concerns addressed today.      Past Medical History:   Patient Active Problem List   Diagnosis     CARDIOVASCULAR SCREENING; LDL GOAL LESS THAN 160     HTN, goal below 140/90     Vaginal atrophy     S/P gastric bypass     History of cervical spinal surgery     Lesion of sciatic nerve, right     Lumbosacral radiculopathy     Cervicalgia     B12 deficiency anemia     Lumbar foraminal stenosis     Protruded cervical disc     Piriformis syndrome, right     Low serum sodium     Tobacco abuse     Severe obesity (BMI 35.0-39.9)     Past Medical History:   Diagnosis Date     Hypertension      Iron deficiency anemia 2016     Lumbar foraminal stenosis 8/2015    noted on MRI     Piriformis syndrome, right 10/10/2016    Dx by neuro     Protruded cervical disc 8/2015    C4-5 noted on MRI     Past Surgical History:   Procedure Laterality Date     C EXCIS KNEE CARTILAGE,MEDIAL OR LAT  07/02/07    right knee     CHOLECYSTECTOMY       COLONOSCOPY  9/20/2011    Procedure:COMBINED COLONOSCOPY, REMOVE TUMOR/POLYP/LESION BY SNARE; Colonoscopy with  polypectomy by snare; Surgeon:TERESO LIANG; Location: GI     COLONOSCOPY N/A 9/18/2017    Procedure: COMBINED COLONOSCOPY, SINGLE OR MULTIPLE BIOPSY/POLYPECTOMY BY BIOPSY;;  Surgeon: Cale James MD;  Location:  GI     DAVINCI BYPASS GASTRIC JOSHUA-EN-Y  1993     DISCECTOMY CERVICAL MINIMALLY INVASIVE TWO LEVELS       FUSION CERVICAL ANTERIOR TWO LEVELS      C5-7     HC LIGMT REVISION,KNEE,INTRA-ARTIC  07/02/07     HYSTERECTOMY VAGINAL       HYSTERECTOMY, PAP NO LONGER INDICATED       TONSILLECTOMY         Social History:  Patient reports that she has been smoking cigarettes.  She has a 20.00 pack-year smoking history. She has never used smokeless tobacco. She reports that she drinks about 3.0 oz of alcohol per week. She reports that she does not use drugs. Patient is retired para at an elementary school.     Family History:  Skin cancer in mom.     Medications:  Current Outpatient Medications   Medication Sig Dispense Refill     calcium-vitamin D (CALTRATE) 600-400 MG-UNIT per tablet Take 2 tablets by mouth daily.       cholecalciferol (VITAMIN D3) 1000 UNIT capsule Take 2 capsules by mouth daily.       cyanocobalamin (VITAMIN B12) 1000 MCG/ML injection INJECT 1 ML AS DIRECTED ONCE EVERY 30 DAYS 1 mL 11     docusate sodium (COLACE) 100 MG capsule Take 1 capsule (100 mg) by mouth daily 30 capsule 1     lisinopril (PRINIVIL/ZESTRIL) 10 MG tablet Take 1 tablet (10 mg) by mouth daily Fill when requested 90 tablet 3     naproxen (NAPROSYN) 500 MG tablet Take 250 mg by mouth daily          Allergies   Allergen Reactions     Contrast Dye Hives and Itching     No Known Drug Allergy        Review of Systems:  -Constitutional: Patient is otherwise feeling well, in usual state of health.   -Skin: As above in HPI. No additional skin concerns.    Physical exam:  Vitals: LMP  (LMP Unknown)   GEN: This is a well developed, well-nourished female in no acute distress, in a pleasant mood.   SKIN: Total skin  excluding the undergarment areas was performed. The exam included the head/face, neck, both arms, chest, back, abdomen, both legs, digits and/or nails and buttocks.   - Limon Type II  - Scattered brown macules on sun exposed areas.   - Well healed linear scar on the right upper back  - There are dome shaped bright red papules on the trunk.   - Multiple regular brown pigmented macules and papules are identified on the trunk.   - There are waxy stuck on tan to brown papules on the face, trunk, and extremities (area of pt concern).  - There is a firm tan/flesh colored papule that dimples with lateral pressure on the left upper arm.  - No other lesions of concern on areas examined.       Impression/Plan:  1. History of DN, no evidence of recurrence.     Recommend sunscreens SPF #30 or greater, protective clothing and avoidance of tanning beds.    2. Sun damaged skin with solar lentigines    Recommend sunscreens SPF #30 or greater, protective clothing and avoidance of tanning beds.    3. Benign findings including: cherry angioma, seborrheic keratosis, dermatofibroma    No further intervention required. Patient to report changes.     Patient reassured of the benign nature of these lesions.    4. Multiple clinically benign nevi    No further intervention required. Patient to report changes.     Patient reassured of the benign nature of these lesions.    CC LUZ Mock CNP on close of this encounter.  Follow-up in 1 year for skin exam, sooner for lesions of concern.         Staff Involved:  Scribe/Staff    Scribe Disclosure  I, Li De Los Santos, am serving as a scribe to document services personally performed by Dr. Babita Richardson MD, based on data collection and the provider's statements to me.     Provider Disclosure:   The documentation recorded by the scribe accurately reflects the services I personally performed and the decisions made by me.    Babita Richardson MD    Department of  Dermatology  [facility]: Phone: [phone], Fax:[phone]  [facility]: Phone: [phone], Fax: [phone]

## 2019-10-14 DIAGNOSIS — D51.9 ANEMIA DUE TO VITAMIN B12 DEFICIENCY, UNSPECIFIED B12 DEFICIENCY TYPE: ICD-10-CM

## 2019-10-14 DIAGNOSIS — Z98.84 S/P GASTRIC BYPASS: ICD-10-CM

## 2019-10-14 RX ORDER — CYANOCOBALAMIN 1000 UG/ML
INJECTION, SOLUTION INTRAMUSCULAR; SUBCUTANEOUS
Qty: 1 ML | Refills: 3 | Status: SHIPPED | OUTPATIENT
Start: 2019-10-14 | End: 2020-01-22

## 2019-10-14 NOTE — TELEPHONE ENCOUNTER
"Requested Prescriptions   Pending Prescriptions Disp Refills     cyanocobalamin (CYANOCOBALAMIN) 1000 MCG/ML injection [Pharmacy Med Name: CYANOCOBALAMIN 1000MCG/ML SOLN] 1 mL 11     Sig: INJECT 1ML AS DIRECTED EVERY 30 DAYS   Last Written Prescription Date:  10/3/2018  Last Fill Quantity: 1ml,  # refills: 11   Last office visit: 1/14/2019 with prescribing provider:  01/14/2019   Future Office Visit:        Vitamin Supplements (Adult) Protocol Passed - 10/14/2019 10:00 AM        Passed - High dose Vitamin D not ordered        Passed - Recent (12 mo) or future (30 days) visit within the authorizing provider's specialty     Patient has had an office visit with the authorizing provider or a provider within the authorizing providers department within the previous 12 mos or has a future within next 30 days. See \"Patient Info\" tab in inbasket, or \"Choose Columns\" in Meds & Orders section of the refill encounter.              Passed - Medication is active on med list      Prescription approved per [facility] Refill Protocol.  Nell Hilliard RN      "

## 2020-01-21 DIAGNOSIS — D51.9 ANEMIA DUE TO VITAMIN B12 DEFICIENCY, UNSPECIFIED B12 DEFICIENCY TYPE: ICD-10-CM

## 2020-01-21 DIAGNOSIS — I10 HTN, GOAL BELOW 140/90: ICD-10-CM

## 2020-01-21 DIAGNOSIS — Z98.84 S/P GASTRIC BYPASS: ICD-10-CM

## 2020-01-22 RX ORDER — LISINOPRIL 10 MG/1
TABLET ORAL
Qty: 30 TABLET | Refills: 0 | Status: SHIPPED | OUTPATIENT
Start: 2020-01-22 | End: 2020-01-29

## 2020-01-22 RX ORDER — CYANOCOBALAMIN 1000 UG/ML
INJECTION, SOLUTION INTRAMUSCULAR; SUBCUTANEOUS
Qty: 1 ML | Refills: 0 | Status: SHIPPED | OUTPATIENT
Start: 2020-01-22 | End: 2020-01-29

## 2020-01-22 NOTE — TELEPHONE ENCOUNTER
Medication is being filled for 1 time refill only due to:  Patient needs to be seen because it has been more than one year since last visit.     Patient is scheduled 1/29/2020 with PCP.     COREY RosenN, RN  [facility]

## 2020-01-22 NOTE — TELEPHONE ENCOUNTER
"Requested Prescriptions   Pending Prescriptions Disp Refills     lisinopril (PRINIVIL/ZESTRIL) 10 MG tablet [Pharmacy Med Name: LISINOPRIL 10MG TABS] 90 tablet 0     Sig: TAKE ONE TABLET BY MOUTH ONCE DAILY   Last Written Prescription Date:  10/22/19  Last Fill Quantity: 90,  # refills: 0   Last office visit: 1/14/2019 with prescribing provider:  1/14/19   Future Office Visit:   Next 5 appointments (look out 90 days)    Jan 29, 2020  8:00 AM CST  PHYSICAL with LUZ Mock CNP  [facility] [address] [facility] [address] [phone]           ACE Inhibitors (Including Combos) Protocol Failed - 1/21/2020  2:59 PM        Failed - Blood pressure under 140/90 in past 12 months     BP Readings from Last 3 Encounters:   01/17/19 130/80   01/14/19 134/82   12/10/18 134/82                 Failed - Normal serum creatinine on file in past 12 months     Recent Labs   Lab Test 10/03/18  1332   CR 0.81             Failed - Normal serum potassium on file in past 12 months     Recent Labs   Lab Test 10/03/18  1332   POTASSIUM 4.3             Passed - Recent (12 mo) or future (30 days) visit within the authorizing provider's specialty     Patient has had an office visit with the authorizing provider or a provider within the authorizing providers department within the previous 12 mos or has a future within next 30 days. See \"Patient Info\" tab in inbasket, or \"Choose Columns\" in Meds & Orders section of the refill encounter.              Passed - Medication is active on med list        Passed - Patient is age 18 or older        Passed - No active pregnancy on record        Passed - No positive pregnancy test within past 12 months        cyanocobalamin (CYANOCOBALAMIN) 1000 MCG/ML injection [Pharmacy Med Name: CYANOCOBALAMIN 1000MCG/ML SOLN] 1 mL 3     Sig: INJECT 1ML AS DIRECTED EVERY 30 DAYS   Last Written Prescription Date:  10/14/19  Last Fill Quantity: 1 mL,  # refills: 3 " "  Last office visit: 1/14/2019 with prescribing provider:  1/14/19   Future Office Visit:   Next 5 appointments (look out 90 days)    Jan 29, 2020  8:00 AM CST  PHYSICAL with LUZ Mock CNP  [facility] [address] [facility] [address]  [phone]           Vitamin Supplements (Adult) Protocol Passed - 1/21/2020  2:59 PM        Passed - High dose Vitamin D not ordered        Passed - Recent (12 mo) or future (30 days) visit within the authorizing provider's specialty     Patient has had an office visit with the authorizing provider or a provider within the authorizing providers department within the previous 12 mos or has a future within next 30 days. See \"Patient Info\" tab in inbasket, or \"Choose Columns\" in Meds & Orders section of the refill encounter.              Passed - Medication is active on med list        "

## 2020-01-29 ENCOUNTER — OFFICE VISIT (OUTPATIENT)
Dept: FAMILY MEDICINE | Facility: OTHER | Age: 60
End: 2020-01-29
Payer: COMMERCIAL

## 2020-01-29 VITALS
HEART RATE: 70 BPM | WEIGHT: 229.7 LBS | DIASTOLIC BLOOD PRESSURE: 84 MMHG | RESPIRATION RATE: 14 BRPM | SYSTOLIC BLOOD PRESSURE: 132 MMHG | TEMPERATURE: 97.3 F | OXYGEN SATURATION: 99 % | BODY MASS INDEX: 34.81 KG/M2 | HEIGHT: 68 IN

## 2020-01-29 DIAGNOSIS — R00.2 PALPITATIONS: ICD-10-CM

## 2020-01-29 DIAGNOSIS — D51.9 ANEMIA DUE TO VITAMIN B12 DEFICIENCY, UNSPECIFIED B12 DEFICIENCY TYPE: ICD-10-CM

## 2020-01-29 DIAGNOSIS — I10 HTN, GOAL BELOW 140/90: ICD-10-CM

## 2020-01-29 DIAGNOSIS — Z12.31 ENCOUNTER FOR SCREENING MAMMOGRAM FOR BREAST CANCER: ICD-10-CM

## 2020-01-29 DIAGNOSIS — Z00.00 ROUTINE GENERAL MEDICAL EXAMINATION AT A HEALTH CARE FACILITY: Primary | ICD-10-CM

## 2020-01-29 DIAGNOSIS — Z98.84 S/P GASTRIC BYPASS: ICD-10-CM

## 2020-01-29 LAB
ANION GAP SERPL CALCULATED.3IONS-SCNC: 5 MMOL/L (ref 3–14)
BUN SERPL-MCNC: 9 MG/DL (ref 7–30)
CALCIUM SERPL-MCNC: 9.4 MG/DL (ref 8.5–10.1)
CHLORIDE SERPL-SCNC: 101 MMOL/L (ref 94–109)
CO2 SERPL-SCNC: 29 MMOL/L (ref 20–32)
CREAT SERPL-MCNC: 0.85 MG/DL (ref 0.52–1.04)
CREAT UR-MCNC: 82 MG/DL
DEPRECATED CALCIDIOL+CALCIFEROL SERPL-MC: 37 UG/L (ref 20–75)
GFR SERPL CREATININE-BSD FRML MDRD: 75 ML/MIN/{1.73_M2}
GLUCOSE SERPL-MCNC: 95 MG/DL (ref 70–99)
MICROALBUMIN UR-MCNC: <5 MG/L
MICROALBUMIN/CREAT UR: NORMAL MG/G CR (ref 0–25)
POTASSIUM SERPL-SCNC: 4.4 MMOL/L (ref 3.4–5.3)
SODIUM SERPL-SCNC: 135 MMOL/L (ref 133–144)
VIT B12 SERPL-MCNC: 484 PG/ML (ref 193–986)

## 2020-01-29 PROCEDURE — 36415 COLL VENOUS BLD VENIPUNCTURE: CPT | Performed by: NURSE PRACTITIONER

## 2020-01-29 PROCEDURE — 82043 UR ALBUMIN QUANTITATIVE: CPT | Performed by: NURSE PRACTITIONER

## 2020-01-29 PROCEDURE — 82306 VITAMIN D 25 HYDROXY: CPT | Performed by: NURSE PRACTITIONER

## 2020-01-29 PROCEDURE — 99396 PREV VISIT EST AGE 40-64: CPT | Performed by: NURSE PRACTITIONER

## 2020-01-29 PROCEDURE — 82607 VITAMIN B-12: CPT | Performed by: NURSE PRACTITIONER

## 2020-01-29 PROCEDURE — 99214 OFFICE O/P EST MOD 30 MIN: CPT | Mod: 25 | Performed by: NURSE PRACTITIONER

## 2020-01-29 PROCEDURE — 80048 BASIC METABOLIC PNL TOTAL CA: CPT | Performed by: NURSE PRACTITIONER

## 2020-01-29 RX ORDER — LISINOPRIL 10 MG/1
10 TABLET ORAL DAILY
Qty: 90 TABLET | Refills: 3 | Status: SHIPPED | OUTPATIENT
Start: 2020-01-29 | End: 2020-11-23

## 2020-01-29 RX ORDER — CYANOCOBALAMIN 1000 UG/ML
INJECTION, SOLUTION INTRAMUSCULAR; SUBCUTANEOUS
Qty: 3 ML | Refills: 3 | Status: SHIPPED | OUTPATIENT
Start: 2020-01-29 | End: 2020-11-23

## 2020-01-29 ASSESSMENT — ENCOUNTER SYMPTOMS
ABDOMINAL PAIN: 0
FEVER: 0
DIZZINESS: 0
HEADACHES: 0
PARESTHESIAS: 0
MYALGIAS: 0
SHORTNESS OF BREATH: 1
JOINT SWELLING: 0
SORE THROAT: 0
WEAKNESS: 0
CONSTIPATION: 0
EYE PAIN: 0
HEMATOCHEZIA: 0
BREAST MASS: 0
HEARTBURN: 1
FREQUENCY: 0
PALPITATIONS: 1
NERVOUS/ANXIOUS: 1
DYSURIA: 0
CHILLS: 0
NAUSEA: 0
COUGH: 0
DIARRHEA: 0
HEMATURIA: 0
ARTHRALGIAS: 0

## 2020-01-29 ASSESSMENT — PATIENT HEALTH QUESTIONNAIRE - PHQ9
SUM OF ALL RESPONSES TO PHQ QUESTIONS 1-9: 6
SUM OF ALL RESPONSES TO PHQ QUESTIONS 1-9: 6
10. IF YOU CHECKED OFF ANY PROBLEMS, HOW DIFFICULT HAVE THESE PROBLEMS MADE IT FOR YOU TO DO YOUR WORK, TAKE CARE OF THINGS AT HOME, OR GET ALONG WITH OTHER PEOPLE: SOMEWHAT DIFFICULT

## 2020-01-29 ASSESSMENT — PAIN SCALES - GENERAL: PAINLEVEL: NO PAIN (0)

## 2020-01-29 ASSESSMENT — MIFFLIN-ST. JEOR: SCORE: 1657.79

## 2020-01-29 NOTE — PATIENT INSTRUCTIONS
Labs will be done today.   For normal results, you will receive a letter with the results in about 2 weeks.  If anything is abnormal or unexpected, someone from the clinic will call you.      See the cardiologist after you wear the heart monitor for 48 hours.     Schedule your mammogram    Preventive Health Recommendations  Female Ages 50 - 64    Yearly exam: See your health care provider every year in order to  o Review health changes.   o Discuss preventive care.    o Review your medicines if your doctor has prescribed any.      Get a Pap test every three years (unless you have an abnormal result and your provider advises testing more often).    If you get Pap tests with HPV test, you only need to test every 5 years, unless you have an abnormal result.     You do not need a Pap test if your uterus was removed (hysterectomy) and you have not had cancer.    You should be tested each year for STDs (sexually transmitted diseases) if you're at risk.     Have a mammogram every 1 to 2 years.    Have a colonoscopy at age 50, or have a yearly FIT test (stool test). These exams screen for colon cancer.      Have a cholesterol test every 5 years, or more often if advised.    Have a diabetes test (fasting glucose) every three years. If you are at risk for diabetes, you should have this test more often.     If you are at risk for osteoporosis (brittle bone disease), think about having a bone density scan (DEXA).    Shots: Get a flu shot each year. Get a tetanus shot every 10 years.    Nutrition:     Eat at least 5 servings of fruits and vegetables each day.    Eat whole-grain bread, whole-wheat pasta and brown rice instead of white grains and rice.    Get adequate Calcium and Vitamin D.     Lifestyle    Exercise at least 150 minutes a week (30 minutes a day, 5 days a week). This will help you control your weight and prevent disease.    Limit alcohol to one drink per day.    No smoking.     Wear sunscreen to prevent skin  cancer.     See your dentist every six months for an exam and cleaning.    See your eye doctor every 1 to 2 years.

## 2020-01-29 NOTE — LETTER
January 30, 2020      Kristine Cox  [address] [place] [address]        Dear ,    We are writing to inform you of your test results.    Your results were all normal or expected.  Please continue your current plan of care.     Resulted Orders   BASIC METABOLIC PANEL   Result Value Ref Range    Sodium 135 133 - 144 mmol/L    Potassium 4.4 3.4 - 5.3 mmol/L    Chloride 101 94 - 109 mmol/L    Carbon Dioxide 29 20 - 32 mmol/L    Anion Gap 5 3 - 14 mmol/L    Glucose 95 70 - 99 mg/dL    Urea Nitrogen 9 7 - 30 mg/dL    Creatinine 0.85 0.52 - 1.04 mg/dL    GFR Estimate 75 >60 mL/min/[1.73_m2]      Comment:      Non  GFR Calc  Starting 12/18/2018, serum creatinine based estimated GFR (eGFR) will be   calculated using the Chronic Kidney Disease Epidemiology Collaboration   (CKD-EPI) equation.      GFR Estimate If Black 87 >60 mL/min/[1.73_m2]      Comment:       GFR Calc  Starting 12/18/2018, serum creatinine based estimated GFR (eGFR) will be   calculated using the Chronic Kidney Disease Epidemiology Collaboration   (CKD-EPI) equation.      Calcium 9.4 8.5 - 10.1 mg/dL   Albumin Random Urine Quantitative with Creat Ratio   Result Value Ref Range    Creatinine Urine 82 mg/dL    Albumin Urine mg/L <5 mg/L    Albumin Urine mg/g Cr Unable to calculate due to low value 0 - 25 mg/g Cr   Vitamin B12   Result Value Ref Range    Vitamin B12 484 193 - 986 pg/mL   Vitamin D Deficiency   Result Value Ref Range    Vitamin D Deficiency screening 37 20 - 75 ug/L      Comment:      Season, race, dietary intake, and treatment affect the concentration of   25-hydroxy-Vitamin D. Values may decrease during winter months and increase   during summer months. Values 20-29 ug/L may indicate Vitamin D insufficiency   and values <20 ug/L may indicate Vitamin D deficiency.  Vitamin D determination is routinely performed by an immunoassay specific for   25 hydroxyvitamin D3.  If an individual is on vitamin D2  (ergocalciferol)   supplementation, please specify 25 OH vitamin D2 and D3 level determination by   LCMSMS test [id number].         If you have any questions or concerns, please call the clinic at the number listed above.       Sincerely,        LUZ Mock CNP

## 2020-01-29 NOTE — PROGRESS NOTES
SUBJECTIVE:   CC: Kristine Cox is an 59 year old woman who presents for preventive health visit.     Healthy Habits:     Getting at least 3 servings of Calcium per day:  NO    Bi-annual eye exam:  Yes    Dental care twice a year:  Yes    Sleep apnea or symptoms of sleep apnea:  None    Diet:  Regular (no restrictions)    Frequency of exercise:  None    Taking medications regularly:  Yes    Medication side effects:  Not applicable    PHQ-2 Total Score: 4    Additional concerns today:  Yes    She has palpitations.  Has had these for at least 30 years, but they are worsening.  Last wore a cardiac monitor in her 20's.  No chest pain or dyspnea.  Is requesting to see cardiology about this.  Is not on any medications for this.     Today's PHQ-2 Score:   PHQ-2 ( 1999 [employer]) 1/29/2020   Q1: Little interest or pleasure in doing things 2   Q2: Feeling down, depressed or hopeless 2   PHQ-2 Score 4   Q1: Little interest or pleasure in doing things More than half the days   Q2: Feeling down, depressed or hopeless More than half the days   PHQ-2 Score 4       Abuse: Current or Past(Physical, Sexual or Emotional)- No  Do you feel safe in your environment? Yes    Have you ever done Advance Care Planning? (For example, a Health Directive, POLST, or a discussion with a medical provider or your loved ones about your wishes): No, advance care planning information given to patient to review.  Patient declined advance care planning discussion at this time.    Social History     Tobacco Use     Smoking status: Current Every Day Smoker     Packs/day: 0.50     Years: 40.00     Pack years: 20.00     Types: Cigarettes     Smokeless tobacco: Never Used   Substance Use Topics     Alcohol use: Yes     Alcohol/week: 5.0 standard drinks     Types: 5 Glasses of wine per week     Comment: 3-4 per week 2-3 drinks      If you drink alcohol do you typically have >3 drinks per day or >7 drinks per week? No    Alcohol Use 1/29/2020   Prescreen: >3  "drinks/day or >7 drinks/week? Yes   Prescreen: >3 drinks/day or >7 drinks/week? -   AUDIT SCORE  11       Reviewed orders with patient.  Reviewed health maintenance and updated orders accordingly - Yes  Lab work is in process    Mammogram Screening: Patient over age 50, mutual decision to screen reflected in health maintenance.    Pertinent mammograms are reviewed under the imaging tab.  History of abnormal Pap smear: NO - age 30-65 PAP every 5 years with negative HPV co-testing recommended     Reviewed and updated as needed this visit by clinical staff  Tobacco  Allergies  Meds  Med Hx  Surg Hx  Fam Hx  Soc Hx        Reviewed and updated as needed this visit by Provider            Review of Systems   Constitutional: Negative for chills and fever.   HENT: Negative for congestion, ear pain, hearing loss and sore throat.    Eyes: Negative for pain and visual disturbance.   Respiratory: Positive for shortness of breath. Negative for cough.    Cardiovascular: Positive for palpitations. Negative for chest pain and peripheral edema.   Gastrointestinal: Positive for heartburn. Negative for abdominal pain, constipation, diarrhea, hematochezia and nausea.   Breasts:  Negative for tenderness and breast mass.   Genitourinary: Negative for dysuria, frequency, genital sores, hematuria, pelvic pain, urgency, vaginal bleeding and vaginal discharge.   Musculoskeletal: Negative for arthralgias, joint swelling and myalgias.   Skin: Negative for rash.   Neurological: Negative for dizziness, weakness, headaches and paresthesias.   Psychiatric/Behavioral: Negative for mood changes. The patient is nervous/anxious.           OBJECTIVE:   /84 (BP Location: Left arm, Patient Position: Sitting, Cuff Size: Adult Regular)   Pulse 70   Temp 97.3  F (36.3  C) (Temporal)   Resp 14   Ht 1.715 m (5' 7.52\")   Wt 104.2 kg (229 lb 11.2 oz)   LMP  (LMP Unknown)   SpO2 99%   Breastfeeding No   BMI 35.42 kg/m    Physical " Exam  GENERAL APPEARANCE: alert, no distress and obese  EYES: Eyes grossly normal to inspection, PERRL and conjunctivae and sclerae normal  HENT: ear canals and TM's normal, nose and mouth without ulcers or lesions, oropharynx clear and oral mucous membranes moist  NECK: no adenopathy, no asymmetry, masses, or scars and thyroid normal to palpation  RESP: lungs clear to auscultation - no rales, rhonchi or wheezes  CV: normal S1 S2, no S3 or S4, no murmur, click or rub and occasional early beat noted.   ABDOMEN: soft, nontender, no hepatosplenomegaly, no masses and bowel sounds normal  MS: no musculoskeletal defects are noted and gait is age appropriate without ataxia  SKIN: no suspicious lesions or rashes  NEURO: Normal strength and tone, sensory exam grossly normal, mentation intact and speech normal  PSYCH: mentation appears normal and affect normal/bright    Diagnostic Test Results:  Pending     ASSESSMENT/PLAN:   1. Routine general medical examination at a health care facility    2. HTN, goal below 140/90  Chronic, controlled.  No change in treatment plan.   - BASIC METABOLIC PANEL  - Albumin Random Urine Quantitative with Creat Ratio  - lisinopril (PRINIVIL/ZESTRIL) 10 MG tablet; Take 1 tablet (10 mg) by mouth daily  Dispense: 90 tablet; Refill: 3    3. Anemia due to vitamin B12 deficiency, unspecified B12 deficiency type  Chronic, controlled.  No change in treatment plan.   - cyanocobalamin (CYANOCOBALAMIN) 1000 MCG/ML injection; INJECT 1ML AS DIRECTED EVERY 30 DAYS  Dispense: 3 mL; Refill: 3  - Vitamin B12    4. S/P gastric bypass  - cyanocobalamin (CYANOCOBALAMIN) 1000 MCG/ML injection; INJECT 1ML AS DIRECTED EVERY 30 DAYS  Dispense: 3 mL; Refill: 3    5. Palpitations  Will get her set up with a 48 hour monitor and get her into see Cardiology after that.   - BASIC METABOLIC PANEL  - Vitamin D Deficiency  - Holter Monitor 48 hour Adult Pediatric; Future  - CARDIOLOGY EVAL ADULT REFERRAL; Future    6. Encounter  "for screening mammogram for breast cancer  - *MA Screening Digital Bilateral; Future    COUNSELING:  Reviewed preventive health counseling, as reflected in patient instructions    Estimated body mass index is 35.42 kg/m  as calculated from the following:    Height as of this encounter: 1.715 m (5' 7.52\").    Weight as of this encounter: 104.2 kg (229 lb 11.2 oz).    Weight management plan: Discussed healthy diet and exercise guidelines     reports that she has been smoking cigarettes. She has a 20.00 pack-year smoking history. She has never used smokeless tobacco.  Tobacco Cessation Action Plan: Information offered: Patient not interested at this time    Counseling Resources:  ATP IV Guidelines  Pooled Cohorts Equation Calculator  Breast Cancer Risk Calculator  FRAX Risk Assessment  ICSI Preventive Guidelines  Dietary Guidelines for Americans, 2010  USDA's MyPlate  ASA Prophylaxis  Lung CA Screening    In addition to the preventive visit, 25  minutes of the appointment were spent evaluating and developing a treatment plan for her additional concern(s).          LUZ [facility]  Answers for HPI/ROS submitted by the patient on 1/29/2020   Annual Exam:  If you checked off any problems, how difficult have these problems made it for you to do your work, take care of things at home, or get along with other people?: Somewhat difficult  PHQ9 TOTAL SCORE: 6    "

## 2020-01-30 ASSESSMENT — PATIENT HEALTH QUESTIONNAIRE - PHQ9: SUM OF ALL RESPONSES TO PHQ QUESTIONS 1-9: 6

## 2020-01-30 NOTE — RESULT ENCOUNTER NOTE
Please notify patient, call or letter    Your results were all normal or expected.  Please continue your current plan of care.     Electronically signed by Carlie Corrales CNP.

## 2020-01-31 ENCOUNTER — HOSPITAL ENCOUNTER (OUTPATIENT)
Dept: CARDIOLOGY | Facility: CLINIC | Age: 60
Discharge: HOME | End: 2020-01-31
Attending: NURSE PRACTITIONER | Admitting: NURSE PRACTITIONER
Payer: COMMERCIAL

## 2020-01-31 DIAGNOSIS — R00.2 PALPITATIONS: ICD-10-CM

## 2020-01-31 PROCEDURE — 93226 XTRNL ECG REC<48 HR SCAN A/R: CPT

## 2020-01-31 PROCEDURE — 93227 XTRNL ECG REC<48 HR R&I: CPT | Performed by: INTERNAL MEDICINE

## 2020-02-11 ENCOUNTER — ANCILLARY PROCEDURE (OUTPATIENT)
Dept: MAMMOGRAPHY | Facility: OTHER | Age: 60
End: 2020-02-11
Attending: NURSE PRACTITIONER
Payer: COMMERCIAL

## 2020-02-11 DIAGNOSIS — Z12.31 ENCOUNTER FOR SCREENING MAMMOGRAM FOR BREAST CANCER: ICD-10-CM

## 2020-02-11 PROCEDURE — 77063 BREAST TOMOSYNTHESIS BI: CPT | Mod: TC

## 2020-02-11 PROCEDURE — 77067 SCR MAMMO BI INCL CAD: CPT | Mod: TC

## 2020-02-19 ENCOUNTER — HOSPITAL ENCOUNTER (OUTPATIENT)
Dept: CARDIOLOGY | Facility: CLINIC | Age: 60
Discharge: HOME | End: 2020-02-19
Attending: INTERNAL MEDICINE | Admitting: INTERNAL MEDICINE
Payer: COMMERCIAL

## 2020-02-19 ENCOUNTER — OFFICE VISIT (OUTPATIENT)
Dept: CARDIOLOGY | Facility: CLINIC | Age: 60
End: 2020-02-19
Attending: NURSE PRACTITIONER
Payer: COMMERCIAL

## 2020-02-19 VITALS
HEIGHT: 68 IN | BODY MASS INDEX: 35.01 KG/M2 | DIASTOLIC BLOOD PRESSURE: 78 MMHG | WEIGHT: 231 LBS | SYSTOLIC BLOOD PRESSURE: 120 MMHG | HEART RATE: 84 BPM | OXYGEN SATURATION: 97 %

## 2020-02-19 DIAGNOSIS — R00.2 PALPITATIONS: ICD-10-CM

## 2020-02-19 PROCEDURE — 99204 OFFICE O/P NEW MOD 45 MIN: CPT | Performed by: INTERNAL MEDICINE

## 2020-02-19 PROCEDURE — 93005 ELECTROCARDIOGRAM TRACING: CPT | Performed by: REHABILITATION PRACTITIONER

## 2020-02-19 PROCEDURE — 93010 ELECTROCARDIOGRAM REPORT: CPT | Performed by: INTERNAL MEDICINE

## 2020-02-19 RX ORDER — METOPROLOL TARTRATE 25 MG/1
25 TABLET, FILM COATED ORAL 2 TIMES DAILY
Qty: 60 TABLET | Refills: 1 | Status: SHIPPED | OUTPATIENT
Start: 2020-02-19 | End: 2021-06-07

## 2020-02-19 ASSESSMENT — MIFFLIN-ST. JEOR: SCORE: 1663.69

## 2020-02-19 NOTE — LETTER
2/19/2020    Carlie Corrales, APRN CNP  [address] [facility] [address]    RE: Kristine Cox       Dear Colleague,    I had the pleasure of seeing Kristine Cox in the [facility].    HPI and Plan:   See dictation((#[id number])    Orders Placed This Encounter   Procedures     Follow-Up with Cardiac Advanced Practice Provider     EKG 12-LEAD CLINIC READ     Echocardiogram Complete       Orders Placed This Encounter   Medications     metoprolol tartrate 25 MG PO tablet     Sig: Take 1 tablet (25 mg) by mouth 2 times daily     Dispense:  60 tablet     Refill:  1       There are no discontinued medications.      Encounter Diagnosis   Name Primary?     Palpitations        CURRENT MEDICATIONS:  Current Outpatient Medications   Medication Sig Dispense Refill     calcium-vitamin D (CALTRATE) 600-400 MG-UNIT per tablet Take 2 tablets by mouth daily.       cholecalciferol (VITAMIN D3) 1000 UNIT capsule Take 2 capsules by mouth daily.       cyanocobalamin (CYANOCOBALAMIN) 1000 MCG/ML injection INJECT 1ML AS DIRECTED EVERY 30 DAYS 3 mL 3     docusate sodium (COLACE) 100 MG capsule Take 1 capsule (100 mg) by mouth daily 30 capsule 1     lisinopril (PRINIVIL/ZESTRIL) 10 MG tablet Take 1 tablet (10 mg) by mouth daily 90 tablet 3     metoprolol tartrate 25 MG PO tablet Take 1 tablet (25 mg) by mouth 2 times daily 60 tablet 1     naproxen (NAPROSYN) 500 MG tablet Take 250 mg by mouth daily          ALLERGIES     Allergies   Allergen Reactions     Contrast Dye Hives and Itching     No Known Drug Allergy        PAST MEDICAL HISTORY:  Past Medical History:   Diagnosis Date     Hypertension      Iron deficiency anemia 2016     Lumbar foraminal stenosis 8/2015    noted on MRI     Piriformis syndrome, right 10/10/2016    Dx by neuro     Protruded cervical disc 8/2015    C4-5 noted on MRI       PAST SURGICAL HISTORY:  Past Surgical History:   Procedure Laterality Date     C EXCIS KNEE CARTILAGE,MEDIAL OR LAT   07/02/07    right knee     CHOLECYSTECTOMY       COLONOSCOPY  9/20/2011    Procedure:COMBINED COLONOSCOPY, REMOVE TUMOR/POLYP/LESION BY SNARE; Colonoscopy with polypectomy by snare; Surgeon:TERESO LIANG; Location:[facility]     COLONOSCOPY N/A 9/18/2017    Procedure: COMBINED COLONOSCOPY, SINGLE OR MULTIPLE BIOPSY/POLYPECTOMY BY BIOPSY;;  Surgeon: Cale James MD;  Location: [facility]     DAVINCI BYPASS GASTRIC JOSHUA-EN-Y  1993     DISCECTOMY CERVICAL MINIMALLY INVASIVE TWO LEVELS       FUSION CERVICAL ANTERIOR TWO LEVELS      C5-7     HC LIGMT REVISION,KNEE,INTRA-ARTIC  07/02/07     HYSTERECTOMY VAGINAL       HYSTERECTOMY, PAP NO LONGER INDICATED       TONSILLECTOMY         FAMILY HISTORY:  Family History   Problem Relation Age of Onset     Heart Disease Father         heart murmur and CHF     Hypertension Brother      Heart Disease Brother         stents     Thyroid Disease Mother         hypothyroid     Diabetes Mother      Skin Cancer Mother      Hypertension Sister        SOCIAL HISTORY:  Social History     Socioeconomic History     Marital status:      Spouse name: Not on file     Number of children: Not on file     Years of education: Not on file     Highest education level: Not on file   Occupational History     Not on file   Social Needs     Financial resource strain: Not on file     Food insecurity:     Worry: Not on file     Inability: Not on file     Transportation needs:     Medical: Not on file     Non-medical: Not on file   Tobacco Use     Smoking status: Current Every Day Smoker     Packs/day: 0.50     Years: 40.00     Pack years: 20.00     Types: Cigarettes     Smokeless tobacco: Never Used   Substance and Sexual Activity     Alcohol use: Yes     Alcohol/week: 5.0 standard drinks     Types: 5 Glasses of wine per week     Comment: 3-4 per week 2-3 drinks      Drug use: No     Sexual activity: Yes     Partners: Male     Birth control/protection: Surgical   Lifestyle     Physical  "activity:     Days per week: Not on file     Minutes per session: Not on file     Stress: Not on file   Relationships     Social connections:     Talks on phone: Not on file     Gets together: Not on file     Attends [employer] service: Not on file     Active member of club or organization: Not on file     Attends meetings of clubs or organizations: Not on file     Relationship status: Not on file     Intimate partner violence:     Fear of current or ex partner: Not on file     Emotionally abused: Not on file     Physically abused: Not on file     Forced sexual activity: Not on file   Other Topics Concern     Parent/sibling w/ CABG, MI or angioplasty before 65F 55M? Not Asked   Social History Narrative     Not on file       Review of Systems:  Skin:  Negative       Eyes:  Positive for glasses    ENT:  Negative      Respiratory:  Positive for shortness of breath     Cardiovascular:  Negative for;chest pain;edema Positive for;palpitations;lightheadedness;dizziness;fatigue;syncope or near-syncope    Gastroenterology: Negative      Genitourinary:  Negative      Musculoskeletal:  Negative      Neurologic:  Negative      Psychiatric:  Negative      Heme/Lymph/Imm:  Positive for allergies    Endocrine:  Negative        Physical Exam:  Vitals: /78 (BP Location: Right arm, Patient Position: Fowlers, Cuff Size: Adult Large)   Pulse 84   Ht 1.715 m (5' 7.52\")   Wt 104.8 kg (231 lb)   LMP  (LMP Unknown)   SpO2 97%   BMI 35.62 kg/m       Constitutional:           Skin:             Head:           Eyes:           Lymph:      ENT:           Neck:           Respiratory:            Cardiac:                                                           GI:           Extremities and Muscular Skeletal:                 Neurological:           Psych:           CC  Carlie Corrales, APRN CNP  [address] [place] [address]                Thank you for allowing me to participate in the care of your patient.      Sincerely, "     Toi Giles MD     [facility]    cc:   Carlie Corrales, LUZ CNP  [address] [place] [id number]

## 2020-02-19 NOTE — PROGRESS NOTES
Service Date: 02/19/2020      OFFICE PROGRESS NOTE       REASON FOR CARDIOLOGY VISIT:  Palpitations.      HISTORY OF PRESENT ILLNESS:  Ms. Powell is a very pleasant 59-year-old female.  She is establishing cardiology care with us.  I also see patient's  in the clinic.  The patient is coming here for evaluation of palpitations.  The patient tells me that she had palpitations since age 20.  It has become worse recently.  She can feel them every day.  Most of the time, they feel like 1 single skipped beat sensation.  At times, she can feel about 10-15 beat run of skipped beats but never anything longer than that.  She appropriately had a Holter evaluation done through her primary care physician's office that revealed PACs.  Overall, out of 229,000 beats, around 9500 beats were PACs.  Most of them were isolated PACs.  The longest run was 12 beats at around 181 beats per minute.  No PVCs were noted.  The patient denies any dizziness, presyncope, syncope or any chest discomfort or shortness of breath.  She does use tobacco, up to about 5-6 cigarettes a day.  In the past, she has quit for more than a year.  She does not use any alcohol or any significant amount of caffeine.  She had an EKG done today that was personally reviewed by me.  Shows sinus rhythm with incomplete right bundle branch block.      The patient does have history of hypertension, is on lisinopril.      PHYSICAL EXAMINATION:   VITAL SIGNS:  Blood pressure 120/78, heart rate 84 and regular, weight 231 pounds, BMI 35.62.   GENERAL:  The patient appears pleasant, comfortable.   NECK:  Normal JVP, no bruit.   CARDIOVASCULAR SYSTEM:  S1, S2 normal, no murmur, rub or gallop.   RESPIRATORY SYSTEM:  Clear to auscultation bilaterally.   GASTROINTESTINAL SYSTEM:  Abdomen soft, nontender.   EXTREMITIES:  No pitting pedal edema.   NEUROLOGICAL:  Alert, oriented x3.   PSYCHIATRIC:  Normal affect.   SKIN:  No obvious rash.   HEENT:  No pallor or icterus.       RADIOLOGIC STUDIES:  EKG as noted above.      IMPRESSION AND PLAN:  A pleasant 59-year-old female with symptoms of palpitation due to PACs and some short duration runs of PACs, SVT.  I had a long discussion with the patient regarding the nature of her symptoms.  Often, in the absence of any structural heart disease, PACs PVCs are usually benign.  I do recommend checking an echocardiogram.  We discussed option of using beta blocker to see if this can suppress her PACs.  Common side effects of beta-blocker were discussed with the patient.  We will start with 25 mg b.i.d. of metoprolol tartrate.  I am recommending a followup in about a month, either with me or with an JACOBO and we can go over whether beta blocker has helped her symptoms and also echocardiogram.  I did  her about complete tobacco cessation.         FUENTES YORK MD             D: 2020   T: 2020   MT: JENNIE      Name:     NELL RUBIO   MRN:      [id number]        Account:      [id number]   :      1960           Service Date: 2020      Document: [id number]

## 2020-02-19 NOTE — LETTER
2/19/2020      Carlie Corrales, APRN CNP  [address] [facility] [address]      RE: Kristine POPE Kenny       Dear Colleague,    I had the pleasure of seeing Kristine Cox in the [facility].    Service Date: 02/19/2020      OFFICE PROGRESS NOTE       REASON FOR CARDIOLOGY VISIT:  Palpitations.      HISTORY OF PRESENT ILLNESS:  Ms. Powell is a very pleasant 59-year-old female.  She is establishing cardiology care with us.  I also see patient's  in the clinic.  The patient is coming here for evaluation of palpitations.  The patient tells me that she had palpitations since age 20.  It has become worse recently.  She can feel them every day.  Most of the time, they feel like 1 single skipped beat sensation.  At times, she can feel about 10-15 beat run of skipped beats but never anything longer than that.  She appropriately had a Holter evaluation done through her primary care physician's office that revealed PACs.  Overall, out of 229,000 beats, around 9500 beats were PACs.  Most of them were isolated PACs.  The longest run was 12 beats at around 181 beats per minute.  No PVCs were noted.  The patient denies any dizziness, presyncope, syncope or any chest discomfort or shortness of breath.  She does use tobacco, up to about 5-6 cigarettes a day.  In the past, she has quit for more than a year.  She does not use any alcohol or any significant amount of caffeine.  She had an EKG done today that was personally reviewed by me.  Shows sinus rhythm with incomplete right bundle branch block.      The patient does have history of hypertension, is on lisinopril.      PHYSICAL EXAMINATION:   VITAL SIGNS:  Blood pressure 120/78, heart rate 84 and regular, weight 231 pounds, BMI 35.62.   GENERAL:  The patient appears pleasant, comfortable.   NECK:  Normal JVP, no bruit.   CARDIOVASCULAR SYSTEM:  S1, S2 normal, no murmur, rub or gallop.   RESPIRATORY SYSTEM:  Clear to auscultation bilaterally.    GASTROINTESTINAL SYSTEM:  Abdomen soft, nontender.   EXTREMITIES:  No pitting pedal edema.   NEUROLOGICAL:  Alert, oriented x3.   PSYCHIATRIC:  Normal affect.   SKIN:  No obvious rash.   HEENT:  No pallor or icterus.      RADIOLOGIC STUDIES:  EKG as noted above.      IMPRESSION AND PLAN:  A pleasant 59-year-old female with symptoms of palpitation due to PACs and some short duration runs of PACs, SVT.  I had a long discussion with the patient regarding the nature of her symptoms.  Often, in the absence of any structural heart disease, PACs *** are usually benign.  I do recommend checking an echocardiogram.  We discussed option of using beta blocker to see if this can suppress her PACs.  Common side effects of beta-blocker were discussed with the patient.  We will start with 25 mg b.i.d. of metoprolol tartrate.  I am recommending a followup in about a month, either with me or with an JACOBO and we can go over whether beta blocker has helped her symptoms and also echocardiogram.  I did  her about complete tobacco cessation.         FUENTES YORK MD             D: 2020   T: 2020   MT: JENNIE      Name:     NELL RUBIO   MRN:      [id number]        Account:      [id number]   :      1960           Service Date: 2020      Document: [id number]         Outpatient Encounter Medications as of 2020   Medication Sig Dispense Refill     calcium-vitamin D (CALTRATE) 600-400 MG-UNIT per tablet Take 2 tablets by mouth daily.       cholecalciferol (VITAMIN D3) 1000 UNIT capsule Take 2 capsules by mouth daily.       cyanocobalamin (CYANOCOBALAMIN) 1000 MCG/ML injection INJECT 1ML AS DIRECTED EVERY 30 DAYS 3 mL 3     docusate sodium (COLACE) 100 MG capsule Take 1 capsule (100 mg) by mouth daily 30 capsule 1     lisinopril (PRINIVIL/ZESTRIL) 10 MG tablet Take 1 tablet (10 mg) by mouth daily 90 tablet 3     metoprolol tartrate 25 MG PO tablet Take 1 tablet (25 mg) by mouth 2 times daily 60 tablet 1      naproxen (NAPROSYN) 500 MG tablet Take 250 mg by mouth daily        No facility-administered encounter medications on file as of 2/19/2020.      Again, thank you for allowing me to participate in the care of your patient.      Sincerely,    Toi Giles MD     [facility]

## 2020-02-19 NOTE — LETTER
2/19/2020      Carlie Corrales, APRN CNP  [address] [facility] [address]      RE: Kristine POPE Kenny       Dear Colleague,    I had the pleasure of seeing Kristine Cox in the [facility].    Service Date: 02/19/2020      OFFICE PROGRESS NOTE       REASON FOR CARDIOLOGY VISIT:  Palpitations.      HISTORY OF PRESENT ILLNESS:  Ms. Powell is a very pleasant 59-year-old female.  She is establishing cardiology care with us.  I also see patient's  in the clinic.  The patient is coming here for evaluation of palpitations.  The patient tells me that she had palpitations since age 20.  It has become worse recently.  She can feel them every day.  Most of the time, they feel like 1 single skipped beat sensation.  At times, she can feel about 10-15 beat run of skipped beats but never anything longer than that.  She appropriately had a Holter evaluation done through her primary care physician's office that revealed PACs.  Overall, out of 229,000 beats, around 9500 beats were PACs.  Most of them were isolated PACs.  The longest run was 12 beats at around 181 beats per minute.  No PVCs were noted.  The patient denies any dizziness, presyncope, syncope or any chest discomfort or shortness of breath.  She does use tobacco, up to about 5-6 cigarettes a day.  In the past, she has quit for more than a year.  She does not use any alcohol or any significant amount of caffeine.  She had an EKG done today that was personally reviewed by me.  Shows sinus rhythm with incomplete right bundle branch block.      The patient does have history of hypertension, is on lisinopril.      PHYSICAL EXAMINATION:   VITAL SIGNS:  Blood pressure 120/78, heart rate 84 and regular, weight 231 pounds, BMI 35.62.   GENERAL:  The patient appears pleasant, comfortable.   NECK:  Normal JVP, no bruit.   CARDIOVASCULAR SYSTEM:  S1, S2 normal, no murmur, rub or gallop.   RESPIRATORY SYSTEM:  Clear to auscultation bilaterally.    GASTROINTESTINAL SYSTEM:  Abdomen soft, nontender.   EXTREMITIES:  No pitting pedal edema.   NEUROLOGICAL:  Alert, oriented x3.   PSYCHIATRIC:  Normal affect.   SKIN:  No obvious rash.   HEENT:  No pallor or icterus.      RADIOLOGIC STUDIES:  EKG as noted above.      IMPRESSION AND PLAN:  A pleasant 59-year-old female with symptoms of palpitation due to PACs and some short duration runs of PACs, SVT.  I had a long discussion with the patient regarding the nature of her symptoms.  Often, in the absence of any structural heart disease, PACs *** are usually benign.  I do recommend checking an echocardiogram.  We discussed option of using beta blocker to see if this can suppress her PACs.  Common side effects of beta-blocker were discussed with the patient.  We will start with 25 mg b.i.d. of metoprolol tartrate.  I am recommending a followup in about a month, either with me or with an JACOBO and we can go over whether beta blocker has helped her symptoms and also echocardiogram.  I did  her about complete tobacco cessation.         FUENTES YORK MD             D: 2020   T: 2020   MT: JENNIE      Name:     NELL RUBIO   MRN:      [id number]        Account:      [id number]   :      1960           Service Date: 2020      Document: [id number]         Outpatient Encounter Medications as of 2020   Medication Sig Dispense Refill     calcium-vitamin D (CALTRATE) 600-400 MG-UNIT per tablet Take 2 tablets by mouth daily.       cholecalciferol (VITAMIN D3) 1000 UNIT capsule Take 2 capsules by mouth daily.       cyanocobalamin (CYANOCOBALAMIN) 1000 MCG/ML injection INJECT 1ML AS DIRECTED EVERY 30 DAYS 3 mL 3     docusate sodium (COLACE) 100 MG capsule Take 1 capsule (100 mg) by mouth daily 30 capsule 1     lisinopril (PRINIVIL/ZESTRIL) 10 MG tablet Take 1 tablet (10 mg) by mouth daily 90 tablet 3     metoprolol tartrate 25 MG PO tablet Take 1 tablet (25 mg) by mouth 2 times daily 60 tablet 1      naproxen (NAPROSYN) 500 MG tablet Take 250 mg by mouth daily        No facility-administered encounter medications on file as of 2/19/2020.        Again, thank you for allowing me to participate in the care of your patient.      Sincerely,    Toi Giles MD     [facility]

## 2020-02-19 NOTE — PROGRESS NOTES
HPI and Plan:   See dictation((#[id number])    Orders Placed This Encounter   Procedures     Follow-Up with Cardiac Advanced Practice Provider     EKG 12-LEAD CLINIC READ     Echocardiogram Complete       Orders Placed This Encounter   Medications     metoprolol tartrate 25 MG PO tablet     Sig: Take 1 tablet (25 mg) by mouth 2 times daily     Dispense:  60 tablet     Refill:  1       There are no discontinued medications.      Encounter Diagnosis   Name Primary?     Palpitations        CURRENT MEDICATIONS:  Current Outpatient Medications   Medication Sig Dispense Refill     calcium-vitamin D (CALTRATE) 600-400 MG-UNIT per tablet Take 2 tablets by mouth daily.       cholecalciferol (VITAMIN D3) 1000 UNIT capsule Take 2 capsules by mouth daily.       cyanocobalamin (CYANOCOBALAMIN) 1000 MCG/ML injection INJECT 1ML AS DIRECTED EVERY 30 DAYS 3 mL 3     docusate sodium (COLACE) 100 MG capsule Take 1 capsule (100 mg) by mouth daily 30 capsule 1     lisinopril (PRINIVIL/ZESTRIL) 10 MG tablet Take 1 tablet (10 mg) by mouth daily 90 tablet 3     metoprolol tartrate 25 MG PO tablet Take 1 tablet (25 mg) by mouth 2 times daily 60 tablet 1     naproxen (NAPROSYN) 500 MG tablet Take 250 mg by mouth daily          ALLERGIES     Allergies   Allergen Reactions     Contrast Dye Hives and Itching     No Known Drug Allergy        PAST MEDICAL HISTORY:  Past Medical History:   Diagnosis Date     Hypertension      Iron deficiency anemia 2016     Lumbar foraminal stenosis 8/2015    noted on MRI     Piriformis syndrome, right 10/10/2016    Dx by neuro     Protruded cervical disc 8/2015    C4-5 noted on MRI       PAST SURGICAL HISTORY:  Past Surgical History:   Procedure Laterality Date     C EXCIS KNEE CARTILAGE,MEDIAL OR LAT  07/02/07    right knee     CHOLECYSTECTOMY       COLONOSCOPY  9/20/2011    Procedure:COMBINED COLONOSCOPY, REMOVE TUMOR/POLYP/LESION BY SNARE; Colonoscopy with polypectomy by snare; Surgeon:TERESO LIANG;  Location: GI     COLONOSCOPY N/A 9/18/2017    Procedure: COMBINED COLONOSCOPY, SINGLE OR MULTIPLE BIOPSY/POLYPECTOMY BY BIOPSY;;  Surgeon: Cale James MD;  Location:  GI     DAVINCI BYPASS GASTRIC JOSHUA-EN-Y  1993     DISCECTOMY CERVICAL MINIMALLY INVASIVE TWO LEVELS       FUSION CERVICAL ANTERIOR TWO LEVELS      C5-7     HC LIGMT REVISION,KNEE,INTRA-ARTIC  07/02/07     HYSTERECTOMY VAGINAL       HYSTERECTOMY, PAP NO LONGER INDICATED       TONSILLECTOMY         FAMILY HISTORY:  Family History   Problem Relation Age of Onset     Heart Disease Father         heart murmur and CHF     Hypertension Brother      Heart Disease Brother         stents     Thyroid Disease Mother         hypothyroid     Diabetes Mother      Skin Cancer Mother      Hypertension Sister        SOCIAL HISTORY:  Social History     Socioeconomic History     Marital status:      Spouse name: Not on file     Number of children: Not on file     Years of education: Not on file     Highest education level: Not on file   Occupational History     Not on file   Social Needs     Financial resource strain: Not on file     Food insecurity:     Worry: Not on file     Inability: Not on file     Transportation needs:     Medical: Not on file     Non-medical: Not on file   Tobacco Use     Smoking status: Current Every Day Smoker     Packs/day: 0.50     Years: 40.00     Pack years: 20.00     Types: Cigarettes     Smokeless tobacco: Never Used   Substance and Sexual Activity     Alcohol use: Yes     Alcohol/week: 5.0 standard drinks     Types: 5 Glasses of wine per week     Comment: 3-4 per week 2-3 drinks      Drug use: No     Sexual activity: Yes     Partners: Male     Birth control/protection: Surgical   Lifestyle     Physical activity:     Days per week: Not on file     Minutes per session: Not on file     Stress: Not on file   Relationships     Social connections:     Talks on phone: Not on file     Gets together: Not on file     Attends  "Zoroastrian service: Not on file     Active member of club or organization: Not on file     Attends meetings of clubs or organizations: Not on file     Relationship status: Not on file     Intimate partner violence:     Fear of current or ex partner: Not on file     Emotionally abused: Not on file     Physically abused: Not on file     Forced sexual activity: Not on file   Other Topics Concern     Parent/sibling w/ CABG, MI or angioplasty before 65F 55M? Not Asked   Social History Narrative     Not on file       Review of Systems:  Skin:  Negative       Eyes:  Positive for glasses    ENT:  Negative      Respiratory:  Positive for shortness of breath     Cardiovascular:  Negative for;chest pain;edema Positive for;palpitations;lightheadedness;dizziness;fatigue;syncope or near-syncope    Gastroenterology: Negative      Genitourinary:  Negative      Musculoskeletal:  Negative      Neurologic:  Negative      Psychiatric:  Negative      Heme/Lymph/Imm:  Positive for allergies    Endocrine:  Negative        Physical Exam:  Vitals: /78 (BP Location: Right arm, Patient Position: Fowlers, Cuff Size: Adult Large)   Pulse 84   Ht 1.715 m (5' 7.52\")   Wt 104.8 kg (231 lb)   LMP  (LMP Unknown)   SpO2 97%   BMI 35.62 kg/m      Constitutional:           Skin:             Head:           Eyes:           Lymph:      ENT:           Neck:           Respiratory:            Cardiac:                                                           GI:           Extremities and Muscular Skeletal:                 Neurological:           Psych:           CC  Carlie Corrales, APRN CNP  [address] [place] [address]              "

## 2020-02-19 NOTE — LETTER
2/19/2020      Carlie Corrales, APRN CNP  [address] [facility] [address]      RE: Kristine POPE Kenny       Dear Colleague,    I had the pleasure of seeing Kristine Cox in the [facility].    Service Date: 02/19/2020      OFFICE PROGRESS NOTE       REASON FOR CARDIOLOGY VISIT:  Palpitations.      HISTORY OF PRESENT ILLNESS:  Ms. Powell is a very pleasant 59-year-old female.  She is establishing cardiology care with us.  I also see patient's  in the clinic.  The patient is coming here for evaluation of palpitations.  The patient tells me that she had palpitations since age 20.  It has become worse recently.  She can feel them every day.  Most of the time, they feel like 1 single skipped beat sensation.  At times, she can feel about 10-15 beat run of skipped beats but never anything longer than that.  She appropriately had a Holter evaluation done through her primary care physician's office that revealed PACs.  Overall, out of 229,000 beats, around 9500 beats were PACs.  Most of them were isolated PACs.  The longest run was 12 beats at around 181 beats per minute.  No PVCs were noted.  The patient denies any dizziness, presyncope, syncope or any chest discomfort or shortness of breath.  She does use tobacco, up to about 5-6 cigarettes a day.  In the past, she has quit for more than a year.  She does not use any alcohol or any significant amount of caffeine.  She had an EKG done today that was personally reviewed by me.  Shows sinus rhythm with incomplete right bundle branch block.      The patient does have history of hypertension, is on lisinopril.      PHYSICAL EXAMINATION:   VITAL SIGNS:  Blood pressure 120/78, heart rate 84 and regular, weight 231 pounds, BMI 35.62.   GENERAL:  The patient appears pleasant, comfortable.   NECK:  Normal JVP, no bruit.   CARDIOVASCULAR SYSTEM:  S1, S2 normal, no murmur, rub or gallop.   RESPIRATORY SYSTEM:  Clear to auscultation bilaterally.    GASTROINTESTINAL SYSTEM:  Abdomen soft, nontender.   EXTREMITIES:  No pitting pedal edema.   NEUROLOGICAL:  Alert, oriented x3.   PSYCHIATRIC:  Normal affect.   SKIN:  No obvious rash.   HEENT:  No pallor or icterus.      RADIOLOGIC STUDIES:  EKG as noted above.      IMPRESSION AND PLAN:  A pleasant 59-year-old female with symptoms of palpitation due to PACs and some short duration runs of PACs, SVT.  I had a long discussion with the patient regarding the nature of her symptoms.  Often, in the absence of any structural heart disease, PACs PVCs are usually benign.  I do recommend checking an echocardiogram.  We discussed option of using beta blocker to see if this can suppress her PACs.  Common side effects of beta-blocker were discussed with the patient.  We will start with 25 mg b.i.d. of metoprolol tartrate.  I am recommending a followup in about a month, either with me or with an JACOBO and we can go over whether beta blocker has helped her symptoms and also echocardiogram.  I did  her about complete tobacco cessation.         FUENTES YORK MD             D: 2020   T: 2020   MT: JENNIE      Name:     NELL RUBIO   MRN:      [id number]        Account:      [id number]   :      1960           Service Date: 2020      Document: [id number]           Outpatient Encounter Medications as of 2020   Medication Sig Dispense Refill     calcium-vitamin D (CALTRATE) 600-400 MG-UNIT per tablet Take 2 tablets by mouth daily.       cholecalciferol (VITAMIN D3) 1000 UNIT capsule Take 2 capsules by mouth daily.       cyanocobalamin (CYANOCOBALAMIN) 1000 MCG/ML injection INJECT 1ML AS DIRECTED EVERY 30 DAYS 3 mL 3     docusate sodium (COLACE) 100 MG capsule Take 1 capsule (100 mg) by mouth daily 30 capsule 1     lisinopril (PRINIVIL/ZESTRIL) 10 MG tablet Take 1 tablet (10 mg) by mouth daily 90 tablet 3     metoprolol tartrate 25 MG PO tablet Take 1 tablet (25 mg) by mouth 2 times daily 60  tablet 1     naproxen (NAPROSYN) 500 MG tablet Take 250 mg by mouth daily        No facility-administered encounter medications on file as of 2/19/2020.        Again, thank you for allowing me to participate in the care of your patient.      Sincerely,    Toi Giles MD     [facility]

## 2020-02-25 ENCOUNTER — TELEPHONE (OUTPATIENT)
Dept: CARDIOLOGY | Facility: CLINIC | Age: 60
End: 2020-02-25

## 2020-02-25 ENCOUNTER — HOSPITAL ENCOUNTER (OUTPATIENT)
Dept: CARDIOLOGY | Facility: CLINIC | Age: 60
Discharge: HOME | End: 2020-02-25
Attending: INTERNAL MEDICINE | Admitting: INTERNAL MEDICINE
Payer: COMMERCIAL

## 2020-02-25 DIAGNOSIS — R00.2 PALPITATIONS: ICD-10-CM

## 2020-02-25 PROCEDURE — 25500064 ZZH RX 255 OP 636: Performed by: INTERNAL MEDICINE

## 2020-02-25 PROCEDURE — 93306 TTE W/DOPPLER COMPLETE: CPT | Mod: 26 | Performed by: INTERNAL MEDICINE

## 2020-02-25 RX ADMIN — HUMAN ALBUMIN MICROSPHERES AND PERFLUTREN 4 ML: 10; .22 INJECTION, SOLUTION INTRAVENOUS at 11:04

## 2020-02-25 NOTE — TELEPHONE ENCOUNTER
Left message with patient that her echocardiogram is normal and to call us back to discuss and to see how she is tolerating the metoprolol.Will update Dr. Giles. Darline MATHIAS will see patient on 3/19 in [place].          Interpretation Summary     Left ventricular systolic function is normal.  The visual ejection fraction is estimated at 55-60%.  The right ventricular systolic function is normal.  Technically difficult, suboptimal study. There is no comparison study  available.  _____________________________________________________________________________

## 2020-02-26 NOTE — TELEPHONE ENCOUNTER
Phoned patient today and reached her and I told her that her echocardiogram shows normal heart function and structure. I told her that her APC's are fairly benign in this setting. She told me that she was on metoprolol for a week and then took herself off because it was making her feel 'drowsy'. She said that she can live with them knowing now they pose no harm to her. She would like to cancel her 3/18 return visit with Darline and I told her this is fine.I gave her our nurse line number and encouraged her to call if issues arise. She appreciated the feedback and had no questions for me. I will update Dr. Giles.

## 2020-05-11 DIAGNOSIS — D51.9 ANEMIA DUE TO VITAMIN B12 DEFICIENCY, UNSPECIFIED B12 DEFICIENCY TYPE: ICD-10-CM

## 2020-05-11 DIAGNOSIS — Z98.84 S/P GASTRIC BYPASS: ICD-10-CM

## 2020-05-13 RX ORDER — CYANOCOBALAMIN 1000 UG/ML
INJECTION, SOLUTION INTRAMUSCULAR; SUBCUTANEOUS
Qty: 3 ML | Refills: 0 | OUTPATIENT
Start: 2020-05-13

## 2020-05-13 NOTE — TELEPHONE ENCOUNTER
Prescription was sent 1/29/2020 for #3 mL with 3 refills.  Pharmacy notified via E-Prescribe refusal.     COREY RosenN, RN  [facility]

## 2020-11-19 DIAGNOSIS — Z98.84 S/P GASTRIC BYPASS: ICD-10-CM

## 2020-11-19 DIAGNOSIS — D51.9 ANEMIA DUE TO VITAMIN B12 DEFICIENCY, UNSPECIFIED B12 DEFICIENCY TYPE: ICD-10-CM

## 2020-11-19 DIAGNOSIS — I10 HTN, GOAL BELOW 140/90: ICD-10-CM

## 2020-11-23 RX ORDER — CYANOCOBALAMIN 1000 UG/ML
INJECTION, SOLUTION INTRAMUSCULAR; SUBCUTANEOUS
Qty: 3 ML | Refills: 0 | Status: SHIPPED | OUTPATIENT
Start: 2020-11-23 | End: 2021-06-07

## 2020-11-23 RX ORDER — LISINOPRIL 10 MG/1
TABLET ORAL
Qty: 90 TABLET | Refills: 0 | Status: SHIPPED | OUTPATIENT
Start: 2020-11-23 | End: 2021-05-25

## 2020-11-23 NOTE — TELEPHONE ENCOUNTER
Prescription approved per [facility] Refill Protocol.    COREY RosenN, RN  [facility]

## 2021-03-26 NOTE — PATIENT INSTRUCTIONS
3/26 107  4am 126     3/25 112  8am 147 140    3/24 113  3:40am 143 104 120   3/23 111  5am 179 132 158   3/22 122  8:44am 185 155 137   3/21 112      3/20 118          Oral intake:   3/26: Writer to update M on blood sugars.        Labs will be done today.   For normal results, you will receive a letter with the results in about 2 weeks.  If anything is abnormal or unexpected, someone from the clinic will call you.      I send over a refill of your Lisinopril for when you need it.

## 2021-05-25 DIAGNOSIS — I10 HTN, GOAL BELOW 140/90: ICD-10-CM

## 2021-05-25 RX ORDER — LISINOPRIL 10 MG/1
10 TABLET ORAL DAILY
Qty: 30 TABLET | Refills: 1 | Status: SHIPPED | OUTPATIENT
Start: 2021-05-25 | End: 2021-06-07

## 2021-05-25 NOTE — TELEPHONE ENCOUNTER
"Routing refill request to provider for review/approval because:  Labs not current:  CR, Potassium  Patient needs to be seen because it has been more than 1 year since last office visit.  BP not current  Attempted to phone patient to schedule office visit/medication check for this medication and to establish care.    T'd up 1 month for provider review.    Sending to scheduling for yearly office visit due    Requested Prescriptions   Pending Prescriptions Disp Refills     lisinopril (ZESTRIL) 10 MG tablet [Pharmacy Med Name: LISINOPRIL 10MG TABLET] 90 tablet 0     Sig: TAKE 1 TABLET BY MOUTH EVERY DAY   Last Written Prescription Date:  11/23/2020  Last Fill Quantity: 90,  # refills: 0   Last office visit: 1/29/2020 with prescribing provider:     Future Office Visit:        ACE Inhibitors (Including Combos) Protocol Failed - 5/25/2021 10:07 AM        Failed - Blood pressure under 140/90 in past 12 months     BP Readings from Last 3 Encounters:   02/19/20 120/78   01/29/20 132/84   01/17/19 130/80           Failed - Recent (12 mo) or future (30 days) visit within the authorizing provider's specialty     Patient has had an office visit with the authorizing provider or a provider within the authorizing providers department within the previous 12 mos or has a future within next 30 days. See \"Patient Info\" tab in inbasket, or \"Choose Columns\" in Meds & Orders section of the refill encounter.            Failed - Normal serum creatinine on file in past 12 months     Recent Labs   Lab Test 01/29/20  0832   CR 0.85     Ok to refill medication if creatinine is low          Failed - Normal serum potassium on file in past 12 months     Recent Labs   Lab Test 01/29/20  0832   POTASSIUM 4.4           Passed - Medication is active on med list        Passed - Patient is age 18 or older        Passed - No active pregnancy on record        Passed - No positive pregnancy test within past 12 months         Nell Hilliard RN      "

## 2021-05-25 NOTE — LETTER
May 26, 2021      Kristine Cox     [facility] [address]        Dear Kristine,     We refilled your meds for 2 months, you will need to establish are and recheck your meds before then.        Sincerely,        Manoj Alvarado

## 2021-05-25 NOTE — TELEPHONE ENCOUNTER
She needs a new provider and a visit, I gave her a 2 month supply, she needs visit for further refills.

## 2021-06-04 NOTE — PROGRESS NOTES
"  Benjamin Carmona is a 61 year old who presents for the following health issues    History of Present Illness       She eats 0-1 servings of fruits and vegetables daily.She consumes 0 sweetened beverage(s) daily.She exercises with enough effort to increase her heart rate 9 or less minutes per day.  She exercises with enough effort to increase her heart rate 3 or less days per week.   She is taking medications regularly.       Establish Care  Hypertension Follow-up      Do you check your blood pressure regularly outside of the clinic? Yes     Are you following a low salt diet? Yes    Are your blood pressures ever more than 140 on the top number (systolic) OR more   than 90 on the bottom number (diastolic), for example 140/90? No    Patient presents today to establish care. She reports it has been a difficult year for her family. They moved to [place] to be closer to 's work. He was a pharmacist at the [facility] pharmacy. This closed so he lost his job. Patient reports overall she has been doing OK. She is S/P gastric bypass many years ago. She does take B12 injections, vitamin D and calcium. She reports arthritis pain has been more bothersome. Was taking too much ibuprofen causing stomach irritation. Would like an order for Celebrex instead and Famotidine for her stomach. She also reports some trouble with swelling. Blood pressure is borderline. Was on hydrochlorothiazide years ago and would like to restart this.    Has an AK on her upper lip. Has had this treated with cryo in the years past. Lesion coming back and irritated. Would like this treated again.      Review of Systems   Constitutional, HEENT, cardiovascular, pulmonary, GI, , musculoskeletal, neuro, skin, endocrine and psych systems are negative, except as otherwise noted.      Objective    /72   Pulse 88   Temp 98.6  F (37  C) (Temporal)   Resp 16   Ht 1.715 m (5' 7.5\")   Wt 106.6 kg (235 lb)   LMP  (LMP Unknown)   SpO2 99%  "  BMI 36.26 kg/m    Body mass index is 36.26 kg/m .  Physical Exam   GENERAL: healthy, alert and no distress  EYES: Eyes grossly normal to inspection, PERRL and conjunctivae and sclerae normal  HENT: ear canals and TM's normal, nose and mouth without ulcers or lesions  NECK: no adenopathy, no asymmetry, masses, or scars and thyroid normal to palpation  RESP: lungs clear to auscultation - no rales, rhonchi or wheezes  CV: regular rate and rhythm, normal S1 S2, no S3 or S4, no murmur, click or rub, no peripheral edema and peripheral pulses strong  ABDOMEN: soft, nontender, no hepatosplenomegaly, no masses and bowel sounds normal  MS: no gross musculoskeletal defects noted, no edema  SKIN: actinic keratosis on upper lip  NEURO: Normal strength and tone, mentation intact and speech normal  PSYCH: mentation appears normal, affect normal/bright    Actinic keratosis of upper lip treated with cryotherapy in freeze/thaw pattern. Patient tolerated this well.     Assessment & Plan     Encounter to establish care    Anemia due to vitamin B12 deficiency, unspecified B12 deficiency type  - cyanocobalamin (CYANOCOBALAMIN) 1000 MCG/ML injection; INJECT 1 ML AS DIRECTED EVERY 30 DAYS    S/P gastric bypass  Labs updated today. Continue diet/exericse modifications.   - cyanocobalamin (CYANOCOBALAMIN) 1000 MCG/ML injection; INJECT 1 ML AS DIRECTED EVERY 30 DAYS  - CBC with platelets  - Comprehensive metabolic panel (BMP + Alb, Alk Phos, ALT, AST, Total. Bili, TP)  - Vitamin B12  - Vitamin D Deficiency    Arthritis  Will send order for Celebrex to be used instead of Ibuprofen.   - celecoxib (CELEBREX) 100 MG capsule; Take 1 capsule (100 mg) by mouth 2 times daily    Epigastric pain  Will have patient start course of Pepcid for stomach irritation likely used to overdoing the NSAID's use.   - famotidine (PEPCID) 20 MG tablet; Take 1 tablet (20 mg) by mouth 2 times daily    Visit for screening mammogram  - *MA Screening Digital Bilateral;  Future  - Albumin Random Urine Quantitative with Creat Ratio; Future    Colon cancer screening  - GASTROENTEROLOGY ADULT REF PROCEDURE ONLY; Future    HTN, goal below 140/90  - lisinopril (ZESTRIL) 10 MG tablet; Take 1 tablet (10 mg) by mouth daily    Swelling  - hydrochlorothiazide (HYDRODIURIL) 12.5 MG tablet; Take 1 tablet (12.5 mg) by mouth daily    Severe obesity with body mass index (BMI) of 35.0 to 39.9 with serious comorbidity (H)  Encouraged ongoing diet and exercise modifications.     Screening for thyroid disorder  - TSH with free T4 reflex    AK (actinic keratosis)  Treated as above. Local wound care.   - DESTRUCT PREMALIGNANT LESION, FIRST    The patient indicates understanding of these issues and agrees with the plan.    LUCAS Lockett [facility]

## 2021-06-07 ENCOUNTER — OFFICE VISIT (OUTPATIENT)
Dept: FAMILY MEDICINE | Facility: CLINIC | Age: 61
End: 2021-06-07
Payer: COMMERCIAL

## 2021-06-07 VITALS
RESPIRATION RATE: 16 BRPM | SYSTOLIC BLOOD PRESSURE: 136 MMHG | TEMPERATURE: 98.6 F | HEIGHT: 68 IN | HEART RATE: 88 BPM | DIASTOLIC BLOOD PRESSURE: 72 MMHG | BODY MASS INDEX: 35.61 KG/M2 | WEIGHT: 235 LBS | OXYGEN SATURATION: 99 %

## 2021-06-07 DIAGNOSIS — R60.9 SWELLING: ICD-10-CM

## 2021-06-07 DIAGNOSIS — R10.13 EPIGASTRIC PAIN: ICD-10-CM

## 2021-06-07 DIAGNOSIS — Z98.84 S/P GASTRIC BYPASS: ICD-10-CM

## 2021-06-07 DIAGNOSIS — Z76.89 ENCOUNTER TO ESTABLISH CARE: Primary | ICD-10-CM

## 2021-06-07 DIAGNOSIS — Z13.29 SCREENING FOR THYROID DISORDER: ICD-10-CM

## 2021-06-07 DIAGNOSIS — Z12.31 VISIT FOR SCREENING MAMMOGRAM: ICD-10-CM

## 2021-06-07 DIAGNOSIS — E66.01 SEVERE OBESITY WITH BODY MASS INDEX (BMI) OF 35.0 TO 39.9 WITH SERIOUS COMORBIDITY (H): ICD-10-CM

## 2021-06-07 DIAGNOSIS — I10 HTN, GOAL BELOW 140/90: ICD-10-CM

## 2021-06-07 DIAGNOSIS — D51.9 ANEMIA DUE TO VITAMIN B12 DEFICIENCY, UNSPECIFIED B12 DEFICIENCY TYPE: ICD-10-CM

## 2021-06-07 DIAGNOSIS — Z12.11 COLON CANCER SCREENING: ICD-10-CM

## 2021-06-07 DIAGNOSIS — M19.90 ARTHRITIS: ICD-10-CM

## 2021-06-07 DIAGNOSIS — L57.0 AK (ACTINIC KERATOSIS): ICD-10-CM

## 2021-06-07 LAB
ALBUMIN SERPL-MCNC: 4 G/DL (ref 3.4–5)
ALP SERPL-CCNC: 103 U/L (ref 40–150)
ALT SERPL W P-5'-P-CCNC: 17 U/L (ref 0–50)
ANION GAP SERPL CALCULATED.3IONS-SCNC: 6 MMOL/L (ref 3–14)
AST SERPL W P-5'-P-CCNC: 15 U/L (ref 0–45)
BILIRUB SERPL-MCNC: 0.3 MG/DL (ref 0.2–1.3)
BUN SERPL-MCNC: 9 MG/DL (ref 7–30)
CALCIUM SERPL-MCNC: 9.5 MG/DL (ref 8.5–10.1)
CHLORIDE SERPL-SCNC: 99 MMOL/L (ref 94–109)
CO2 SERPL-SCNC: 28 MMOL/L (ref 20–32)
CREAT SERPL-MCNC: 0.66 MG/DL (ref 0.52–1.04)
ERYTHROCYTE [DISTWIDTH] IN BLOOD BY AUTOMATED COUNT: 14.8 % (ref 10–15)
GFR SERPL CREATININE-BSD FRML MDRD: >90 ML/MIN/{1.73_M2}
GLUCOSE SERPL-MCNC: 92 MG/DL (ref 70–99)
HCT VFR BLD AUTO: 37.1 % (ref 35–47)
HGB BLD-MCNC: 11.5 G/DL (ref 11.7–15.7)
MCH RBC QN AUTO: 25.8 PG (ref 26.5–33)
MCHC RBC AUTO-ENTMCNC: 31 G/DL (ref 31.5–36.5)
MCV RBC AUTO: 83 FL (ref 78–100)
PLATELET # BLD AUTO: 357 10E9/L (ref 150–450)
POTASSIUM SERPL-SCNC: 4.1 MMOL/L (ref 3.4–5.3)
PROT SERPL-MCNC: 7.8 G/DL (ref 6.8–8.8)
RBC # BLD AUTO: 4.46 10E12/L (ref 3.8–5.2)
SODIUM SERPL-SCNC: 133 MMOL/L (ref 133–144)
TSH SERPL DL<=0.005 MIU/L-ACNC: 0.78 MU/L (ref 0.4–4)
VIT B12 SERPL-MCNC: 344 PG/ML (ref 193–986)
WBC # BLD AUTO: 5.9 10E9/L (ref 4–11)

## 2021-06-07 PROCEDURE — 82306 VITAMIN D 25 HYDROXY: CPT | Performed by: PHYSICIAN ASSISTANT

## 2021-06-07 PROCEDURE — 17000 DESTRUCT PREMALG LESION: CPT | Performed by: PHYSICIAN ASSISTANT

## 2021-06-07 PROCEDURE — 36415 COLL VENOUS BLD VENIPUNCTURE: CPT | Performed by: PHYSICIAN ASSISTANT

## 2021-06-07 PROCEDURE — 84443 ASSAY THYROID STIM HORMONE: CPT | Performed by: PHYSICIAN ASSISTANT

## 2021-06-07 PROCEDURE — 85027 COMPLETE CBC AUTOMATED: CPT | Performed by: PHYSICIAN ASSISTANT

## 2021-06-07 PROCEDURE — 99214 OFFICE O/P EST MOD 30 MIN: CPT | Mod: 25 | Performed by: PHYSICIAN ASSISTANT

## 2021-06-07 PROCEDURE — 80053 COMPREHEN METABOLIC PANEL: CPT | Performed by: PHYSICIAN ASSISTANT

## 2021-06-07 PROCEDURE — 82607 VITAMIN B-12: CPT | Performed by: PHYSICIAN ASSISTANT

## 2021-06-07 RX ORDER — CYANOCOBALAMIN 1000 UG/ML
INJECTION, SOLUTION INTRAMUSCULAR; SUBCUTANEOUS
Qty: 3 ML | Refills: 3 | Status: SHIPPED | OUTPATIENT
Start: 2021-06-07 | End: 2022-06-01

## 2021-06-07 RX ORDER — FAMOTIDINE 20 MG/1
20 TABLET, FILM COATED ORAL DAILY
COMMUNITY
End: 2021-06-07

## 2021-06-07 RX ORDER — IBUPROFEN 400 MG/1
400 TABLET, FILM COATED ORAL
COMMUNITY
End: 2022-08-10

## 2021-06-07 RX ORDER — HYDROCHLOROTHIAZIDE 12.5 MG/1
12.5 TABLET ORAL DAILY
Qty: 90 TABLET | Refills: 3 | Status: SHIPPED | OUTPATIENT
Start: 2021-06-07 | End: 2022-05-27

## 2021-06-07 RX ORDER — FAMOTIDINE 20 MG/1
20 TABLET, FILM COATED ORAL 2 TIMES DAILY
Qty: 180 TABLET | Refills: 1 | Status: SHIPPED | OUTPATIENT
Start: 2021-06-07 | End: 2021-12-22

## 2021-06-07 RX ORDER — LISINOPRIL 10 MG/1
10 TABLET ORAL DAILY
Qty: 90 TABLET | Refills: 3 | Status: SHIPPED | OUTPATIENT
Start: 2021-06-07 | End: 2022-06-01

## 2021-06-07 RX ORDER — CELECOXIB 100 MG/1
100 CAPSULE ORAL 2 TIMES DAILY
Qty: 180 CAPSULE | Refills: 1 | Status: SHIPPED | OUTPATIENT
Start: 2021-06-07 | End: 2021-12-22

## 2021-06-07 ASSESSMENT — ANXIETY QUESTIONNAIRES
1. FEELING NERVOUS, ANXIOUS, OR ON EDGE: NOT AT ALL
GAD7 TOTAL SCORE: 4
4. TROUBLE RELAXING: NOT AT ALL
GAD7 TOTAL SCORE: 4
7. FEELING AFRAID AS IF SOMETHING AWFUL MIGHT HAPPEN: SEVERAL DAYS
GAD7 TOTAL SCORE: 4
3. WORRYING TOO MUCH ABOUT DIFFERENT THINGS: SEVERAL DAYS
2. NOT BEING ABLE TO STOP OR CONTROL WORRYING: SEVERAL DAYS
5. BEING SO RESTLESS THAT IT IS HARD TO SIT STILL: NOT AT ALL
7. FEELING AFRAID AS IF SOMETHING AWFUL MIGHT HAPPEN: SEVERAL DAYS
6. BECOMING EASILY ANNOYED OR IRRITABLE: SEVERAL DAYS

## 2021-06-07 ASSESSMENT — PATIENT HEALTH QUESTIONNAIRE - PHQ9
SUM OF ALL RESPONSES TO PHQ QUESTIONS 1-9: 6
10. IF YOU CHECKED OFF ANY PROBLEMS, HOW DIFFICULT HAVE THESE PROBLEMS MADE IT FOR YOU TO DO YOUR WORK, TAKE CARE OF THINGS AT HOME, OR GET ALONG WITH OTHER PEOPLE: SOMEWHAT DIFFICULT
SUM OF ALL RESPONSES TO PHQ QUESTIONS 1-9: 6

## 2021-06-07 ASSESSMENT — MIFFLIN-ST. JEOR: SCORE: 1671.51

## 2021-06-08 ENCOUNTER — TELEPHONE (OUTPATIENT)
Dept: FAMILY MEDICINE | Facility: CLINIC | Age: 61
End: 2021-06-08

## 2021-06-08 LAB — DEPRECATED CALCIDIOL+CALCIFEROL SERPL-MC: 33 UG/L (ref 20–75)

## 2021-06-08 ASSESSMENT — ANXIETY QUESTIONNAIRES: GAD7 TOTAL SCORE: 4

## 2021-06-08 NOTE — LETTER

## 2021-06-08 NOTE — TELEPHONE ENCOUNTER
Left message for patient to return call to schedule colonoscopy or EGD. If Pamella or Rosa are unavailable, please transfer to the surgery center.

## 2021-06-09 DIAGNOSIS — Z11.59 ENCOUNTER FOR SCREENING FOR OTHER VIRAL DISEASES: ICD-10-CM

## 2021-06-09 NOTE — RESULT ENCOUNTER NOTE
Please notify patient /parent that overall her labs were normal except her hemoglobin was slightly decreased. I suspect this is likely iron deficiency related. Recommended she either increase iron rich foods or start an OTC iron supplement once daily.     Laure Terry PA-C

## 2021-06-09 NOTE — TELEPHONE ENCOUNTER
Date of procedure: 7/8  Colonoscopy  Surgeon: Dr. Jordan  Prep:Miralax  Packet: mailed   Date: 6/9/2021      Surgery Scheduler

## 2021-07-05 DIAGNOSIS — Z11.59 ENCOUNTER FOR SCREENING FOR OTHER VIRAL DISEASES: ICD-10-CM

## 2021-07-05 LAB
SARS-COV-2 RNA RESP QL NAA+PROBE: NORMAL
SPECIMEN SOURCE: NORMAL

## 2021-07-05 PROCEDURE — U0003 INFECTIOUS AGENT DETECTION BY NUCLEIC ACID (DNA OR RNA); SEVERE ACUTE RESPIRATORY SYNDROME CORONAVIRUS 2 (SARS-COV-2) (CORONAVIRUS DISEASE [COVID-19]), AMPLIFIED PROBE TECHNIQUE, MAKING USE OF HIGH THROUGHPUT TECHNOLOGIES AS DESCRIBED BY CMS-2020-01-R: HCPCS | Performed by: SURGERY

## 2021-07-05 PROCEDURE — U0005 INFEC AGEN DETEC AMPLI PROBE: HCPCS | Performed by: SURGERY

## 2021-07-06 LAB
LABORATORY COMMENT REPORT: NORMAL
SARS-COV-2 RNA RESP QL NAA+PROBE: NEGATIVE
SPECIMEN SOURCE: NORMAL

## 2021-07-08 ENCOUNTER — ANESTHESIA EVENT (OUTPATIENT)
Dept: GASTROENTEROLOGY | Facility: CLINIC | Age: 61
End: 2021-07-08
Payer: COMMERCIAL

## 2021-07-08 ENCOUNTER — ANESTHESIA (OUTPATIENT)
Dept: GASTROENTEROLOGY | Facility: CLINIC | Age: 61
End: 2021-07-08
Payer: COMMERCIAL

## 2021-07-08 ENCOUNTER — HOSPITAL ENCOUNTER (OUTPATIENT)
Facility: CLINIC | Age: 61
Discharge: HOME | End: 2021-07-08
Attending: SURGERY | Admitting: SURGERY
Payer: COMMERCIAL

## 2021-07-08 VITALS
OXYGEN SATURATION: 100 % | TEMPERATURE: 98.9 F | SYSTOLIC BLOOD PRESSURE: 142 MMHG | DIASTOLIC BLOOD PRESSURE: 62 MMHG | RESPIRATION RATE: 16 BRPM

## 2021-07-08 LAB — COLONOSCOPY: NORMAL

## 2021-07-08 PROCEDURE — 250N000009 HC RX 250: Performed by: NURSE ANESTHETIST, CERTIFIED REGISTERED

## 2021-07-08 PROCEDURE — 88305 TISSUE EXAM BY PATHOLOGIST: CPT | Mod: TC | Performed by: SURGERY

## 2021-07-08 PROCEDURE — 250N000011 HC RX IP 250 OP 636: Performed by: NURSE ANESTHETIST, CERTIFIED REGISTERED

## 2021-07-08 PROCEDURE — 370N000017 HC ANESTHESIA TECHNICAL FEE, PER MIN: Performed by: SURGERY

## 2021-07-08 PROCEDURE — 258N000003 HC RX IP 258 OP 636: Performed by: NURSE ANESTHETIST, CERTIFIED REGISTERED

## 2021-07-08 PROCEDURE — 250N000009 HC RX 250: Performed by: SURGERY

## 2021-07-08 PROCEDURE — 45380 COLONOSCOPY AND BIOPSY: CPT | Performed by: SURGERY

## 2021-07-08 PROCEDURE — 88305 TISSUE EXAM BY PATHOLOGIST: CPT | Mod: 26 | Performed by: PATHOLOGY

## 2021-07-08 PROCEDURE — 45380 COLONOSCOPY AND BIOPSY: CPT | Mod: PT | Performed by: SURGERY

## 2021-07-08 RX ORDER — PROPOFOL 10 MG/ML
INJECTION, EMULSION INTRAVENOUS CONTINUOUS PRN
Status: DISCONTINUED | OUTPATIENT
Start: 2021-07-08 | End: 2021-07-08

## 2021-07-08 RX ORDER — NALOXONE HYDROCHLORIDE 0.4 MG/ML
0.2 INJECTION, SOLUTION INTRAMUSCULAR; INTRAVENOUS; SUBCUTANEOUS
Status: DISCONTINUED | OUTPATIENT
Start: 2021-07-08 | End: 2021-07-08 | Stop reason: HOSPADM

## 2021-07-08 RX ORDER — PROPOFOL 10 MG/ML
INJECTION, EMULSION INTRAVENOUS PRN
Status: DISCONTINUED | OUTPATIENT
Start: 2021-07-08 | End: 2021-07-08

## 2021-07-08 RX ORDER — SODIUM CHLORIDE, SODIUM LACTATE, POTASSIUM CHLORIDE, CALCIUM CHLORIDE 600; 310; 30; 20 MG/100ML; MG/100ML; MG/100ML; MG/100ML
INJECTION, SOLUTION INTRAVENOUS CONTINUOUS
Status: DISCONTINUED | OUTPATIENT
Start: 2021-07-08 | End: 2021-07-08 | Stop reason: HOSPADM

## 2021-07-08 RX ORDER — PROCHLORPERAZINE MALEATE 5 MG
10 TABLET ORAL EVERY 6 HOURS PRN
Status: DISCONTINUED | OUTPATIENT
Start: 2021-07-08 | End: 2021-07-08 | Stop reason: HOSPADM

## 2021-07-08 RX ORDER — ONDANSETRON 2 MG/ML
4 INJECTION INTRAMUSCULAR; INTRAVENOUS
Status: DISCONTINUED | OUTPATIENT
Start: 2021-07-08 | End: 2021-07-08 | Stop reason: HOSPADM

## 2021-07-08 RX ORDER — NALOXONE HYDROCHLORIDE 0.4 MG/ML
0.4 INJECTION, SOLUTION INTRAMUSCULAR; INTRAVENOUS; SUBCUTANEOUS
Status: DISCONTINUED | OUTPATIENT
Start: 2021-07-08 | End: 2021-07-08 | Stop reason: HOSPADM

## 2021-07-08 RX ORDER — ONDANSETRON 4 MG/1
4 TABLET, ORALLY DISINTEGRATING ORAL EVERY 6 HOURS PRN
Status: DISCONTINUED | OUTPATIENT
Start: 2021-07-08 | End: 2021-07-08 | Stop reason: HOSPADM

## 2021-07-08 RX ORDER — ONDANSETRON 2 MG/ML
4 INJECTION INTRAMUSCULAR; INTRAVENOUS EVERY 6 HOURS PRN
Status: DISCONTINUED | OUTPATIENT
Start: 2021-07-08 | End: 2021-07-08 | Stop reason: HOSPADM

## 2021-07-08 RX ORDER — LIDOCAINE HYDROCHLORIDE 20 MG/ML
INJECTION, SOLUTION INFILTRATION; PERINEURAL PRN
Status: DISCONTINUED | OUTPATIENT
Start: 2021-07-08 | End: 2021-07-08

## 2021-07-08 RX ORDER — FLUMAZENIL 0.1 MG/ML
0.2 INJECTION, SOLUTION INTRAVENOUS
Status: DISCONTINUED | OUTPATIENT
Start: 2021-07-08 | End: 2021-07-08 | Stop reason: HOSPADM

## 2021-07-08 RX ORDER — LIDOCAINE 40 MG/G
CREAM TOPICAL
Status: DISCONTINUED | OUTPATIENT
Start: 2021-07-08 | End: 2021-07-08 | Stop reason: HOSPADM

## 2021-07-08 RX ADMIN — SODIUM CHLORIDE, POTASSIUM CHLORIDE, SODIUM LACTATE AND CALCIUM CHLORIDE: 600; 310; 30; 20 INJECTION, SOLUTION INTRAVENOUS at 09:39

## 2021-07-08 RX ADMIN — PROPOFOL 50 MG: 10 INJECTION, EMULSION INTRAVENOUS at 10:09

## 2021-07-08 RX ADMIN — LIDOCAINE HYDROCHLORIDE 1 ML: 10 INJECTION, SOLUTION EPIDURAL; INFILTRATION; INTRACAUDAL; PERINEURAL at 09:39

## 2021-07-08 RX ADMIN — LIDOCAINE HYDROCHLORIDE 30 MG: 20 INJECTION, SOLUTION INFILTRATION; PERINEURAL at 10:09

## 2021-07-08 RX ADMIN — PROPOFOL 150 MCG/KG/MIN: 10 INJECTION, EMULSION INTRAVENOUS at 10:09

## 2021-07-08 ASSESSMENT — LIFESTYLE VARIABLES: TOBACCO_USE: 1

## 2021-07-08 NOTE — ANESTHESIA PREPROCEDURE EVALUATION
Anesthesia Pre-Procedure Evaluation    Patient: Kristine Cox   MRN: [id number] : 1960        Preoperative Diagnosis: Colon cancer screening [Z12.11]   Procedure : Procedure(s):  Colonoscopy with possible biopsy and/or polypectomy     Past Medical History:   Diagnosis Date     Hypertension      Iron deficiency anemia      Lumbar foraminal stenosis 2015    noted on MRI     Piriformis syndrome, right 10/10/2016    Dx by neuro     Protruded cervical disc 2015    C4-5 noted on MRI      Past Surgical History:   Procedure Laterality Date     C EXCIS KNEE CARTILAGE,MEDIAL OR LAT  07    right knee     CHOLECYSTECTOMY       COLONOSCOPY  2011    Procedure:COMBINED COLONOSCOPY, REMOVE TUMOR/POLYP/LESION BY SNARE; Colonoscopy with polypectomy by snare; Surgeon:TERESO LIANG; Location:[facility]     COLONOSCOPY N/A 2017    Procedure: COMBINED COLONOSCOPY, SINGLE OR MULTIPLE BIOPSY/POLYPECTOMY BY BIOPSY;;  Surgeon: Cale James MD;  Location: [facility]     DAVINCI BYPASS GASTRIC JOSHUA-EN-Y  1993     DISCECTOMY CERVICAL MINIMALLY INVASIVE TWO LEVELS       FUSION CERVICAL ANTERIOR TWO LEVELS      C5-7     HC LIGMT REVISION,KNEE,INTRA-ARTIC  07     HYSTERECTOMY VAGINAL       HYSTERECTOMY, PAP NO LONGER INDICATED       TONSILLECTOMY        Allergies   Allergen Reactions     Contrast Dye Hives and Itching     No Known Drug Allergy       Social History     Tobacco Use     Smoking status: Current Every Day Smoker     Packs/day: 0.50     Years: 40.00     Pack years: 20.00     Types: Cigarettes     Smokeless tobacco: Never Used   Substance Use Topics     Alcohol use: Yes     Alcohol/week: 5.0 standard drinks     Types: 5 Glasses of wine per week     Comment: 3-4 per week 2-3 drinks       Wt Readings from Last 1 Encounters:   21 106.6 kg (235 lb)        Anesthesia Evaluation   Pt has had prior anesthetic. Type: General and MAC.    No history of anesthetic complications       ROS/MED  HX  ENT/Pulmonary:     (+) tobacco use, Current use, 20  Pack-Year Hx,      Neurologic:       Cardiovascular:     (+) hypertension-----    METS/Exercise Tolerance:     Hematologic:       Musculoskeletal: Comment: - Protruded cervical disc.      GI/Hepatic: Comment: Previous Era-EN-Y    (+) bowel prep,     Renal/Genitourinary:       Endo:     (+) Obesity,     Psychiatric/Substance Use:       Infectious Disease:       Malignancy:       Other:      (+) , H/O Chronic Pain, (-) Any chance pregnant       Physical Exam    Airway        Mallampati: I       Respiratory Devices and Support         Dental  no notable dental history         Cardiovascular   cardiovascular exam normal          Pulmonary   pulmonary exam normal                OUTSIDE LABS:  CBC:   Lab Results   Component Value Date    WBC 5.9 06/07/2021    WBC 6.0 09/01/2017    HGB 11.5 (L) 06/07/2021    HGB 14.0 09/01/2017    HCT 37.1 06/07/2021    HCT 42.0 09/01/2017     06/07/2021     09/01/2017     BMP:   Lab Results   Component Value Date     06/07/2021     01/29/2020    POTASSIUM 4.1 06/07/2021    POTASSIUM 4.4 01/29/2020    CHLORIDE 99 06/07/2021    CHLORIDE 101 01/29/2020    CO2 28 06/07/2021    CO2 29 01/29/2020    BUN 9 06/07/2021    BUN 9 01/29/2020    CR 0.66 06/07/2021    CR 0.85 01/29/2020    GLC 92 06/07/2021    GLC 95 01/29/2020     COAGS:   Lab Results   Component Value Date    INR 0.89 04/15/2008     POC:   Lab Results   Component Value Date    HCG Negative 07/02/2007     HEPATIC:   Lab Results   Component Value Date    ALBUMIN 4.0 06/07/2021    PROTTOTAL 7.8 06/07/2021    ALT 17 06/07/2021    AST 15 06/07/2021    ALKPHOS 103 06/07/2021    BILITOTAL 0.3 06/07/2021     OTHER:   Lab Results   Component Value Date    JAYLAN 9.5 06/07/2021    TSH 0.78 06/07/2021       Anesthesia Plan    ASA Status:  3   NPO Status:  NPO Appropriate    Anesthesia Type: MAC.     - Reason for MAC: immobility needed               Consents    Anesthesia Plan(s) and associated risks, benefits, and realistic alternatives discussed. Questions answered and patient/representative(s) expressed understanding.     - Discussed with:  Patient      - Extended Intubation/Ventilatory Support Discussed: No.      - Patient is DNR/DNI Status: No    Use of blood products discussed: No .     Postoperative Care            Comments:                LUZ Santacruz CRNA

## 2021-07-08 NOTE — H&P
Patient seen for Endoscopy    HPI:  Patient is a 61 year old female with personal history of polyps here for colonoscopy. Not taking blood thinning medications. No MI or CVA history. No issues with previous sedation. No recent acute illness.    Review Of Systems    Skin: negative  Ears/Nose/Throat: negative  Respiratory: No shortness of breath, dyspnea on exertion, cough, or hemoptysis  Cardiovascular: negative  Gastrointestinal: negative  Genitourinary: negative  Musculoskeletal: negative  Neurologic: negative  Hematologic/Lymphatic/Immunologic: negative  Endocrine: negative      Past Medical History:   Diagnosis Date     Hypertension      Iron deficiency anemia 2016     Lumbar foraminal stenosis 8/2015    noted on MRI     Piriformis syndrome, right 10/10/2016    Dx by neuro     Protruded cervical disc 8/2015    C4-5 noted on MRI       Past Surgical History:   Procedure Laterality Date     C EXCIS KNEE CARTILAGE,MEDIAL OR LAT  07/02/07    right knee     CHOLECYSTECTOMY       COLONOSCOPY  9/20/2011    Procedure:COMBINED COLONOSCOPY, REMOVE TUMOR/POLYP/LESION BY SNARE; Colonoscopy with polypectomy by snare; Surgeon:TERESO LIANG; Location: GI     COLONOSCOPY N/A 9/18/2017    Procedure: COMBINED COLONOSCOPY, SINGLE OR MULTIPLE BIOPSY/POLYPECTOMY BY BIOPSY;;  Surgeon: Cale James MD;  Location[facility]     DAVINCI BYPASS GASTRIC JOSHUA-EN-Y  1993     DISCECTOMY CERVICAL MINIMALLY INVASIVE TWO LEVELS       FUSION CERVICAL ANTERIOR TWO LEVELS      C5-7     HC LIGMT REVISION,KNEE,INTRA-ARTIC  07/02/07     HYSTERECTOMY VAGINAL       HYSTERECTOMY, PAP NO LONGER INDICATED       TONSILLECTOMY         Family History   Problem Relation Age of Onset     Heart Disease Father         heart murmur and CHF     Hypertension Brother      Heart Disease Brother         stents     Thyroid Disease Mother         hypothyroid     Diabetes Mother      Skin Cancer Mother      Breast Cancer Sister 52       Social History      Socioeconomic History     Marital status:      Spouse name: Not on file     Number of children: Not on file     Years of education: Not on file     Highest education level: Not on file   Occupational History     Not on file   Social Needs     Financial resource strain: Not on file     Food insecurity     Worry: Not on file     Inability: Not on file     Transportation needs     Medical: Not on file     Non-medical: Not on file   Tobacco Use     Smoking status: Current Every Day Smoker     Packs/day: 0.50     Years: 40.00     Pack years: 20.00     Types: Cigarettes     Smokeless tobacco: Never Used   Substance and Sexual Activity     Alcohol use: Yes     Alcohol/week: 5.0 standard drinks     Types: 5 Glasses of wine per week     Comment: 3-4 per week 2-3 drinks      Drug use: No     Sexual activity: Yes     Partners: Male     Birth control/protection: Surgical   Lifestyle     Physical activity     Days per week: Not on file     Minutes per session: Not on file     Stress: Not on file   Relationships     Social connections     Talks on phone: Not on file     Gets together: Not on file     Attends Druze service: Not on file     Active member of club or organization: Not on file     Attends meetings of clubs or organizations: Not on file     Relationship status: Not on file     Intimate partner violence     Fear of current or ex partner: Not on file     Emotionally abused: Not on file     Physically abused: Not on file     Forced sexual activity: Not on file   Other Topics Concern     Parent/sibling w/ CABG, MI or angioplasty before 65F 55M? Not Asked   Social History Narrative     Not on file       No current outpatient medications on file.       Medications and history reviewed    Physical exam:  Vitals: BP (!) 142/62   Temp 98.9  F (37.2  C) (Oral)   Resp 16   LMP  (LMP Unknown)   SpO2 100%   BMI= There is no height or weight on file to calculate BMI.    Constitutional: Healthy, alert,  non-distressed   Head: Normo-cephalic, atraumatic, no lesions, masses or tenderness   Cardiovascular: RRR, no new murmurs, +S1, +S2 heart sounds, no clicks, rubs or gallops   Respiratory: CTAB, no rales, rhonchi or wheezing, equal chest rise, good respiratory effort   Gastrointestinal: Soft, non-tender, non distended, no rebound rigidity or guarding, no masses or hernias palpated   : Deferred  Musculoskeletal: Moves all extremities, normal  strength, no deformities noted   Skin: No suspicious lesions or rashes   Psychiatric: Mentation appears normal, affect appropriate   Hematologic/Lymphatic/Immunologic: Normal cervical and supraclavicular lymph nodes   Patient able to get up on table without difficulty.    Labs show:  No results found for this or any previous visit (from the past 24 hour(s)).    Assessment: Endoscopy  Plan: Pt cleared for anesthesia for proposed procedure.    Troy Jordan, DO

## 2021-07-08 NOTE — DISCHARGE INSTRUCTIONS
[facility]    Home Care Following Endoscopy          Activity:    You have just undergone an endoscopic procedure usually performed with conscious sedation.  Do not work or operate machinery (including a car) for at least 12 hours.      I encourage you to walk and attempt to pass this air as soon as possible.    Diet:    Return to the diet you were on before your procedure but eat lightly for the first 12-24 hours.    Drink plenty of water.    Resume any regular medications unless otherwise advised by your physician.  Please begin any new medication prescribed as a result of your procedure as directed by your physician.     If you had any biopsy or polyp removed please refrain from aspirin or aspirin products for 2 days.  If on Coumadin please restart as instructed by your physician.   Pain:    You may take Tylenol as needed for pain.  Expected Recovery:    You can expect some mild abdominal fullness and/or discomfort due to the air used to inflate your intestinal tract.    Call Your Physician if You Have:    After Colonoscopy:  o Worsening persisting abdominal pain which is worse with activity.  o Fevers (>101 degrees F), chills or shakes.  o Passage of continued blood with bowel movements.   Any questions or concerns about your recovery, please call [phone] or after hours [phone] Nurse Advice Line.    Follow-up Care:    You should receive a call or letter with your results within 1 week. Please call if you have not received a notification of your results.  If asked to return to clinic please make an appointment 1 week after your procedure.  Call [phone].

## 2021-07-08 NOTE — ANESTHESIA POSTPROCEDURE EVALUATION
Patient: Kristine Cox    Procedure(s):  COLONOSCOPY, WITH POLYPECTOMY    Diagnosis:Colon cancer screening [Z12.11]  Diagnosis Additional Information: No value filed.    Anesthesia Type:  MAC    Note:  Disposition: Outpatient   Postop Pain Control: Uneventful            Sign Out: Well controlled pain   PONV: No   Neuro/Psych: Uneventful            Sign Out: Acceptable/Baseline neuro status   Airway/Respiratory: Uneventful            Sign Out: Acceptable/Baseline resp. status   CV/Hemodynamics: Uneventful            Sign Out: Acceptable CV status   Other NRE: NONE   DID A NON-ROUTINE EVENT OCCUR? No    Event details/Postop Comments:  Pt was happy with anesthesia care.  No complications.  I will follow up with the pt if needed.           Last vitals:  Vitals:    07/08/21 0922   BP: (!) 142/62   Resp: 16   Temp: 98.9  F (37.2  C)   SpO2: 100%       Last vitals prior to Anesthesia Care Transfer:  CRNA VITALS  7/8/2021 1006 - 7/8/2021 1106      7/8/2021             Resp Rate (observed):  20          Electronically Signed By: LUZ Santacruz CRNA  July 8, 2021  11:49 AM

## 2021-07-08 NOTE — ANESTHESIA CARE TRANSFER NOTE
Patient: Kristine Cox    Procedure(s):  COLONOSCOPY, WITH POLYPECTOMY    Diagnosis: Colon cancer screening [Z12.11]  Diagnosis Additional Information: No value filed.    Anesthesia Type:   MAC     Note:    Oropharynx: oropharynx clear of all foreign objects  Level of Consciousness: awake      Independent Airway: airway patency satisfactory and stable  Dentition: dentition unchanged  Vital Signs Stable: post-procedure vital signs reviewed and stable  Report to RN Given: handoff report given  Patient transferred to: Phase II    Handoff Report: Identifed the Patient, Identified the Reponsible Provider, Reviewed the pertinent medical history, Discussed the surgical course, Reviewed Intra-OP anesthesia mangement and issues during anesthesia, Set expectations for post-procedure period and Allowed opportunity for questions and acknowledgement of understanding      Vitals: (Last set prior to Anesthesia Care Transfer)  CRNA VITALS  7/8/2021 1006 - 7/8/2021 1041      7/8/2021             Resp Rate (observed):  20        Electronically Signed By: LUZ Santacruz CRNA  July 8, 2021  10:41 AM

## 2021-07-14 LAB — COPATH REPORT: NORMAL

## 2021-12-21 DIAGNOSIS — R10.13 EPIGASTRIC PAIN: ICD-10-CM

## 2021-12-21 DIAGNOSIS — M19.90 ARTHRITIS: ICD-10-CM

## 2021-12-22 RX ORDER — FAMOTIDINE 20 MG/1
20 TABLET, FILM COATED ORAL 2 TIMES DAILY
Qty: 180 TABLET | Refills: 0 | Status: SHIPPED | OUTPATIENT
Start: 2021-12-22 | End: 2022-03-02

## 2021-12-22 RX ORDER — CELECOXIB 100 MG/1
100 CAPSULE ORAL 2 TIMES DAILY
Qty: 180 CAPSULE | Refills: 0 | Status: SHIPPED | OUTPATIENT
Start: 2021-12-22 | End: 2022-03-02

## 2021-12-22 NOTE — TELEPHONE ENCOUNTER
[facility] refill given per RN protocol.   Pharmacy note to inform pt of office visit needed for continued medication use.     Fartun Monson, RN, BSN

## 2022-03-01 DIAGNOSIS — R10.13 EPIGASTRIC PAIN: ICD-10-CM

## 2022-03-01 DIAGNOSIS — M19.90 ARTHRITIS: ICD-10-CM

## 2022-03-02 RX ORDER — CELECOXIB 100 MG/1
CAPSULE ORAL
Qty: 180 CAPSULE | Refills: 0 | Status: SHIPPED | OUTPATIENT
Start: 2022-03-02 | End: 2022-06-08

## 2022-03-02 RX ORDER — FAMOTIDINE 20 MG/1
20 TABLET, FILM COATED ORAL 2 TIMES DAILY
Qty: 180 TABLET | Refills: 0 | Status: SHIPPED | OUTPATIENT
Start: 2022-03-02 | End: 2022-06-06

## 2022-03-02 NOTE — TELEPHONE ENCOUNTER
Pepcid  Prescription approved per [facility] Refill Protocol.    Celebrex  Routing refill request to provider for review/approval because:  Labs out of range:  CBC  BP failed RN protocol    Nell Hilliard RN

## 2022-04-11 DIAGNOSIS — M19.90 ARTHRITIS: ICD-10-CM

## 2022-04-12 RX ORDER — CELECOXIB 100 MG/1
CAPSULE ORAL
Qty: 180 CAPSULE | Refills: 0 | OUTPATIENT
Start: 2022-04-12

## 2022-04-12 NOTE — TELEPHONE ENCOUNTER
Celebrex denied  Refill request too soon  Sent 3/2/2022 for 180 capsules to this pharmacy.    Nell Hilliard RN

## 2022-05-26 DIAGNOSIS — R60.9 SWELLING: ICD-10-CM

## 2022-05-27 RX ORDER — HYDROCHLOROTHIAZIDE 12.5 MG/1
12.5 TABLET ORAL DAILY
Qty: 90 TABLET | Refills: 0 | Status: SHIPPED | OUTPATIENT
Start: 2022-05-27 | End: 2022-08-10

## 2022-05-27 NOTE — TELEPHONE ENCOUNTER
left message that medication was filled and that she needed to schedule a office visit either in peson or virtual.   Thank you.  Rui LOGAN

## 2022-05-27 NOTE — TELEPHONE ENCOUNTER
Pending Prescriptions:                       Disp   Refills    hydrochlorothiazide (HYDRODIURIL) 12.5 MG *90 tab*3        Sig: Take 1 tablet (12.5 mg) by mouth daily    Routing refill request to provider for review/approval because:  BP failed RN protocol    Nell Hilliard RN

## 2022-05-29 DIAGNOSIS — D51.9 ANEMIA DUE TO VITAMIN B12 DEFICIENCY, UNSPECIFIED B12 DEFICIENCY TYPE: ICD-10-CM

## 2022-05-29 DIAGNOSIS — Z98.84 S/P GASTRIC BYPASS: ICD-10-CM

## 2022-05-29 DIAGNOSIS — I10 HTN, GOAL BELOW 140/90: ICD-10-CM

## 2022-05-29 NOTE — LETTER
[address] [address]  Phone: [phone]  Fax: [phone]    June 2, 2022      Kristine Cox                                                                                                                                   [facility] [address]      Dear Ms. Cox,    We are concerned about your health care.  We recently provided you with a medication refill.  Many medications require routine follow-up with your Doctor.      At this time we ask that: You schedule an appointment for your annual physical. Please call [phone] to schedule.    Your prescription: (lisinopril & cyanocobalamin) Has been refilled for 3 month so you may have time for the above noted follow-up.      Thank you,      Laure Terry PA-C/ Care Team

## 2022-06-01 RX ORDER — CYANOCOBALAMIN 1000 UG/ML
INJECTION, SOLUTION INTRAMUSCULAR; SUBCUTANEOUS
Qty: 3 ML | Refills: 0 | Status: SHIPPED | OUTPATIENT
Start: 2022-06-01 | End: 2022-09-06

## 2022-06-01 RX ORDER — LISINOPRIL 10 MG/1
10 TABLET ORAL DAILY
Qty: 90 TABLET | Refills: 0 | Status: SHIPPED | OUTPATIENT
Start: 2022-06-01 | End: 2022-08-10

## 2022-06-01 NOTE — TELEPHONE ENCOUNTER
Cyanocobalamin  Medication is being filled for 1 time refill only due to:  Patient needs to be seen because it has been more than one year since last visit.    Lisinopril  Routing refill request to provider for review/approval because:  BP failed RN protocol    Sending to scheduling for yearly office visit due    Nell Hilliard RN

## 2022-06-02 DIAGNOSIS — R10.13 EPIGASTRIC PAIN: ICD-10-CM

## 2022-06-06 RX ORDER — FAMOTIDINE 20 MG/1
20 TABLET, FILM COATED ORAL 2 TIMES DAILY
Qty: 180 TABLET | Refills: 0 | Status: SHIPPED | OUTPATIENT
Start: 2022-06-06 | End: 2022-08-10

## 2022-06-06 NOTE — TELEPHONE ENCOUNTER
pepcid  Prescription approved per [facility] Refill Protocol.    Sending to scheduling for yearly office visit due    Nell Hilliard RN

## 2022-06-08 DIAGNOSIS — M19.90 ARTHRITIS: ICD-10-CM

## 2022-06-08 NOTE — TELEPHONE ENCOUNTER
Pending Prescriptions:                       Disp   Refills    celecoxib (CELEBREX) 100 MG capsule        180 ca*0          Routing refill request to provider for review/approval because:

## 2022-06-08 NOTE — LETTER
[address] [address] [phone]        Juana 10, 2022    Kristine Cox     [facility] [address]              Dear Kristine Cox    We are concerned about your health care.  We recently provided you with a medication refill.  Many medications require routine follow-up with your Doctor.      At this time we ask that: You schedule a routine office visit with your physician to follow your celebrex Call the clinic at [phone] Option 1 to schedule.     Your prescription:  Has been refilled for 1 month so you may have time for the above noted follow-up.      Thank you,    Laure Terry's Care Team

## 2022-06-09 RX ORDER — CELECOXIB 100 MG/1
100 CAPSULE ORAL 2 TIMES DAILY
Qty: 60 CAPSULE | Refills: 1 | Status: SHIPPED | OUTPATIENT
Start: 2022-06-09 | End: 2022-08-10

## 2022-06-10 NOTE — TELEPHONE ENCOUNTER
I have written and printed a letter to the pt with the information below. Kylie Reddy CMA ([facility])

## 2022-08-09 DIAGNOSIS — M19.90 ARTHRITIS: ICD-10-CM

## 2022-08-10 ENCOUNTER — OFFICE VISIT (OUTPATIENT)
Dept: FAMILY MEDICINE | Facility: CLINIC | Age: 62
End: 2022-08-10
Payer: COMMERCIAL

## 2022-08-10 ENCOUNTER — HOSPITAL ENCOUNTER (OUTPATIENT)
Facility: CLINIC | Age: 62
Discharge: HOME | End: 2022-08-10
Admitting: PHYSICIAN ASSISTANT
Payer: COMMERCIAL

## 2022-08-10 VITALS
DIASTOLIC BLOOD PRESSURE: 84 MMHG | BODY MASS INDEX: 33.89 KG/M2 | TEMPERATURE: 98.4 F | RESPIRATION RATE: 14 BRPM | SYSTOLIC BLOOD PRESSURE: 146 MMHG | HEIGHT: 68 IN | WEIGHT: 223.6 LBS | HEART RATE: 74 BPM | OXYGEN SATURATION: 99 %

## 2022-08-10 DIAGNOSIS — M19.90 ARTHRITIS: ICD-10-CM

## 2022-08-10 DIAGNOSIS — B37.31 YEAST INFECTION OF THE VAGINA: ICD-10-CM

## 2022-08-10 DIAGNOSIS — Z12.31 VISIT FOR SCREENING MAMMOGRAM: ICD-10-CM

## 2022-08-10 DIAGNOSIS — E66.01 SEVERE OBESITY WITH BODY MASS INDEX (BMI) OF 35.0 TO 39.9 WITH SERIOUS COMORBIDITY (H): ICD-10-CM

## 2022-08-10 DIAGNOSIS — I10 HTN, GOAL BELOW 140/90: ICD-10-CM

## 2022-08-10 DIAGNOSIS — K60.2 ANAL FISSURE: ICD-10-CM

## 2022-08-10 DIAGNOSIS — Z12.31 SCREENING MAMMOGRAM FOR BREAST CANCER: ICD-10-CM

## 2022-08-10 DIAGNOSIS — R60.9 SWELLING: ICD-10-CM

## 2022-08-10 DIAGNOSIS — Z13.220 LIPID SCREENING: ICD-10-CM

## 2022-08-10 DIAGNOSIS — R10.13 EPIGASTRIC PAIN: ICD-10-CM

## 2022-08-10 DIAGNOSIS — Z00.00 ROUTINE HISTORY AND PHYSICAL EXAMINATION OF ADULT: Primary | ICD-10-CM

## 2022-08-10 DIAGNOSIS — D51.9 ANEMIA DUE TO VITAMIN B12 DEFICIENCY, UNSPECIFIED B12 DEFICIENCY TYPE: ICD-10-CM

## 2022-08-10 LAB
ANION GAP SERPL CALCULATED.3IONS-SCNC: 6 MMOL/L (ref 3–14)
BUN SERPL-MCNC: 9 MG/DL (ref 7–30)
CALCIUM SERPL-MCNC: 9.2 MG/DL (ref 8.5–10.1)
CHLORIDE BLD-SCNC: 99 MMOL/L (ref 94–109)
CHOLEST SERPL-MCNC: 187 MG/DL
CO2 SERPL-SCNC: 28 MMOL/L (ref 20–32)
CREAT SERPL-MCNC: 0.61 MG/DL (ref 0.52–1.04)
CREAT UR-MCNC: 22 MG/DL
ERYTHROCYTE [DISTWIDTH] IN BLOOD BY AUTOMATED COUNT: 15.6 % (ref 10–15)
FASTING STATUS PATIENT QL REPORTED: NO
GFR SERPL CREATININE-BSD FRML MDRD: >90 ML/MIN/1.73M2
GLUCOSE BLD-MCNC: 85 MG/DL (ref 70–99)
HCT VFR BLD AUTO: 33.5 % (ref 35–47)
HDLC SERPL-MCNC: 115 MG/DL
HGB BLD-MCNC: 10.4 G/DL (ref 11.7–15.7)
LDLC SERPL CALC-MCNC: 53 MG/DL
MCH RBC QN AUTO: 24.4 PG (ref 26.5–33)
MCHC RBC AUTO-ENTMCNC: 31 G/DL (ref 31.5–36.5)
MCV RBC AUTO: 79 FL (ref 78–100)
MICROALBUMIN UR-MCNC: <5 MG/L
MICROALBUMIN/CREAT UR: NORMAL MG/G{CREAT}
NONHDLC SERPL-MCNC: 72 MG/DL
PLATELET # BLD AUTO: 370 10E3/UL (ref 150–450)
POTASSIUM BLD-SCNC: 4 MMOL/L (ref 3.4–5.3)
RBC # BLD AUTO: 4.27 10E6/UL (ref 3.8–5.2)
SODIUM SERPL-SCNC: 133 MMOL/L (ref 133–144)
TRIGL SERPL-MCNC: 93 MG/DL
VIT B12 SERPL-MCNC: 470 PG/ML (ref 232–1245)
WBC # BLD AUTO: 5.4 10E3/UL (ref 4–11)

## 2022-08-10 PROCEDURE — 80061 LIPID PANEL: CPT | Performed by: PHYSICIAN ASSISTANT

## 2022-08-10 PROCEDURE — 85027 COMPLETE CBC AUTOMATED: CPT | Performed by: PHYSICIAN ASSISTANT

## 2022-08-10 PROCEDURE — 82043 UR ALBUMIN QUANTITATIVE: CPT | Performed by: PHYSICIAN ASSISTANT

## 2022-08-10 PROCEDURE — 99214 OFFICE O/P EST MOD 30 MIN: CPT | Mod: 25 | Performed by: PHYSICIAN ASSISTANT

## 2022-08-10 PROCEDURE — 80048 BASIC METABOLIC PNL TOTAL CA: CPT | Performed by: PHYSICIAN ASSISTANT

## 2022-08-10 PROCEDURE — 36415 COLL VENOUS BLD VENIPUNCTURE: CPT | Performed by: PHYSICIAN ASSISTANT

## 2022-08-10 PROCEDURE — 82306 VITAMIN D 25 HYDROXY: CPT | Performed by: PHYSICIAN ASSISTANT

## 2022-08-10 PROCEDURE — 99396 PREV VISIT EST AGE 40-64: CPT | Performed by: PHYSICIAN ASSISTANT

## 2022-08-10 PROCEDURE — 82607 VITAMIN B-12: CPT | Performed by: PHYSICIAN ASSISTANT

## 2022-08-10 RX ORDER — NYSTATIN 100000 U/G
CREAM TOPICAL 2 TIMES DAILY
Qty: 30 G | Refills: 4 | Status: SHIPPED | OUTPATIENT
Start: 2022-08-10

## 2022-08-10 RX ORDER — CELECOXIB 100 MG/1
100 CAPSULE ORAL 2 TIMES DAILY
Qty: 60 CAPSULE | Refills: 4 | Status: SHIPPED | OUTPATIENT
Start: 2022-08-10 | End: 2023-01-17

## 2022-08-10 RX ORDER — LISINOPRIL 20 MG/1
20 TABLET ORAL DAILY
Qty: 90 TABLET | Refills: 3 | Status: SHIPPED | OUTPATIENT
Start: 2022-08-10 | End: 2023-02-14

## 2022-08-10 RX ORDER — HYDROCHLOROTHIAZIDE 12.5 MG/1
12.5 TABLET ORAL DAILY
Qty: 90 TABLET | Refills: 3 | Status: SHIPPED | OUTPATIENT
Start: 2022-08-10 | End: 2023-04-06

## 2022-08-10 RX ORDER — FAMOTIDINE 20 MG/1
20 TABLET, FILM COATED ORAL 2 TIMES DAILY
Qty: 180 TABLET | Refills: 3 | Status: SHIPPED | OUTPATIENT
Start: 2022-08-10 | End: 2023-08-30

## 2022-08-10 RX ORDER — HYDROCORTISONE 25 MG/G
CREAM TOPICAL 2 TIMES DAILY PRN
Qty: 30 G | Refills: 4 | Status: SHIPPED | OUTPATIENT
Start: 2022-08-10 | End: 2024-03-06

## 2022-08-10 ASSESSMENT — PAIN SCALES - GENERAL: PAINLEVEL: MILD PAIN (2)

## 2022-08-10 NOTE — PROGRESS NOTES
SUBJECTIVE:   CC: Kristine Cox is an 62 year old woman who presents for preventive health visit.     Patient has been advised of split billing requirements and indicates understanding: Yes  HPI  Ability to successfully perform activities of daily living: Yes, no assistance needed  Home safety:  none identified   Hearing impairment: Yes, No concerns    Patient has not been routinely checking her blood pressure. Has been feeling well. She does take her medications as prescribed and is tolerating these well. Denies headaches, vision changes, chest pain or shortness of breath.     Uses Celebrex for arthritis pain and this has helped to keep the pain tolerable. Wishes to continue with this.     History of gastric bypass surgery. Due for routine labs.     GERD/epigastric pain much better controlled with Pepcid.     Has been having a lot of vaginal and rectal itching. She reports using Monistat cream as needed and this does help but symptoms recur. She feels there is now a fissure from the irritation. Takes fiber daily so her stools are very soft. She denies any rashes or skin changes.     Today's PHQ-2 Score:   PHQ-2 ( 1999 Pfizer) 6/7/2021   Q1: Little interest or pleasure in doing things 1   Q2: Feeling down, depressed or hopeless 1   PHQ-2 Score 2   PHQ-2 Total Score (12-17 Years)- Positive if 3 or more points; Administer PHQ-A if positive 2   Q1: Little interest or pleasure in doing things Several days   Q2: Feeling down, depressed or hopeless Several days   PHQ-2 Score 2       Abuse: Current or Past (Physical, Sexual or Emotional) - No  Do you feel safe in your environment? Yes        Social History     Tobacco Use     Smoking status: Current Every Day Smoker     Packs/day: 0.50     Years: 40.00     Pack years: 20.00     Types: Cigarettes     Smokeless tobacco: Never Used   Substance Use Topics     Alcohol use: Yes     Alcohol/week: 5.0 standard drinks     Types: 5 Glasses of wine per week     Comment: 3-4 per  week 2-3 drinks      If you drink alcohol do you typically have >3 drinks per day or >7 drinks per week? Yes      Alcohol Use 1/29/2020   Prescreen: >3 drinks/day or >7 drinks/week? Yes   Prescreen: >3 drinks/day or >7 drinks/week? -   AUDIT SCORE  11     AUDIT - Alcohol Use Disorders Identification Test - Reproduced from the World Health Organization Audit 2001 (Second Edition) 1/29/2020   1.  How often do you have a drink containing alcohol? 2 to 3 times a week   2.  How many drinks containing alcohol do you have on a typical day when you are drinking? 1 or 2   3.  How often do you have five or more drinks on one occasion? Monthly   4.  How often during the last year have you found that you were not able to stop drinking once you had started? Less than monthly   5.  How often during the last year have you failed to do what was normally expected of you because of drinking? Never   6.  How often during the last year have you needed a first drink in the morning to get yourself going after a heavy drinking session? Never   7.  How often during the last year have you had a feeling of guilt or remorse after drinking? Never   8.  How often during the last year have you been unable to remember what happened the night before because of your drinking? Less than monthly   9.  Have you or someone else been injured because of your drinking? Yes, but not in the last year   10. Has a relative, friend, doctor or other health care worker been concerned about your drinking or suggested you cut down? Yes, but not in the last year   TOTAL SCORE 11       Reviewed orders with patient.  Reviewed health maintenance and updated orders accordingly - Yes  BP Readings from Last 3 Encounters:   08/10/22 (!) 146/84   07/08/21 (!) 142/62   06/07/21 136/72    Wt Readings from Last 3 Encounters:   08/10/22 101.4 kg (223 lb 9.6 oz)   06/07/21 106.6 kg (235 lb)   02/19/20 104.8 kg (231 lb)                  Patient Active Problem List   Diagnosis      CARDIOVASCULAR SCREENING; LDL GOAL LESS THAN 160     HTN, goal below 140/90     Vaginal atrophy     S/P gastric bypass     History of cervical spinal surgery     Lesion of sciatic nerve, right     Lumbosacral radiculopathy     Cervicalgia     B12 deficiency anemia     Lumbar foraminal stenosis     Protruded cervical disc     Piriformis syndrome, right     Low serum sodium     Tobacco abuse     Severe obesity (BMI 35.0-39.9)     Past Surgical History:   Procedure Laterality Date     CHOLECYSTECTOMY       COLONOSCOPY  9/20/2011    Procedure:COMBINED COLONOSCOPY, REMOVE TUMOR/POLYP/LESION BY SNARE; Colonoscopy with polypectomy by snare; Surgeon:TERESO LIANG; Location:[facility]     COLONOSCOPY N/A 9/18/2017    Procedure: COMBINED COLONOSCOPY, SINGLE OR MULTIPLE BIOPSY/POLYPECTOMY BY BIOPSY;;  Surgeon: Cale James MD;  Location: [facility]     COLONOSCOPY N/A 7/8/2021    Procedure: COLONOSCOPY, WITH POLYPECTOMY;  Surgeon: Troy Jordan DO;  Location: [facility]     DAVINCI BYPASS GASTRIC JOSHUA-EN-Y  1993     DISCECTOMY CERVICAL MINIMALLY INVASIVE TWO LEVELS       FUSION CERVICAL ANTERIOR TWO LEVELS      C5-7     HYSTERECTOMY VAGINAL       HYSTERECTOMY, PAP NO LONGER INDICATED       TONSILLECTOMY       ZZC EXCIS KNEE CARTILAGE,MEDIAL OR LAT  07/02/07    right knee     ZZHC LIGMT REVISION,KNEE,INTRA-ARTIC  07/02/07       Social History     Tobacco Use     Smoking status: Current Every Day Smoker     Packs/day: 0.50     Years: 40.00     Pack years: 20.00     Types: Cigarettes     Smokeless tobacco: Never Used   Substance Use Topics     Alcohol use: Yes     Alcohol/week: 5.0 standard drinks     Types: 5 Glasses of wine per week     Comment: 3-4 per week 2-3 drinks      Family History   Problem Relation Age of Onset     Heart Disease Father         heart murmur and CHF     Hypertension Brother      Heart Disease Brother         stents     Thyroid Disease Mother         hypothyroid     Diabetes Mother       Skin Cancer Mother      Breast Cancer Sister 52         Current Outpatient Medications   Medication Sig Dispense Refill     calcium-vitamin D (CALTRATE) 600-400 MG-UNIT per tablet Take 2 tablets by mouth daily.       celecoxib (CELEBREX) 100 MG capsule Take 1 capsule (100 mg) by mouth 2 times daily 60 capsule 4     cholecalciferol (VITAMIN D3) 25 mcg (1000 units) capsule Take 2 capsules by mouth daily.       cyanocobalamin (CYANOCOBALAMIN) 1000 MCG/ML injection INJECT 1 ML AS DIRECTED EVERY 30 DAYS 3 mL 0     docusate sodium (COLACE) 100 MG capsule Take 1 capsule (100 mg) by mouth daily 30 capsule 1     famotidine (PEPCID) 20 MG tablet Take 1 tablet (20 mg) by mouth 2 times daily 180 tablet 3     hydrochlorothiazide (HYDRODIURIL) 12.5 MG tablet Take 1 tablet (12.5 mg) by mouth daily 90 tablet 3     hydrocortisone, Perianal, (HYDROCORTISONE) 2.5 % cream Place rectally 2 times daily as needed for hemorrhoids 30 g 4     lisinopril (ZESTRIL) 20 MG tablet Take 1 tablet (20 mg) by mouth daily 90 tablet 3     nystatin (MYCOSTATIN) 202095 UNIT/GM external cream Apply topically 2 times daily 30 g 4       Breast Cancer Screening:  Any new diagnosis of family breast, ovarian, or bowel cancer? No    FHS-7: No flowsheet data found.    Mammogram Screening: Recommended mammography every 1-2 years with patient discussion and risk factor consideration  Pertinent mammograms are reviewed under the imaging tab.    History of abnormal Pap smear: Status post benign hysterectomy. Health Maintenance and Surgical History updated.     Reviewed and updated as needed this visit by clinical staff   Tobacco   Meds   Med Hx              Reviewed and updated as needed this visit by Provider       Med Hx                 Review of Systems  CONSTITUTIONAL: NEGATIVE for fever, chills, change in weight  INTEGUMENTARY/SKIN: NEGATIVE for worrisome rashes, moles or lesions  EYES: NEGATIVE for vision changes or irritation  ENT: NEGATIVE for ear, mouth  "and throat problems  RESP: NEGATIVE for significant cough or SOB  BREAST: NEGATIVE for masses, tenderness or discharge  CV: NEGATIVE for chest pain, palpitations or peripheral edema  GI: NEGATIVE for nausea, abdominal pain, heartburn, or change in bowel habits  : NEGATIVE for unusual urinary or vaginal symptoms. No vaginal bleeding.  MUSCULOSKELETAL: NEGATIVE for significant arthralgias or myalgia  NEURO: NEGATIVE for weakness, dizziness or paresthesias  PSYCHIATRIC: NEGATIVE for changes in mood or affect      OBJECTIVE:   BP (!) 146/84   Pulse 74   Temp 98.4  F (36.9  C) (Oral)   Resp 14   Ht 1.727 m (5' 8\")   Wt 101.4 kg (223 lb 9.6 oz)   LMP  (LMP Unknown)   SpO2 99%   BMI 34.00 kg/m    Physical Exam  GENERAL: healthy, alert and no distress  EYES: Eyes grossly normal to inspection, PERRL and conjunctivae and sclerae normal  HENT: ear canals and TM's normal, nose and mouth without ulcers or lesions  NECK: no adenopathy, no asymmetry, masses, or scars and thyroid normal to palpation  RESP: lungs clear to auscultation - no rales, rhonchi or wheezes  BREAST: normal without masses, tenderness or nipple discharge and no palpable axillary masses or adenopathy  CV: regular rate and rhythm, normal S1 S2, no S3 or S4, no murmur, click or rub, no peripheral edema and peripheral pulses strong  ABDOMEN: soft, nontender, no hepatosplenomegaly, no masses and bowel sounds normal   (female): normal female external genitalia, normal urethral meatus , vaginal mucosa pink, moist, well rugated and rectal exam with small anal fissure  MS: no gross musculoskeletal defects noted, no edema  SKIN: no suspicious lesions or rashes  NEURO: Normal strength and tone, mentation intact and speech normal  PSYCH: mentation appears normal, affect normal/bright      ASSESSMENT/PLAN:   (Z00.00) Routine history and physical examination of adult  (primary encounter diagnosis)  Comment: Vaccinations reviewed and declined by patient today. "     (I10) HTN, goal below 140/90  Comment: Blood pressure is a bit elevated today. Recommended increasing dose of Lisinopril to 20 mg daily. New order sent to pharmacy. Recommended nurse only BP check in 1-2 months.   Plan: Basic metabolic panel  (Ca, Cl, CO2, Creat,         Gluc, K, Na, BUN), Albumin Random Urine         Quantitative with Creat Ratio, lisinopril         (ZESTRIL) 20 MG tablet    (D51.9) Anemia due to vitamin B12 deficiency, unspecified B12 deficiency type  Comment: Due for follow-up labs today.   Plan: CBC with platelets, Vitamin B12, Vitamin D         Deficiency    (Z12.31) Visit for screening mammogram    (E66.01) Severe obesity (BMI 35.0-39.9)  Comment: weight addressed. Encouraged ongoing diet and exercise modifications.     (Z13.220) Lipid screening  Plan: Lipid panel reflex to direct LDL Fasting    (Z12.31) Screening mammogram for breast cancer  Plan: *MA Screening Digital Bilateral    (R60.9) Swelling  Plan: hydrochlorothiazide (HYDRODIURIL) 12.5 MG         tablet    (R10.13) Epigastric pain  Comment: resolved with daily use of Pepcid.   Plan: famotidine (PEPCID) 20 MG tablet    (M19.90) Arthritis  Comment: Symptoms well controlled with Celebrex daily.   Plan: celecoxib (CELEBREX) 100 MG capsule    (B37.3) Yeast infection of the vagina  Comment: Will have patient start topical Nystatin. Encouraged keeping skin clean and dry. Encouraged daily probiotic.   Plan: nystatin (MYCOSTATIN) 074845 UNIT/GM external         Cream    (K60.2) Anal fissure  Comment: Continue fiber supplement to keep stools soft and patient will use anusol cream as needed.   Plan: hydrocortisone, Perianal, (HYDROCORTISONE) 2.5         % cream      Patient has been advised of split billing requirements and indicates understanding: Yes    COUNSELING:  Reviewed preventive health counseling, as reflected in patient instructions    Estimated body mass index is 34 kg/m  as calculated from the following:    Height as of this  "encounter: 1.727 m (5' 8\").    Weight as of this encounter: 101.4 kg (223 lb 9.6 oz).    Weight management plan: Discussed healthy diet and exercise guidelines    She reports that she has been smoking cigarettes. She has a 20.00 pack-year smoking history. She has never used smokeless tobacco.  Nicotine/Tobacco Cessation Plan:   Information offered: Patient not interested at this time      Counseling Resources:  ATP IV Guidelines  Pooled Cohorts Equation Calculator  Breast Cancer Risk Calculator  BRCA-Related Cancer Risk Assessment: FHS-7 Tool  FRAX Risk Assessment  ICSI Preventive Guidelines  Dietary Guidelines for Americans, 2010  USDA's MyPlate  ASA Prophylaxis  Lung CA Screening    Laure Terry PA-C  [facility]  "

## 2022-08-10 NOTE — PROGRESS NOTES
SUBJECTIVE:   CC: Kristine Cox is an 62 year old woman who presents for preventive health visit.     {Split Bill scripting  The purpose of this visit is to discuss your medical history and prevent health problems before you are sick. You may be responsible for a co-pay, coinsurance, or deductible if your visit today includes services such as checking on a sore throat, having an x-ray or lab test, or treating and evaluating a new or existing condition :839495}  {Patient advised of split billing (Optional):568105}  HPI  {Add if <65 person on Medicare  - Required Questions (Optional):400486}  {Outside tests to abstract? :730608}    {additional problems to add (Optional):498260}    Today's PHQ-2 Score:   PHQ-2 ( 1999 Pfizer) 6/7/2021   Q1: Little interest or pleasure in doing things 1   Q2: Feeling down, depressed or hopeless 1   PHQ-2 Score 2   PHQ-2 Total Score (12-17 Years)- Positive if 3 or more points; Administer PHQ-A if positive 2   Q1: Little interest or pleasure in doing things Several days   Q2: Feeling down, depressed or hopeless Several days   PHQ-2 Score 2       Abuse: Current or Past (Physical, Sexual or Emotional) - { :182127}  Do you feel safe in your environment? { :087605}        Social History     Tobacco Use     Smoking status: Current Every Day Smoker     Packs/day: 0.50     Years: 40.00     Pack years: 20.00     Types: Cigarettes     Smokeless tobacco: Never Used   Substance Use Topics     Alcohol use: Yes     Alcohol/week: 5.0 standard drinks     Types: 5 Glasses of wine per week     Comment: 3-4 per week 2-3 drinks      {Rooming Staff- Complete this question if Prescreen response is not shown below for today's visit. If you drink alcohol do you typically have >3 drinks per day or >7 drinks per week? (Optional):481918}    Alcohol Use 1/29/2020   Prescreen: >3 drinks/day or >7 drinks/week? Yes   Prescreen: >3 drinks/day or >7 drinks/week? -   AUDIT SCORE  11   {add AUDIT responses (Optional)  "(A score of 7 for adult men is an indication of hazardous drinking; a score of 8 or more is an indication of an alcohol use disorder.  A score of 7 or more for adult women is an indication of hazardous drinking or an alchohol use disorder):414503}    Reviewed orders with patient.  Reviewed health maintenance and updated orders accordingly - { :416696::\"Yes\"}  {Chronicprobdata (optional):191350}    Breast Cancer Screening:  Any new diagnosis of family breast, ovarian, or bowel cancer? {Yes_Link to Screening / No:812075}    FHS-7: No flowsheet data found.  {If any of the questions to the BCRA (FHS-7) are answered yes, consider ordering referral for genetic counseling (Optional) :520530::\"click delete button to remove this line now\"}  {AMB Mammogram Decision Support (Optional) :085864}  Pertinent mammograms are reviewed under the imaging tab.    History of abnormal Pap smear: { :429970}     Reviewed and updated as needed this visit by clinical staff   Tobacco   Meds                Reviewed and updated as needed this visit by Provider                   {HISTORY OPTIONS (Optional):052273}    Review of Systems  {FEMALE ROS (Optional):879225}     OBJECTIVE:   BP (!) 152/81 (BP Location: Right arm, Cuff Size: Adult Regular)   Pulse 74   Temp 98.4  F (36.9  C) (Oral)   Resp 14   Ht 1.727 m (5' 8\")   Wt 101.4 kg (223 lb 9.6 oz)   LMP  (LMP Unknown)   SpO2 99%   BMI 34.00 kg/m    Physical Exam  {Exam Choices (Optional):562422}    {Diagnostic Test Results (Optional):505355::\"Diagnostic Test Results:\",\"Labs reviewed in Epic\"}    ASSESSMENT/PLAN:   {Diag Picklist:247247}    {Patient advised of split billing (Optional):053652}    COUNSELING:  {FEMALE COUNSELING MESSAGES:862954::\"Reviewed preventive health counseling, as reflected in patient instructions\"}    Estimated body mass index is 34 kg/m  as calculated from the following:    Height as of this encounter: 1.727 m (5' 8\").    Weight as of this encounter: 101.4 kg " (223 lb 9.6 oz).    {Weight Management Plan (ACO) Complete if BMI is abnormal-  Ages 18-64  BMI >24.9.  Age 65+ with BMI <23 or >30 (Optional):451772}    She reports that she has been smoking cigarettes. She has a 20.00 pack-year smoking history. She has never used smokeless tobacco.  Nicotine/Tobacco Cessation Plan:   {Nicotine/Tobacco Cessation Plan:163673}      Counseling Resources:  ATP IV Guidelines  Pooled Cohorts Equation Calculator  Breast Cancer Risk Calculator  BRCA-Related Cancer Risk Assessment: FHS-7 Tool  FRAX Risk Assessment  ICSI Preventive Guidelines  Dietary Guidelines for Americans, 2010  USDA's MyPlate  ASA Prophylaxis  Lung CA Screening    LUCAS Lockett [facility]

## 2022-08-11 LAB — DEPRECATED CALCIDIOL+CALCIFEROL SERPL-MC: 38 UG/L (ref 20–75)

## 2022-08-12 ENCOUNTER — TELEPHONE (OUTPATIENT)
Dept: FAMILY MEDICINE | Facility: CLINIC | Age: 62
End: 2022-08-12

## 2022-08-12 DIAGNOSIS — D51.9 ANEMIA DUE TO VITAMIN B12 DEFICIENCY, UNSPECIFIED B12 DEFICIENCY TYPE: Primary | ICD-10-CM

## 2022-08-12 RX ORDER — FERROUS SULFATE 325(65) MG
325 TABLET ORAL DAILY
Qty: 90 TABLET | Refills: 3 | Status: SHIPPED | OUTPATIENT
Start: 2022-08-12

## 2022-08-12 RX ORDER — CELECOXIB 100 MG/1
CAPSULE ORAL
Qty: 60 CAPSULE | Refills: 1 | OUTPATIENT
Start: 2022-08-12

## 2022-08-12 NOTE — TELEPHONE ENCOUNTER
Pt called back and relayed results. Pt is not currently on iron supplement but has been before and it messed with her stomach too much so she stopped. She would be open to starting one but would like another option that won't make her so sick.

## 2022-08-12 NOTE — RESULT ENCOUNTER NOTE
Please notify patient /parent that overall her labs are stable except her hemoglobin is low at 10.4. Please inquire if she is taking an iron supplement. If she is not then I would recommend starting this once daily.     Laure Terry PA-C

## 2022-08-12 NOTE — TELEPHONE ENCOUNTER
----- Message from Laure Terry PA-C sent at 8/12/2022  8:05 AM CDT -----  Please notify patient /parent that overall her labs are stable except her hemoglobin is low at 10.4. Please inquire if she is taking an iron supplement. If she is not then I would recommend starting this once daily.     Laure Terry PA-C

## 2022-08-12 NOTE — TELEPHONE ENCOUNTER
Called patient and relayed message below. Patient stated that she will try that.   Yris Randolph MA

## 2022-08-31 DIAGNOSIS — D51.9 ANEMIA DUE TO VITAMIN B12 DEFICIENCY, UNSPECIFIED B12 DEFICIENCY TYPE: ICD-10-CM

## 2022-08-31 DIAGNOSIS — Z98.84 S/P GASTRIC BYPASS: ICD-10-CM

## 2022-09-06 RX ORDER — CYANOCOBALAMIN 1000 UG/ML
INJECTION, SOLUTION INTRAMUSCULAR; SUBCUTANEOUS
Qty: 3 ML | Refills: 0 | Status: SHIPPED | OUTPATIENT
Start: 2022-09-06 | End: 2022-12-02

## 2022-09-06 NOTE — TELEPHONE ENCOUNTER
"Prescription approved per [facility] Refill Protocol.      Requested Prescriptions   Pending Prescriptions Disp Refills    cyanocobalamin (CYANOCOBALAMIN) 1000 MCG/ML injection [Pharmacy Med Name: CYANOCOBALAMIN 1000MCG/ML INJ] 3 mL 0     Sig: INJECT 1 ML AS DIRECTED EVERY 30 DAYS        Vitamin Supplements (Adult) Protocol Passed - 8/31/2022  1:25 AM        Passed - High dose Vitamin D not ordered        Passed - Recent (12 mo) or future (30 days) visit within the authorizing provider's specialty     Patient has had an office visit with the authorizing provider or a provider within the authorizing providers department within the previous 12 mos or has a future within next 30 days. See \"Patient Info\" tab in inbasket, or \"Choose Columns\" in Meds & Orders section of the refill encounter.              Passed - Medication is active on med list            Nell Hilliard RN          "

## 2022-11-30 DIAGNOSIS — Z98.84 S/P GASTRIC BYPASS: ICD-10-CM

## 2022-11-30 DIAGNOSIS — D51.9 ANEMIA DUE TO VITAMIN B12 DEFICIENCY, UNSPECIFIED B12 DEFICIENCY TYPE: ICD-10-CM

## 2022-12-02 RX ORDER — CYANOCOBALAMIN 1000 UG/ML
INJECTION, SOLUTION INTRAMUSCULAR; SUBCUTANEOUS
Qty: 3 ML | Refills: 0 | Status: SHIPPED | OUTPATIENT
Start: 2022-12-02 | End: 2023-03-27

## 2022-12-05 DIAGNOSIS — Z98.84 S/P GASTRIC BYPASS: ICD-10-CM

## 2022-12-05 DIAGNOSIS — D51.9 ANEMIA DUE TO VITAMIN B12 DEFICIENCY, UNSPECIFIED B12 DEFICIENCY TYPE: ICD-10-CM

## 2022-12-06 RX ORDER — CYANOCOBALAMIN 1000 UG/ML
INJECTION, SOLUTION INTRAMUSCULAR; SUBCUTANEOUS
Qty: 3 ML | Refills: 0 | OUTPATIENT
Start: 2022-12-06

## 2023-01-14 DIAGNOSIS — M19.90 ARTHRITIS: ICD-10-CM

## 2023-01-14 NOTE — LETTER
[address] [address]  Phone: [phone]  Fax: [phone]        January 18, 2023      Kristine TOSHIA Kenny                                                                                                                                 [address] [place] [id number]            Dear Ms. Cox,    We are concerned about your health care.  We recently provided you with a medication refill.  Many medications require routine follow-up with your Doctor.      At this time we ask that: You schedule a Float Nurse appointment to have your blood pressure rechecked. Please call the clinic at [phone] option 1 to schedule.     Your prescription: Has been refilled for 1 time so you may have time for the above noted follow-up.      Thank you,      Laure Terry PA-C  Care Team

## 2023-01-17 RX ORDER — CELECOXIB 100 MG/1
100 CAPSULE ORAL 2 TIMES DAILY
Qty: 60 CAPSULE | Refills: 4 | Status: SHIPPED | OUTPATIENT
Start: 2023-01-17 | End: 2023-07-07

## 2023-01-17 NOTE — TELEPHONE ENCOUNTER
"Requested Prescriptions   Pending Prescriptions Disp Refills    celecoxib (CELEBREX) 100 MG capsule [Pharmacy Med Name: CELECOXIB 100MG CAPSULE] 60 capsule 4     Sig: Take 1 capsule (100 mg) by mouth 2 times daily       NSAID Medications Failed - 1/14/2023  1:00 AM        Failed - Blood pressure under 140/90 in past 12 months     BP Readings from Last 3 Encounters:   08/10/22 (!) 146/84   07/08/21 (!) 142/62   06/07/21 136/72                 Failed - Normal ALT on file in past 12 months     Recent Labs   Lab Test 06/07/21  1529   ALT 17             Failed - Normal AST on file in past 12 months     Recent Labs   Lab Test 06/07/21  1529   AST 15             Failed - Normal CBC on file in past 12 months     Recent Labs   Lab Test 08/10/22  1147   WBC 5.4   RBC 4.27   HGB 10.4*   HCT 33.5*                    Passed - Recent (12 mo) or future (30 days) visit within the authorizing provider's specialty     Patient has had an office visit with the authorizing provider or a provider within the authorizing providers department within the previous 12 mos or has a future within next 30 days. See \"Patient Info\" tab in inbasket, or \"Choose Columns\" in Meds & Orders section of the refill encounter.              Passed - Patient is age 6-64 years        Passed - Medication is active on med list        Passed - No active pregnancy on record        Passed - Normal serum creatinine on file in past 12 months     Recent Labs   Lab Test 08/10/22  1147   CR 0.61       Ok to refill medication if creatinine is low          Passed - No positive pregnancy test in past 12 months             "

## 2023-01-17 NOTE — TELEPHONE ENCOUNTER
Called and LM for patient to call back. Please relay note below. Help schedule.     Diane Hanna MA

## 2023-02-14 ENCOUNTER — TELEPHONE (OUTPATIENT)
Dept: FAMILY MEDICINE | Facility: CLINIC | Age: 63
End: 2023-02-14

## 2023-02-14 DIAGNOSIS — I10 HTN, GOAL BELOW 140/90: ICD-10-CM

## 2023-02-14 RX ORDER — LISINOPRIL 20 MG/1
20 TABLET ORAL DAILY
Qty: 90 TABLET | Refills: 3 | Status: SHIPPED | OUTPATIENT
Start: 2023-02-14 | End: 2023-04-06

## 2023-02-14 NOTE — TELEPHONE ENCOUNTER
Medication Question or Refill        What medication are you calling about (include dose and sig)?: lisinopril 10 mg    Preferred Pharmacy:     [id number] - [facility] [address] [place] [address]  Phone: [phone] Fax: [phone]      Controlled Substance Agreement on file:   CSA -- Patient Level:    CSA: None found at the patient level.       Who prescribed the medication?: PCP. 2 weeks left    Do you need a refill? Yes    When did you use the medication last? daily    Patient offered an appointment? Yes: 4.6.23    Do you have any questions or concerns?  No      Okay to leave a detailed message?: Yes at Cell number on file:    Telephone Information:   Mobile [phone]

## 2023-02-16 ENCOUNTER — HOSPITAL ENCOUNTER (OUTPATIENT)
Dept: MAMMOGRAPHY | Facility: CLINIC | Age: 63
Discharge: HOME | End: 2023-02-16
Attending: PHYSICIAN ASSISTANT | Admitting: PHYSICIAN ASSISTANT
Payer: COMMERCIAL

## 2023-02-16 DIAGNOSIS — Z12.31 SCREENING MAMMOGRAM FOR BREAST CANCER: ICD-10-CM

## 2023-02-16 PROCEDURE — 77067 SCR MAMMO BI INCL CAD: CPT

## 2023-03-27 DIAGNOSIS — D51.9 ANEMIA DUE TO VITAMIN B12 DEFICIENCY, UNSPECIFIED B12 DEFICIENCY TYPE: ICD-10-CM

## 2023-03-27 DIAGNOSIS — Z98.84 S/P GASTRIC BYPASS: ICD-10-CM

## 2023-03-29 RX ORDER — CYANOCOBALAMIN 1000 UG/ML
INJECTION, SOLUTION INTRAMUSCULAR; SUBCUTANEOUS
Qty: 3 ML | Refills: 0 | Status: SHIPPED | OUTPATIENT
Start: 2023-03-29 | End: 2023-04-06

## 2023-04-06 ENCOUNTER — OFFICE VISIT (OUTPATIENT)
Dept: FAMILY MEDICINE | Facility: CLINIC | Age: 63
End: 2023-04-06
Payer: COMMERCIAL

## 2023-04-06 VITALS
TEMPERATURE: 97.2 F | HEART RATE: 76 BPM | RESPIRATION RATE: 18 BRPM | DIASTOLIC BLOOD PRESSURE: 80 MMHG | OXYGEN SATURATION: 100 % | WEIGHT: 224.2 LBS | SYSTOLIC BLOOD PRESSURE: 134 MMHG | BODY MASS INDEX: 34.09 KG/M2

## 2023-04-06 DIAGNOSIS — I10 HTN, GOAL BELOW 140/90: Primary | ICD-10-CM

## 2023-04-06 DIAGNOSIS — D51.9 ANEMIA DUE TO VITAMIN B12 DEFICIENCY, UNSPECIFIED B12 DEFICIENCY TYPE: ICD-10-CM

## 2023-04-06 DIAGNOSIS — Z98.84 S/P GASTRIC BYPASS: ICD-10-CM

## 2023-04-06 DIAGNOSIS — Z87.891 PERSONAL HISTORY OF TOBACCO USE: ICD-10-CM

## 2023-04-06 DIAGNOSIS — R60.9 SWELLING: ICD-10-CM

## 2023-04-06 DIAGNOSIS — R05.3 CHRONIC COUGH: ICD-10-CM

## 2023-04-06 DIAGNOSIS — E66.01 SEVERE OBESITY WITH BODY MASS INDEX (BMI) OF 35.0 TO 39.9 WITH SERIOUS COMORBIDITY (H): ICD-10-CM

## 2023-04-06 PROCEDURE — 99214 OFFICE O/P EST MOD 30 MIN: CPT | Performed by: PHYSICIAN ASSISTANT

## 2023-04-06 PROCEDURE — G0296 VISIT TO DETERM LDCT ELIG: HCPCS | Performed by: PHYSICIAN ASSISTANT

## 2023-04-06 RX ORDER — ALBUTEROL SULFATE 90 UG/1
2 AEROSOL, METERED RESPIRATORY (INHALATION) EVERY 6 HOURS PRN
Qty: 36 G | Refills: 1 | Status: SHIPPED | OUTPATIENT
Start: 2023-04-06

## 2023-04-06 RX ORDER — CYANOCOBALAMIN 1000 UG/ML
INJECTION, SOLUTION INTRAMUSCULAR; SUBCUTANEOUS
Qty: 3 ML | Refills: 3 | Status: SHIPPED | OUTPATIENT
Start: 2023-04-06 | End: 2024-04-12

## 2023-04-06 RX ORDER — HYDROCHLOROTHIAZIDE 12.5 MG/1
TABLET ORAL
Qty: 90 TABLET | Refills: 3 | Status: SHIPPED | OUTPATIENT
Start: 2023-04-06 | End: 2024-03-06

## 2023-04-06 RX ORDER — LISINOPRIL 10 MG/1
10 TABLET ORAL DAILY
Qty: 90 TABLET | Refills: 1 | Status: SHIPPED | OUTPATIENT
Start: 2023-04-06 | End: 2023-09-13

## 2023-04-06 ASSESSMENT — PAIN SCALES - GENERAL: PAINLEVEL: NO PAIN (0)

## 2023-04-06 NOTE — PROGRESS NOTES
Benjamin Carmona is a 63 year old, presenting for the following health issues:  Recheck Medication        4/6/2023     1:23 PM   Additional Questions   Roomed by Jodie HERRERA     History of Present Illness       Hypertension: She presents for follow up of hypertension.  She does check blood pressure  regularly outside of the clinic. Outpatient blood pressures have not been over 140/90. She does not follow a low salt diet.     She eats 0-1 servings of fruits and vegetables daily.She consumes 1 sweetened beverage(s) daily.She exercises with enough effort to increase her heart rate 9 or less minutes per day.  She exercises with enough effort to increase her heart rate 3 or less days per week.   She is taking medications regularly.     Patient presents today for follow-up of blood pressure. Numbers have been well controlled. Tolerating medications well without side effects. Monitoring salt in diet. Patient denies headaches, vision changes, chest pain, shortness of breath or paresthesias.    Has a chronic cough. Has used an inhaler for this in the past which does help.       Review of Systems   Constitutional, HEENT, cardiovascular, pulmonary, GI, , musculoskeletal, neuro, skin, endocrine and psych systems are negative, except as otherwise noted.      Objective    /80   Pulse 76   Temp 97.2  F (36.2  C) (Tympanic)   Resp 18   Wt 101.7 kg (224 lb 3.2 oz)   LMP  (LMP Unknown)   SpO2 100%   BMI 34.09 kg/m    Body mass index is 34.09 kg/m .  Physical Exam   GENERAL: healthy, alert and no distress  HENT: ear canals and TM's normal, nose and mouth without ulcers or lesions  NECK: no adenopathy, no asymmetry, masses, or scars and thyroid normal to palpation  RESP: lungs clear to auscultation - no rales, rhonchi or wheezes  CV: regular rate and rhythm, normal S1 S2, no S3 or S4, no murmur, click or rub, no peripheral edema and peripheral pulses strong  ABDOMEN: soft, nontender, no hepatosplenomegaly, no masses  and bowel sounds normal  MS: no gross musculoskeletal defects noted, no edema  SKIN: no suspicious lesions or rashes  PSYCH: mentation appears normal, affect normal/bright      Assessment & Plan     HTN, goal below 140/90  Continue Lisinopril without change. Small dose of hydrochlorothiazide added as well to help bring blood pressure down a bit more and with chronic swelling.   - lisinopril (ZESTRIL) 10 MG tablet; Take 1 tablet (10 mg) by mouth daily    Severe obesity with body mass index (BMI) of 35.0 to 39.9 with serious comorbidity (H)  Weight addressed. Encouraged ongoing diet and exercise modifications.     Swelling  - hydrochlorothiazide (HYDRODIURIL) 12.5 MG tablet; Take 1 tablet daily as needed    Chronic cough  - albuterol (PROAIR HFA/PROVENTIL HFA/VENTOLIN HFA) 108 (90 Base) MCG/ACT inhaler; Inhale 2 puffs into the lungs every 6 hours as needed for shortness of breath, wheezing or cough    Anemia due to vitamin B12 deficiency, unspecified B12 deficiency ty  - cyanocobalamin (CYANOCOBALAMIN) 1000 MCG/ML injection; INJECT 1 ML AS DIRECTED EVERY 30 DAYS Strength: 1,000 mcg/mL    S/P gastric bypass  - cyanocobalamin (CYANOCOBALAMIN) 1000 MCG/ML injection; INJECT 1 ML AS DIRECTED EVERY 30 DAYS Strength: 1,000 mcg/mL    Personal history of tobacco use  - Prof fee: Shared Decision Making for Lung Cancer Screening  - CT Chest Lung Cancer Scrn Low Dose wo; Future    The patient indicates understanding of these issues and agrees with the plan.    LUCAS [facility]ett [facility]

## 2023-04-06 NOTE — PROGRESS NOTES
Lung Cancer Screening Shared Decision Making Visit     Kristine Cox, a 63 year old female, is eligible for lung cancer screening    History   Smoking Status     Every Day     Packs/day: 0.50     Years: 40.00     Types: Cigarettes   Smokeless Tobacco     Never       I have discussed with patient the risks and benefits of screening for lung cancer with low-dose CT.     The risks include:    radiation exposure: one low dose chest CT has as much ionizing radiation as about 15 chest x-rays, or 6 months of background radiation living in [place]      false positives: most findings/nodules are NOT cancer, but some might still require additional diagnostic evaluation, including biopsy    over-diagnosis: some slow growing cancers that might never have been clinically significant will be detected and treated unnecessarily     The benefit of early detection of lung cancer is contingent upon adherence to annual screening or more frequent follow up if indicated.     Furthermore, to benefit from screening, Kristine must be willing and able to undergo diagnostic procedures, if indicated. Although no specific guide is available for determining severity of comorbidities, it is reasonable to withhold screening in patients who have greater mortality risk from other diseases.     We did discuss that the best way to prevent lung cancer is to not smoke.    Some patients may value a numeric estimation of lung cancer risk when evaluating if lung cancer screening is right for them, here is one calculator:    ShouldIScreen

## 2023-04-06 NOTE — PATIENT INSTRUCTIONS
Lung Cancer Screening   Frequently Asked Questions  If you are at high-risk for lung cancer, getting screened with low-dose computed tomography (LDCT) every year can help save your life. This handout offers answers to some of the most common questions about lung cancer screening. If you have other questions, please call [phone] ([phone]).     What is it?  Lung cancer screening uses special X-ray technology to create an image of your lung tissue. The exam is quick and easy and takes less than 10 seconds. We don t give you any medicine or use any needles. You can eat before and after the exam. You don t need to change your clothes as long as the clothing on your chest doesn t contain metal. But, you do need to be able to hold your breath for at least 6 seconds during the exam.    What is the goal of lung cancer screening?  The goal of lung cancer screening is to save lives. Many times, lung cancer is not found until a person starts having physical symptoms. Lung cancer screening can help detect lung cancer in the earliest stages when it may be easier to treat.    Who should be screened for lung cancer?  We suggest lung cancer screening for anyone who is at high-risk for lung cancer. You are in the high-risk group if you:      are between the ages of 55 and 79, and    have smoked at least 1 pack of cigarettes a day for 20 or more years, and    still smoke or have quit within the past 15 years.    However, if you have a new cough or shortness of breath, you should talk to your doctor before being screened.    Why does it matter if I have symptoms?  Certain symptoms can be a sign that you have a condition in your lungs that should be checked and treated by your doctor. These symptoms include fever, chest pain, a new or changing cough, shortness of breath that you have never felt before, coughing up blood or unexplained weight loss. Having any of these symptoms can greatly affect the results of lung  cancer screening.       Should all smokers get an LDCT lung cancer screening exam?  It depends. Lung cancer screening is for a very specific group of men and women who have a history of heavy smoking over a long period of time (see  Who should be screened for lung cancer  above).  I am in the high-risk group, but have been diagnosed with cancer in the past. Is LDCT lung cancer screening right for me?  In some cases, you should not have LDCT lung screening, such as when your doctor is already following your cancer with CT scan studies. Your doctor will help you decide if LDCT lung screening is right for you.  Do I need to have a screening exam every year?  Yes. If you are in the high-risk group described earlier, you should get an LDCT lung cancer screening exam every year until you are 79, or are no longer willing or able to undergo screening and possible procedures to diagnose and treat lung cancer.  How effective is LDCT at preventing death from lung cancer?  Studies have shown that LDCT lung cancer screening can lower the risk of death from lung cancer by 20 percent in people who are at high-risk.  What are the risks?  There are some risks and limitations of LDCT lung cancer screening. We want to make sure you understand the risks and benefits, so please let us know if you have any questions. Your doctor may want to talk with you more about these risks.    Radiation exposure: As with any exam that uses radiation, there is a very small increased risk of cancer. The amount of radiation in LDCT is small--about the same amount a person would get from a mammogram. Your doctor orders the exam when he or she feels the potential benefits outweigh the risks.    False negatives: No test is perfect, including LDCT. It is possible that you may have a medical condition, including lung cancer, that is not found during your exam. This is called a false negative result.    False positives and more testing: LDCT very often finds  something in the lung that could be cancer, but in fact is not. This is called a false positive result. False positive tests often cause anxiety. To make sure these findings are not cancer, you may need to have more tests. These tests will be done only if you give us permission. Sometimes patients need a treatment that can have side effects, such as a biopsy. For more information on false positives, see  What can I expect from the results?     Findings not related to lung cancer: Your LDCT exam also takes pictures of areas of your body next to your lungs. In a very small number of cases, the CT scan will show an abnormal finding in one of these areas, such as your kidneys, adrenal glands, liver or thyroid. This finding may not be serious, but you may need more tests. Your doctor can help you decide what other tests you may need, if any.  What can I expect from the results?  About 1 out of 4 LDCT exams will find something that may need more tests. Most of the time, these findings are lung nodules. Lung nodules are very small collections of tissue in the lung. These nodules are very common, and the vast majority--more than 97 percent--are not cancer (benign). Most are normal lymph nodes or small areas of scarring from past infections.  But, if a small lung nodule is found to be cancer, the cancer can be cured more than 90 percent of the time. To know if the nodule is cancer, we may need to get more images before your next yearly screening exam. If the nodule has suspicious features (for example, it is large, has an odd shape or grows over time), we will refer you to a specialist for further testing.  Will my doctor also get the results?  Yes. Your doctor will get a copy of your results.  Is it okay to keep smoking now that there s a cancer screening exam?  No. Tobacco is one of the strongest cancer-causing agents. It causes not only lung cancer, but other cancers and cardiovascular (heart) diseases as well. The damage  caused by smoking builds over time. This means that the longer you smoke, the higher your risk of disease. While it is never too late to quit, the sooner you quit, the better.  Where can I find help to quit smoking?  The best way to prevent lung cancer is to stop smoking. If you have already quit smoking, congratulations and keep it up! For help on quitting smoking, please call [place] at 2-832-QUITNOW (1-176.139.1761) or the [employer] at 1-677.830.6120 to find local resources near you.  One-on-one health coaching:  If you d prefer to work individually with a health care provider on tobacco cessation, we offer:      Medication Therapy Management:  Our specially trained pharmacists work closely with you and your doctor to help you quit smoking.  Call [phone] or [phone] (toll free).

## 2023-07-03 DIAGNOSIS — M19.90 ARTHRITIS: ICD-10-CM

## 2023-07-03 NOTE — TELEPHONE ENCOUNTER
"Requested Prescriptions   Pending Prescriptions Disp Refills    celecoxib (CELEBREX) 100 MG capsule [Pharmacy Med Name: CELECOXIB 100MG CAPSULE] 60 capsule 4     Sig: TAKE 1 CAPSULE (100 MG) BY MOUTH 2 TIMES DAILY       NSAID Medications Failed - 7/3/2023  9:24 AM        Failed - Normal ALT on file in past 12 months     Recent Labs   Lab Test 06/07/21  1529   ALT 17             Failed - Normal AST on file in past 12 months     Recent Labs   Lab Test 06/07/21  1529   AST 15             Failed - Normal CBC on file in past 12 months     Recent Labs   Lab Test 08/10/22  1147   WBC 5.4   RBC 4.27   HGB 10.4*   HCT 33.5*                    Passed - Blood pressure under 140/90 in past 12 months     BP Readings from Last 3 Encounters:   04/06/23 134/80   08/10/22 (!) 146/84   07/08/21 (!) 142/62                 Passed - Recent (12 mo) or future (30 days) visit within the authorizing provider's specialty     Patient has had an office visit with the authorizing provider or a provider within the authorizing providers department within the previous 12 mos or has a future within next 30 days. See \"Patient Info\" tab in inbasket, or \"Choose Columns\" in Meds & Orders section of the refill encounter.              Passed - Patient is age 6-64 years        Passed - Medication is active on med list        Passed - No active pregnancy on record        Passed - Normal serum creatinine on file in past 12 months     Recent Labs   Lab Test 08/10/22  1147   CR 0.61       Ok to refill medication if creatinine is low          Passed - No positive pregnancy test in past 12 months             "

## 2023-07-07 RX ORDER — CELECOXIB 100 MG/1
100 CAPSULE ORAL 2 TIMES DAILY
Qty: 60 CAPSULE | Refills: 4 | Status: SHIPPED | OUTPATIENT
Start: 2023-07-07 | End: 2023-11-30

## 2023-07-11 ENCOUNTER — PATIENT OUTREACH (OUTPATIENT)
Dept: CARE COORDINATION | Facility: CLINIC | Age: 63
End: 2023-07-11
Payer: COMMERCIAL

## 2023-07-25 ENCOUNTER — PATIENT OUTREACH (OUTPATIENT)
Dept: CARE COORDINATION | Facility: CLINIC | Age: 63
End: 2023-07-25
Payer: COMMERCIAL

## 2023-08-30 DIAGNOSIS — R10.13 EPIGASTRIC PAIN: ICD-10-CM

## 2023-08-30 RX ORDER — FAMOTIDINE 20 MG/1
20 TABLET, FILM COATED ORAL 2 TIMES DAILY
Qty: 180 TABLET | Refills: 1 | Status: SHIPPED | OUTPATIENT
Start: 2023-08-30 | End: 2024-03-01

## 2023-09-13 DIAGNOSIS — I10 HTN, GOAL BELOW 140/90: ICD-10-CM

## 2023-09-13 RX ORDER — LISINOPRIL 10 MG/1
10 TABLET ORAL DAILY
Qty: 60 TABLET | Refills: 0 | Status: SHIPPED | OUTPATIENT
Start: 2023-09-13 | End: 2023-11-17

## 2023-11-16 DIAGNOSIS — I10 HTN, GOAL BELOW 140/90: ICD-10-CM

## 2023-11-16 NOTE — LETTER
11/16/2023        RE: Kristine Cox  [address]  [facility] [address]        We are concerned about your health care.  We recently provided you with a medication refill.  Many medications require routine follow-up with your Doctor.      At this time we ask that: You schedule a routine office visit with your physician to follow your medications.  Call the clinic at [phone] Option 1 to schedule.     Your prescription:  Has been filled. Please schedule a follow up visit for first available appointment. Courtesy refills will be given if needed until your scheduled appointment.       Thank you   [facility] Care Team  [phone]

## 2023-11-17 RX ORDER — LISINOPRIL 10 MG/1
10 TABLET ORAL DAILY
Qty: 90 TABLET | Refills: 0 | Status: SHIPPED | OUTPATIENT
Start: 2023-11-17 | End: 2024-02-21

## 2023-11-25 ENCOUNTER — APPOINTMENT (OUTPATIENT)
Dept: CT IMAGING | Facility: CLINIC | Age: 63
End: 2023-11-25
Attending: FAMILY MEDICINE
Payer: COMMERCIAL

## 2023-11-25 ENCOUNTER — HOSPITAL ENCOUNTER (EMERGENCY)
Facility: CLINIC | Age: 63
Discharge: HOME | End: 2023-11-25
Attending: FAMILY MEDICINE | Admitting: FAMILY MEDICINE
Payer: COMMERCIAL

## 2023-11-25 ENCOUNTER — APPOINTMENT (OUTPATIENT)
Dept: GENERAL RADIOLOGY | Facility: CLINIC | Age: 63
End: 2023-11-25
Attending: FAMILY MEDICINE
Payer: COMMERCIAL

## 2023-11-25 VITALS
OXYGEN SATURATION: 93 % | SYSTOLIC BLOOD PRESSURE: 125 MMHG | RESPIRATION RATE: 15 BRPM | HEART RATE: 68 BPM | TEMPERATURE: 98.2 F | BODY MASS INDEX: 33.95 KG/M2 | WEIGHT: 224 LBS | HEIGHT: 68 IN | DIASTOLIC BLOOD PRESSURE: 64 MMHG

## 2023-11-25 DIAGNOSIS — R07.89 ATYPICAL CHEST PAIN: ICD-10-CM

## 2023-11-25 DIAGNOSIS — R10.9 LEFT SIDED ABDOMINAL PAIN: ICD-10-CM

## 2023-11-25 LAB
ALBUMIN SERPL BCG-MCNC: 4.2 G/DL (ref 3.5–5.2)
ALBUMIN UR-MCNC: NEGATIVE MG/DL
ALP SERPL-CCNC: 92 U/L (ref 40–150)
ALT SERPL W P-5'-P-CCNC: 8 U/L (ref 0–50)
ANION GAP SERPL CALCULATED.3IONS-SCNC: 16 MMOL/L (ref 7–15)
APPEARANCE UR: CLEAR
AST SERPL W P-5'-P-CCNC: 28 U/L (ref 0–45)
BASOPHILS # BLD AUTO: 0.1 10E3/UL (ref 0–0.2)
BASOPHILS NFR BLD AUTO: 1 %
BILIRUB SERPL-MCNC: 0.2 MG/DL
BILIRUB UR QL STRIP: NEGATIVE
BUN SERPL-MCNC: 7.7 MG/DL (ref 8–23)
CALCIUM SERPL-MCNC: 9.2 MG/DL (ref 8.8–10.2)
CHLORIDE SERPL-SCNC: 94 MMOL/L (ref 98–107)
COLOR UR AUTO: ABNORMAL
CREAT SERPL-MCNC: 0.65 MG/DL (ref 0.51–0.95)
D DIMER PPP FEU-MCNC: 3.69 UG/ML FEU (ref 0–0.5)
DEPRECATED HCO3 PLAS-SCNC: 20 MMOL/L (ref 22–29)
EGFRCR SERPLBLD CKD-EPI 2021: >90 ML/MIN/1.73M2
EOSINOPHIL # BLD AUTO: 0.1 10E3/UL (ref 0–0.7)
EOSINOPHIL NFR BLD AUTO: 3 %
ERYTHROCYTE [DISTWIDTH] IN BLOOD BY AUTOMATED COUNT: 15.7 % (ref 10–15)
GLUCOSE SERPL-MCNC: 92 MG/DL (ref 70–99)
GLUCOSE UR STRIP-MCNC: NEGATIVE MG/DL
HCT VFR BLD AUTO: 32.9 % (ref 35–47)
HGB BLD-MCNC: 10.2 G/DL (ref 11.7–15.7)
HGB UR QL STRIP: NEGATIVE
IMM GRANULOCYTES # BLD: 0 10E3/UL
IMM GRANULOCYTES NFR BLD: 0 %
KETONES UR STRIP-MCNC: NEGATIVE MG/DL
LEUKOCYTE ESTERASE UR QL STRIP: NEGATIVE
LYMPHOCYTES # BLD AUTO: 1.9 10E3/UL (ref 0.8–5.3)
LYMPHOCYTES NFR BLD AUTO: 37 %
MCH RBC QN AUTO: 24.8 PG (ref 26.5–33)
MCHC RBC AUTO-ENTMCNC: 31 G/DL (ref 31.5–36.5)
MCV RBC AUTO: 80 FL (ref 78–100)
MONOCYTES # BLD AUTO: 0.4 10E3/UL (ref 0–1.3)
MONOCYTES NFR BLD AUTO: 7 %
MUCOUS THREADS #/AREA URNS LPF: PRESENT /LPF
NEUTROPHILS # BLD AUTO: 2.6 10E3/UL (ref 1.6–8.3)
NEUTROPHILS NFR BLD AUTO: 52 %
NITRATE UR QL: NEGATIVE
NRBC # BLD AUTO: 0 10E3/UL
NRBC BLD AUTO-RTO: 0 /100
PH UR STRIP: 6 [PH] (ref 5–7)
PLATELET # BLD AUTO: 300 10E3/UL (ref 150–450)
POTASSIUM SERPL-SCNC: 3.7 MMOL/L (ref 3.4–5.3)
PROT SERPL-MCNC: 7.1 G/DL (ref 6.4–8.3)
RBC # BLD AUTO: 4.12 10E6/UL (ref 3.8–5.2)
RBC URINE: 0 /HPF
SODIUM SERPL-SCNC: 130 MMOL/L (ref 135–145)
SP GR UR STRIP: 1 (ref 1–1.03)
TROPONIN T SERPL HS-MCNC: <6 NG/L
UROBILINOGEN UR STRIP-MCNC: NORMAL MG/DL
WBC # BLD AUTO: 5 10E3/UL (ref 4–11)
WBC URINE: <1 /HPF

## 2023-11-25 PROCEDURE — 250N000009 HC RX 250: Performed by: FAMILY MEDICINE

## 2023-11-25 PROCEDURE — 81001 URINALYSIS AUTO W/SCOPE: CPT | Performed by: FAMILY MEDICINE

## 2023-11-25 PROCEDURE — 85379 FIBRIN DEGRADATION QUANT: CPT | Performed by: FAMILY MEDICINE

## 2023-11-25 PROCEDURE — 96375 TX/PRO/DX INJ NEW DRUG ADDON: CPT | Performed by: FAMILY MEDICINE

## 2023-11-25 PROCEDURE — 250N000011 HC RX IP 250 OP 636: Performed by: FAMILY MEDICINE

## 2023-11-25 PROCEDURE — 99285 EMERGENCY DEPT VISIT HI MDM: CPT | Mod: 25 | Performed by: FAMILY MEDICINE

## 2023-11-25 PROCEDURE — 36415 COLL VENOUS BLD VENIPUNCTURE: CPT | Performed by: FAMILY MEDICINE

## 2023-11-25 PROCEDURE — 93005 ELECTROCARDIOGRAM TRACING: CPT | Performed by: FAMILY MEDICINE

## 2023-11-25 PROCEDURE — 71045 X-RAY EXAM CHEST 1 VIEW: CPT

## 2023-11-25 PROCEDURE — 250N000011 HC RX IP 250 OP 636: Mod: JZ | Performed by: FAMILY MEDICINE

## 2023-11-25 PROCEDURE — 85025 COMPLETE CBC W/AUTO DIFF WBC: CPT | Performed by: FAMILY MEDICINE

## 2023-11-25 PROCEDURE — 96374 THER/PROPH/DIAG INJ IV PUSH: CPT | Mod: 59 | Performed by: FAMILY MEDICINE

## 2023-11-25 PROCEDURE — 80053 COMPREHEN METABOLIC PANEL: CPT | Performed by: FAMILY MEDICINE

## 2023-11-25 PROCEDURE — 93010 ELECTROCARDIOGRAM REPORT: CPT | Performed by: FAMILY MEDICINE

## 2023-11-25 PROCEDURE — 258N000003 HC RX IP 258 OP 636: Performed by: FAMILY MEDICINE

## 2023-11-25 PROCEDURE — 99284 EMERGENCY DEPT VISIT MOD MDM: CPT | Mod: 25 | Performed by: FAMILY MEDICINE

## 2023-11-25 PROCEDURE — 84484 ASSAY OF TROPONIN QUANT: CPT | Performed by: FAMILY MEDICINE

## 2023-11-25 PROCEDURE — 71275 CT ANGIOGRAPHY CHEST: CPT

## 2023-11-25 RX ORDER — DIPHENHYDRAMINE HYDROCHLORIDE 50 MG/ML
50 INJECTION INTRAMUSCULAR; INTRAVENOUS ONCE
Status: COMPLETED | OUTPATIENT
Start: 2023-11-25 | End: 2023-11-25

## 2023-11-25 RX ORDER — IOPAMIDOL 755 MG/ML
500 INJECTION, SOLUTION INTRAVASCULAR ONCE
Status: COMPLETED | OUTPATIENT
Start: 2023-11-25 | End: 2023-11-25

## 2023-11-25 RX ORDER — METHYLPREDNISOLONE SODIUM SUCCINATE 125 MG/2ML
125 INJECTION, POWDER, LYOPHILIZED, FOR SOLUTION INTRAMUSCULAR; INTRAVENOUS ONCE
Status: COMPLETED | OUTPATIENT
Start: 2023-11-25 | End: 2023-11-25

## 2023-11-25 RX ADMIN — METHYLPREDNISOLONE SODIUM SUCCINATE 125 MG: 125 INJECTION, POWDER, FOR SOLUTION INTRAMUSCULAR; INTRAVENOUS at 03:46

## 2023-11-25 RX ADMIN — SODIUM CHLORIDE 250 ML: 9 INJECTION, SOLUTION INTRAVENOUS at 03:57

## 2023-11-25 RX ADMIN — SODIUM CHLORIDE 70 ML: 9 INJECTION, SOLUTION INTRAVENOUS at 04:25

## 2023-11-25 RX ADMIN — IOPAMIDOL 75 ML: 755 INJECTION, SOLUTION INTRAVENOUS at 04:25

## 2023-11-25 RX ADMIN — DIPHENHYDRAMINE HYDROCHLORIDE 50 MG: 50 INJECTION, SOLUTION INTRAMUSCULAR; INTRAVENOUS at 03:46

## 2023-11-25 ASSESSMENT — ACTIVITIES OF DAILY LIVING (ADL)
ADLS_ACUITY_SCORE: 35
ADLS_ACUITY_SCORE: 35

## 2023-11-25 NOTE — ED PROVIDER NOTES
"                                                            [facility] ED Provider Note   Patient: Kristine Cox  MRN #:  [id number]  Date of Visit: November 25, 2023    CC:     Chief Complaint   Patient presents with    Abdominal Pain    Chest Pain     HPI:  Kristine Cox is a 63 year old female who presented to the emergency department ambulance with sudden onset of left-sided abdominal pain that started around 5:30 PM.  Patient went to bed around 10:30 PM, and this was when she noticed that the pain had migrated up into the left side of the chest into the left side of the neck, and then out to the left shoulder.  Pain was very isolated in 1 or 2 \"strips\" with a constant quality of sharp pain.  Pain was not affected by deep breathing or position.  She took some Tums initially.  Patient initially thought that her left-sided abdominal pain was due to some gas.  She has lactose intolerance and ate mac & cheese for dinner.  Shortly after eating, she thought that she needed to pass some gas.  The left-sided abdominal pain started in, and it was not until 5 hours later that the pain sharply and quickly migrated up into the left side of the neck and chest.  There is no central chest pain, shortness of breath, diaphoresis or nausea.  EMS administered 4 baby aspirin's, and the pain then quickly dissipated.  Current pain level is 0 out of 10.  She has never had this type of pain before.  Patient has significant past medical history including tobacco use of 1/2 pack of cigarettes per day, and half a pint of alcohol per night.  She has had several surgeries including gastric bypass surgery, hysterectomy, appendectomy, tonsillectomy and cervical spine fusion.    Problem List:  Patient Active Problem List    Diagnosis Date Noted    Severe obesity (BMI 35.0-39.9) 08/30/2017     Priority: Medium    Piriformis syndrome, right 10/10/2016     Priority: Medium     Dx by neuro      Low serum sodium 10/10/2016     Priority: " Medium    Tobacco abuse 10/10/2016     Priority: Medium    History of cervical spinal surgery 08/05/2015     Priority: Medium    Lesion of sciatic nerve, right 08/05/2015     Priority: Medium    Lumbosacral radiculopathy 08/05/2015     Priority: Medium    Cervicalgia 08/05/2015     Priority: Medium    B12 deficiency anemia 08/05/2015     Priority: Medium    Lumbar foraminal stenosis 08/01/2015     Priority: Medium     noted on MRI      Protruded cervical disc 08/01/2015     Priority: Medium     C4-5 noted on MRI      S/P gastric bypass 08/21/2014     Priority: Medium     September 1993      Vaginal atrophy 06/30/2014     Priority: Medium    HTN, goal below 140/90 04/07/2013     Priority: Medium    CARDIOVASCULAR SCREENING; LDL GOAL LESS THAN 160 11/28/2011     Priority: Medium       Past Medical History:   Diagnosis Date    Hypertension     Iron deficiency anemia 2016    Lumbar foraminal stenosis 8/2015    Piriformis syndrome, right 10/10/2016    Protruded cervical disc 8/2015       MEDS: albuterol (PROAIR HFA/PROVENTIL HFA/VENTOLIN HFA) 108 (90 Base) MCG/ACT inhaler  calcium-vitamin D (CALTRATE) 600-400 MG-UNIT per tablet  celecoxib (CELEBREX) 100 MG capsule  cholecalciferol (VITAMIN D3) 25 mcg (1000 units) capsule  cyanocobalamin (CYANOCOBALAMIN) 1000 MCG/ML injection  docusate sodium (COLACE) 100 MG capsule  famotidine (PEPCID) 20 MG tablet  ferrous sulfate (FEROSUL) 325 (65 Fe) MG tablet  hydrochlorothiazide (HYDRODIURIL) 12.5 MG tablet  hydrocortisone, Perianal, (HYDROCORTISONE) 2.5 % cream  lisinopril (ZESTRIL) 10 MG tablet  nystatin (MYCOSTATIN) 114997 UNIT/GM external cream        ALLERGIES:    Allergies   Allergen Reactions    Contrast Dye Hives and Itching    No Known Drug Allergy        Past Surgical History:   Procedure Laterality Date    CHOLECYSTECTOMY      COLONOSCOPY  9/20/2011    Procedure:COMBINED COLONOSCOPY, REMOVE TUMOR/POLYP/LESION BY SNARE; Colonoscopy with polypectomy by snare;  "Surgeon:TERESO LIANG; Location: GI    COLONOSCOPY N/A 9/18/2017    Procedure: COMBINED COLONOSCOPY, SINGLE OR MULTIPLE BIOPSY/POLYPECTOMY BY BIOPSY;;  Surgeon: Cale James MD;  Location:  GI    COLONOSCOPY N/A 7/8/2021    Procedure: COLONOSCOPY, WITH POLYPECTOMY;  Surgeon: Troy Jordan DO;  Location:  GI    DAVINCI BYPASS GASTRIC JOSHUA-EN-Y  1993    DISCECTOMY CERVICAL MINIMALLY INVASIVE TWO LEVELS      FUSION CERVICAL ANTERIOR TWO LEVELS      C5-7    HYSTERECTOMY VAGINAL      HYSTERECTOMY, PAP NO LONGER INDICATED      TONSILLECTOMY      ZZC EXCIS KNEE CARTILAGE,MEDIAL OR LAT  07/02/07    right knee    ZZHC LIGMT REVISION,KNEE,INTRA-ARTIC  07/02/07       Social History     Tobacco Use    Smoking status: Every Day     Packs/day: 0.50     Years: 40.00     Additional pack years: 0.00     Total pack years: 20.00     Types: Cigarettes    Smokeless tobacco: Never   Substance Use Topics    Alcohol use: Yes     Alcohol/week: 5.0 standard drinks of alcohol     Types: 5 Glasses of wine per week     Comment: 3-4 per week 2-3 drinks     Drug use: No         Review of Systems   Except as noted in HPI, all other systems were reviewed and are negative    Physical Exam   Vitals were reviewed  Patient Vitals for the past 12 hrs:   BP Temp Temp src Pulse Resp SpO2 Height Weight   11/25/23 0530 125/64 -- -- 68 -- 93 % -- --   11/25/23 0500 127/58 -- -- 67 -- 97 % -- --   11/25/23 0415 128/65 -- -- 64 15 96 % -- --   11/25/23 0400 129/55 -- -- 68 16 96 % -- --   11/25/23 0345 128/57 -- -- 70 (!) 9 95 % -- --   11/25/23 0330 127/60 -- -- 70 20 96 % -- --   11/25/23 0315 127/74 -- -- 67 10 97 % -- --   11/25/23 0300 128/49 -- -- 71 17 97 % -- --   11/25/23 0240 -- 98.2  F (36.8  C) Oral -- -- -- -- --   11/25/23 0230 (!) 141/75 -- -- 76 26 99 % -- --   11/25/23 0213 -- -- -- -- -- -- 1.727 m (5' 8\") 101.6 kg (224 lb)     GENERAL APPEARANCE: Alert and oriented x 3, no acute distress  FACE: normal " facies  EYES: Pupils are equal  HENT: normal external exam  NECK: no adenopathy or asymmetry  RESP: normal respiratory effort; clear breath sounds bilaterally  CV: regular rate and rhythm; no significant murmurs, gallops or rubs  ABD: soft, moderately obese, no tenderness; no rebound or guarding; bowel sounds are normal  MS: no gross deformities noted; normal muscle tone.  EXT: No calf tenderness or pitting edema  SKIN: no worrisome rash  NEURO: no facial droop; no focal deficits, speech is normal  PSYCH: normal mood and affect      Available Lab/Imaging Results     Results for orders placed or performed during the hospital encounter of 11/25/23 (from the past 24 hour(s))   UA with Microscopic reflex to Culture    Specimen: Urine, Clean Catch   Result Value Ref Range    Color Urine Straw Colorless, Straw, Light Yellow, Yellow    Appearance Urine Clear Clear    Glucose Urine Negative Negative mg/dL    Bilirubin Urine Negative Negative    Ketones Urine Negative Negative mg/dL    Specific Gravity Urine 1.004 1.003 - 1.035    Blood Urine Negative Negative    pH Urine 6.0 5.0 - 7.0    Protein Albumin Urine Negative Negative mg/dL    Urobilinogen Urine Normal Normal, 2.0 mg/dL    Nitrite Urine Negative Negative    Leukocyte Esterase Urine Negative Negative    Mucus Urine Present (A) None Seen /LPF    RBC Urine 0 <=2 /HPF    WBC Urine <1 <=5 /HPF    Narrative    Urine Culture not indicated   Comprehensive metabolic panel   Result Value Ref Range    Sodium 130 (L) 135 - 145 mmol/L    Potassium 3.7 3.4 - 5.3 mmol/L    Carbon Dioxide (CO2) 20 (L) 22 - 29 mmol/L    Anion Gap 16 (H) 7 - 15 mmol/L    Urea Nitrogen 7.7 (L) 8.0 - 23.0 mg/dL    Creatinine 0.65 0.51 - 0.95 mg/dL    GFR Estimate >90 >60 mL/min/1.73m2    Calcium 9.2 8.8 - 10.2 mg/dL    Chloride 94 (L) 98 - 107 mmol/L    Glucose 92 70 - 99 mg/dL    Alkaline Phosphatase 92 40 - 150 U/L    AST 28 0 - 45 U/L    ALT 8 0 - 50 U/L    Protein Total 7.1 6.4 - 8.3 g/dL     Albumin 4.2 3.5 - 5.2 g/dL    Bilirubin Total 0.2 <=1.2 mg/dL   Troponin T, High Sensitivity   Result Value Ref Range    Troponin T, High Sensitivity <6 <=14 ng/L   D dimer quantitative   Result Value Ref Range    D-Dimer Quantitative 3.69 (H) 0.00 - 0.50 ug/mL FEU    Narrative    This D-dimer assay is intended for use in conjunction with a clinical pretest probability assessment model to exclude pulmonary embolism (PE) and deep venous thrombosis (DVT) in outpatients suspected of PE or DVT. The cut-off value is 0.50 ug/mL FEU.    For patients 50 years of age or older, the application of age-adjusted cut-off values for D-Dimer may increase the specificity without significant effect on sensitivity. The literature suggested calculation age adjusted cut-off in ug/L = age in years x 10 ug/L. The results in this laboratory are reported as ug/mL rather than ug/L. The calculation for age adjusted cut off in ug/mL= age in years x 0.01 ug/mL. For example, the cut off for a 76 year old male is 76 x 0.01 ug/mL = 0.76 ug/mL (760 ug/L).    M Herlindahicourtney et al. Age adjusted D-dimer cut-off levels to rule out pulmonary embolism: The ADJUST-PE Study. KEMAL 2014;311:5775-5822.; HJ Jyothi et al. Diagnostic accuracy of conventional or age adjusted D-dimer cutoff values in older patients with suspected venous thromboembolism. Systemic review and meta-analysis. BMJ 2013:346:f2492.   XR Chest Port 1 View    Narrative    EXAM: XR CHEST PORT 1 VIEW  LOCATION: [facility]  DATE/TIME: 11/25/2023 3:05 AM CST    INDICATION: Left-sided chest, abdominal pain.  COMPARISON: Chest x-ray on 05/10/2010      Impression    IMPRESSION: Single AP view of the chest was obtained. Cardiomediastinal silhouette is within normal limits. Mild left basilar pulmonary opacities, likely atelectasis. No significant pleural effusion or pneumothorax.   CBC with platelets differential    Narrative    The following orders were created for  panel order CBC with platelets differential.  Procedure                               Abnormality         Status                     ---------                               -----------         ------                     CBC with platelets and d...[[id number]]  Abnormal            Final result                 Please view results for these tests on the individual orders.   CBC with platelets and differential   Result Value Ref Range    WBC Count 5.0 4.0 - 11.0 10e3/uL    RBC Count 4.12 3.80 - 5.20 10e6/uL    Hemoglobin 10.2 (L) 11.7 - 15.7 g/dL    Hematocrit 32.9 (L) 35.0 - 47.0 %    MCV 80 78 - 100 fL    MCH 24.8 (L) 26.5 - 33.0 pg    MCHC 31.0 (L) 31.5 - 36.5 g/dL    RDW 15.7 (H) 10.0 - 15.0 %    Platelet Count 300 150 - 450 10e3/uL    % Neutrophils 52 %    % Lymphocytes 37 %    % Monocytes 7 %    % Eosinophils 3 %    % Basophils 1 %    % Immature Granulocytes 0 %    NRBCs per 100 WBC 0 <1 /100    Absolute Neutrophils 2.6 1.6 - 8.3 10e3/uL    Absolute Lymphocytes 1.9 0.8 - 5.3 10e3/uL    Absolute Monocytes 0.4 0.0 - 1.3 10e3/uL    Absolute Eosinophils 0.1 0.0 - 0.7 10e3/uL    Absolute Basophils 0.1 0.0 - 0.2 10e3/uL    Absolute Immature Granulocytes 0.0 <=0.4 10e3/uL    Absolute NRBCs 0.0 10e3/uL   CT Chest Pulmonary Embolism w Contrast    Narrative    EXAM: CT CHEST PULMONARY EMBOLISM W CONTRAST  LOCATION: [facility]  DATE: 11/25/2023    INDICATION: left sided chest and neck pain; high d dimer; left sided chest and neck pain; high d dimer  COMPARISON: Portable chest radiograph from 11/25/2023.  TECHNIQUE: CT chest pulmonary angiogram during arterial phase injection of IV contrast. Multiplanar reformats and MIP reconstructions were performed. Dose reduction techniques were used.   CONTRAST: Isovue 370, 75mL    FINDINGS:  ANGIOGRAM CHEST: Pulmonary arteries are normal caliber and negative for pulmonary emboli. Thoracic aorta is negative for dissection. No CT evidence of right heart  "strain.    LUNGS AND PLEURA: Mild reticulation of the subpleural interstitium throughout both lungs. No honeycombing. No consolidation. No pneumothorax or pleural effusion.    MEDIASTINUM/AXILLAE: Normal heart size. No pericardial effusion. Multiple borderline prominent mediastinal and hilar lymph nodes.    CORONARY ARTERY CALCIFICATION: Mild.    UPPER ABDOMEN: Postoperative changes of the stomach. Absent gallbladder.    MUSCULOSKELETAL: Old left lateral eighth and ninth fractures. There is a mild anterior wedge compression fracture deformity at the T6 vertebral body.      Impression    IMPRESSION:  1.  Negative for pulmonary embolism.    2.  Mild reticulation of the subpleural interstitium which could reflect an early/mild component of chronic interstitial lung disease. No honeycombing.    3.  Mild coronary artery disease.       EKG reviewed by me: Normal sinus rhythm with heart rate of 73.  IN interval of 158 ms, QT interval 460 ms, cures duration of 114 ms.  Incomplete right bundle branch block.  No acute ST-T wave changes.             Impression     Final diagnoses:   Atypical chest pain   Left sided abdominal pain         ED Course & Medical Decision Making   Kristine Cox is a 63 year old female who presented to the emergency department by ambulance with severe left lateral chest pain radiating into the neck and left shoulder.  Pain initially started at 5:30 PM with left-sided abdominal pain from the lower ribs down.  This occurred after she ate some mac & cheese and she has a history of lactose intolerance.  She took some Tums shortly afterwards.  At around 10:30 PM when she was going to bed, the pain radiated along a couple of \"strips\" into the left lateral chest, neck, and then into the left shoulder.  Pain lasted for a while.  EMS administered aspirin and nitroglycerin, and the patient's pain resolved either with medication or spontaneously.  By the time the patient arrived in the emergency department, " the pain was completely resolved.    Vital signs reveal a temp of 98.2, blood pressure 141/75, subsequent blood pressure of 128/65, heart rate of 76, respiratory rate of 25 with 99% oxygen saturation.  On exam, patient has clear breath sounds that are symmetrical.  No adventitious breath sounds.  Heart sounds are normal.  Mild epigastric tenderness.  No reproducible abdominal or chest tenderness.  EKG reveals incomplete right bundle branch block but no acute ischemic changes.  Laboratory workup reveals a white blood count of 5.0, hemoglobin of 10.2 (similar to previous levels) and platelet count of 300,000.  Comprehensive metabolic panel is normal except for sodium of 130, carbon dioxide of 20, BUN of 7.7, creatinine of 0.65, with normal glucose and liver enzymes.  Troponin is less than 6.  D-dimer is 3.69.  Given the extremely high D-dimer level and the unusual left-sided chest pain, we proceeded with CT scanning of the chest with contrast.  Patient had reported a contrast dye allergy resulting in flushing 1 to 2 years ago, but she had hives when she had contrast dye 20 years ago.    Patient was premedicated with Benadryl and Solu-Medrol.  She completed a CT scan with contrast of the chest.  She had slight infiltration of the contrast dye into the left antecubital space.  This was monitored for short time afterwards.  Patient reports that the contrast was already being absorbed.  She did not have any adverse reactions.  Results of the CT scan was negative for pulmonary embolism.  There is mild reticulation of the subpleural interstitium which could reflect an early component of chronic interstitial lung disease.  There is no honeycombing.  Patient was informed of this finding.  Her younger sister, age 55 has stage IV interstitial lung disease.  We do not know the exact cause for her left-sided atypical chest and abdominal pain.  Patient will continue close monitoring of symptoms and return to the ED with any  recurrence that does not quickly resolve.  Patient expressed understanding and agreement with discharge instructions below.  Patient was urged to quit smoking.          Written after-visit summary and instructions were given at the time of discharge.        Discharge Instructions:   We did not find a worrisome cause for your left-sided abdominal and chest pain.  Your D-dimer level is extremely high, but we did not find any signs of blood clots in your lungs.  The CT scan of your chest reveals suspicion for early interstitial lung disease but no honeycombing.  There were incidental findings of old left eighth and ninth rib fractures and a mild compression fracture of T6 vertebrae.  Continue to monitor for further symptoms.  Take steps to quit smoking.  Return to the emergency department at any time if he developed acute recurrence of this atypical pain.       Disclaimer: This note consists of words and symbols derived from keyboarding and dictation using voice recognition software.  As a result, there may be errors that have gone undetected.  Please consider this when interpreting information found in this note.       Luis Fernando Carr MD  11/25/23 0587

## 2023-11-25 NOTE — DISCHARGE INSTRUCTIONS
We did not find a worrisome cause for your left-sided abdominal and chest pain.  Your D-dimer level is extremely high, but we did not find any signs of blood clots in your lungs.  The CT scan of your chest reveals suspicion for early interstitial lung disease but no honeycombing.  There were incidental findings of old left eighth and ninth rib fractures and a mild compression fracture of T6 vertebrae.  Continue to monitor for further symptoms.  Take steps to quit smoking.  Return to the emergency department at any time if he developed acute recurrence of this atypical pain.

## 2023-11-25 NOTE — ED NOTES
Pt reassessed. Swelling has decreased significantly. Minimal bruising. Pt declines need for additional heat or other intervention.

## 2023-11-25 NOTE — ED TRIAGE NOTES
"Pt here for left sided abdominal and chest pain. Onset 1730 yesterday, progressively worse going up into shoulder and chest just prior to calling EMS. Pain 9/10, 2/10 by time EMS got there, pain free after 324 ASA. Left AC IV saline locked. No other interventions. Still pain free on arrival, but states \"I don't feel right. I feel weird. It feels like it's my carotid.\"     Triage Assessment (Adult)       Row Name 11/25/23 0210          Triage Assessment    Airway WDL WDL        Respiratory WDL    Respiratory WDL WDL        Skin Circulation/Temperature WDL    Skin Circulation/Temperature WDL WDL        Cardiac WDL    Cardiac WDL WDL        Peripheral/Neurovascular WDL    Peripheral Neurovascular WDL WDL        Cognitive/Neuro/Behavioral WDL    Cognitive/Neuro/Behavioral WDL WDL                     "

## 2023-11-25 NOTE — ED NOTES
Bed: ED10  Expected date:   Expected time:   Means of arrival:   Comments:  [facility]  62 y/o F  Abdominal/chest pain

## 2023-11-30 DIAGNOSIS — M19.90 ARTHRITIS: ICD-10-CM

## 2023-11-30 RX ORDER — CELECOXIB 100 MG/1
100 CAPSULE ORAL 2 TIMES DAILY
Qty: 60 CAPSULE | Refills: 4 | Status: SHIPPED | OUTPATIENT
Start: 2023-11-30 | End: 2024-03-06

## 2024-02-21 DIAGNOSIS — I10 HTN, GOAL BELOW 140/90: ICD-10-CM

## 2024-02-21 RX ORDER — LISINOPRIL 10 MG/1
10 TABLET ORAL DAILY
Qty: 90 TABLET | Refills: 0 | Status: SHIPPED | OUTPATIENT
Start: 2024-02-21 | End: 2024-03-06

## 2024-02-29 DIAGNOSIS — R10.13 EPIGASTRIC PAIN: ICD-10-CM

## 2024-03-01 RX ORDER — FAMOTIDINE 20 MG/1
20 TABLET, FILM COATED ORAL 2 TIMES DAILY
Qty: 180 TABLET | Refills: 1 | Status: SHIPPED | OUTPATIENT
Start: 2024-03-01 | End: 2024-08-30

## 2024-03-06 ENCOUNTER — OFFICE VISIT (OUTPATIENT)
Dept: FAMILY MEDICINE | Facility: CLINIC | Age: 64
End: 2024-03-06
Payer: COMMERCIAL

## 2024-03-06 VITALS
OXYGEN SATURATION: 100 % | HEART RATE: 87 BPM | BODY MASS INDEX: 33.65 KG/M2 | SYSTOLIC BLOOD PRESSURE: 124 MMHG | HEIGHT: 68 IN | WEIGHT: 222 LBS | RESPIRATION RATE: 14 BRPM | DIASTOLIC BLOOD PRESSURE: 74 MMHG | TEMPERATURE: 97.4 F

## 2024-03-06 DIAGNOSIS — R60.9 SWELLING: ICD-10-CM

## 2024-03-06 DIAGNOSIS — I10 HTN, GOAL BELOW 140/90: ICD-10-CM

## 2024-03-06 DIAGNOSIS — M19.90 ARTHRITIS: ICD-10-CM

## 2024-03-06 DIAGNOSIS — Z13.29 SCREENING FOR THYROID DISORDER: ICD-10-CM

## 2024-03-06 DIAGNOSIS — Z12.31 VISIT FOR SCREENING MAMMOGRAM: ICD-10-CM

## 2024-03-06 DIAGNOSIS — K60.2 ANAL FISSURE: ICD-10-CM

## 2024-03-06 DIAGNOSIS — Z13.220 LIPID SCREENING: ICD-10-CM

## 2024-03-06 DIAGNOSIS — Z00.00 ROUTINE GENERAL MEDICAL EXAMINATION AT A HEALTH CARE FACILITY: Primary | ICD-10-CM

## 2024-03-06 DIAGNOSIS — Z98.84 S/P GASTRIC BYPASS: ICD-10-CM

## 2024-03-06 LAB
ALBUMIN SERPL BCG-MCNC: 4.2 G/DL (ref 3.5–5.2)
ALP SERPL-CCNC: 90 U/L (ref 40–150)
ALT SERPL W P-5'-P-CCNC: 8 U/L (ref 0–50)
ANION GAP SERPL CALCULATED.3IONS-SCNC: 13 MMOL/L (ref 7–15)
AST SERPL W P-5'-P-CCNC: 22 U/L (ref 0–45)
BILIRUB SERPL-MCNC: 0.3 MG/DL
BUN SERPL-MCNC: 7.3 MG/DL (ref 8–23)
CALCIUM SERPL-MCNC: 9.9 MG/DL (ref 8.8–10.2)
CHLORIDE SERPL-SCNC: 100 MMOL/L (ref 98–107)
CHOLEST SERPL-MCNC: 215 MG/DL
CREAT SERPL-MCNC: 0.72 MG/DL (ref 0.51–0.95)
CREAT UR-MCNC: 33.6 MG/DL
DEPRECATED HCO3 PLAS-SCNC: 22 MMOL/L (ref 22–29)
EGFRCR SERPLBLD CKD-EPI 2021: >90 ML/MIN/1.73M2
ERYTHROCYTE [DISTWIDTH] IN BLOOD BY AUTOMATED COUNT: 15.3 % (ref 10–15)
FASTING STATUS PATIENT QL REPORTED: NO
GLUCOSE SERPL-MCNC: 90 MG/DL (ref 70–99)
HCT VFR BLD AUTO: 36.6 % (ref 35–47)
HDLC SERPL-MCNC: 122 MG/DL
HGB BLD-MCNC: 11.7 G/DL (ref 11.7–15.7)
LDLC SERPL CALC-MCNC: 74 MG/DL
MCH RBC QN AUTO: 25.8 PG (ref 26.5–33)
MCHC RBC AUTO-ENTMCNC: 32 G/DL (ref 31.5–36.5)
MCV RBC AUTO: 81 FL (ref 78–100)
MICROALBUMIN UR-MCNC: <12 MG/L
MICROALBUMIN/CREAT UR: NORMAL MG/G{CREAT}
NONHDLC SERPL-MCNC: 93 MG/DL
PLATELET # BLD AUTO: 331 10E3/UL (ref 150–450)
POTASSIUM SERPL-SCNC: 4.2 MMOL/L (ref 3.4–5.3)
PROT SERPL-MCNC: 7.4 G/DL (ref 6.4–8.3)
RBC # BLD AUTO: 4.54 10E6/UL (ref 3.8–5.2)
SODIUM SERPL-SCNC: 135 MMOL/L (ref 135–145)
TRIGL SERPL-MCNC: 93 MG/DL
TSH SERPL DL<=0.005 MIU/L-ACNC: 0.74 UIU/ML (ref 0.3–4.2)
WBC # BLD AUTO: 5.3 10E3/UL (ref 4–11)

## 2024-03-06 PROCEDURE — 80053 COMPREHEN METABOLIC PANEL: CPT | Performed by: PHYSICIAN ASSISTANT

## 2024-03-06 PROCEDURE — 36415 COLL VENOUS BLD VENIPUNCTURE: CPT | Performed by: PHYSICIAN ASSISTANT

## 2024-03-06 PROCEDURE — 85027 COMPLETE CBC AUTOMATED: CPT | Performed by: PHYSICIAN ASSISTANT

## 2024-03-06 PROCEDURE — 82043 UR ALBUMIN QUANTITATIVE: CPT | Performed by: PHYSICIAN ASSISTANT

## 2024-03-06 PROCEDURE — 82607 VITAMIN B-12: CPT | Performed by: PHYSICIAN ASSISTANT

## 2024-03-06 PROCEDURE — 99214 OFFICE O/P EST MOD 30 MIN: CPT | Mod: 25 | Performed by: PHYSICIAN ASSISTANT

## 2024-03-06 PROCEDURE — 82306 VITAMIN D 25 HYDROXY: CPT | Performed by: PHYSICIAN ASSISTANT

## 2024-03-06 PROCEDURE — 90471 IMMUNIZATION ADMIN: CPT | Performed by: PHYSICIAN ASSISTANT

## 2024-03-06 PROCEDURE — 82570 ASSAY OF URINE CREATININE: CPT | Performed by: PHYSICIAN ASSISTANT

## 2024-03-06 PROCEDURE — 80061 LIPID PANEL: CPT | Performed by: PHYSICIAN ASSISTANT

## 2024-03-06 PROCEDURE — 84443 ASSAY THYROID STIM HORMONE: CPT | Performed by: PHYSICIAN ASSISTANT

## 2024-03-06 PROCEDURE — 99396 PREV VISIT EST AGE 40-64: CPT | Mod: 25 | Performed by: PHYSICIAN ASSISTANT

## 2024-03-06 PROCEDURE — 90715 TDAP VACCINE 7 YRS/> IM: CPT | Performed by: PHYSICIAN ASSISTANT

## 2024-03-06 RX ORDER — CELECOXIB 100 MG/1
100 CAPSULE ORAL 2 TIMES DAILY
Qty: 60 CAPSULE | Refills: 4 | Status: SHIPPED | OUTPATIENT
Start: 2024-03-06 | End: 2024-08-01

## 2024-03-06 RX ORDER — LISINOPRIL 10 MG/1
10 TABLET ORAL DAILY
Qty: 90 TABLET | Refills: 3 | Status: SHIPPED | OUTPATIENT
Start: 2024-03-06

## 2024-03-06 RX ORDER — HYDROCORTISONE 25 MG/G
CREAM TOPICAL 2 TIMES DAILY PRN
Qty: 30 G | Refills: 4 | Status: SHIPPED | OUTPATIENT
Start: 2024-03-06

## 2024-03-06 RX ORDER — HYDROCHLOROTHIAZIDE 12.5 MG/1
TABLET ORAL
Qty: 90 TABLET | Refills: 3 | Status: SHIPPED | OUTPATIENT
Start: 2024-03-06

## 2024-03-06 SDOH — HEALTH STABILITY: PHYSICAL HEALTH: ON AVERAGE, HOW MANY MINUTES DO YOU ENGAGE IN EXERCISE AT THIS LEVEL?: 0 MIN

## 2024-03-06 SDOH — HEALTH STABILITY: PHYSICAL HEALTH: ON AVERAGE, HOW MANY DAYS PER WEEK DO YOU ENGAGE IN MODERATE TO STRENUOUS EXERCISE (LIKE A BRISK WALK)?: 0 DAYS

## 2024-03-06 ASSESSMENT — PAIN SCALES - GENERAL: PAINLEVEL: NO PAIN (0)

## 2024-03-06 ASSESSMENT — PATIENT HEALTH QUESTIONNAIRE - PHQ9
SUM OF ALL RESPONSES TO PHQ QUESTIONS 1-9: 7
SUM OF ALL RESPONSES TO PHQ QUESTIONS 1-9: 7
10. IF YOU CHECKED OFF ANY PROBLEMS, HOW DIFFICULT HAVE THESE PROBLEMS MADE IT FOR YOU TO DO YOUR WORK, TAKE CARE OF THINGS AT HOME, OR GET ALONG WITH OTHER PEOPLE: SOMEWHAT DIFFICULT

## 2024-03-06 ASSESSMENT — SOCIAL DETERMINANTS OF HEALTH (SDOH): HOW OFTEN DO YOU GET TOGETHER WITH FRIENDS OR RELATIVES?: ONCE A WEEK

## 2024-03-06 NOTE — PATIENT INSTRUCTIONS
Preventive Care Advice   This is general advice given by our system to help you stay healthy. However, your care team may have specific advice just for you. Please talk to your care team about your preventive care needs.  Nutrition  Eat 5 or more servings of fruits and vegetables each day.  Try wheat bread, brown rice and whole grain pasta (instead of white bread, rice, and pasta).  Get enough calcium and vitamin D. Check the label on foods and aim for 100% of the RDA (recommended daily allowance).  Lifestyle  Exercise at least 150 minutes each week   (30 minutes a day, 5 days a week).  Do muscle strengthening activities 2 days a week. These help control your weight and prevent disease.  No smoking.  Wear sunscreen to prevent skin cancer.  Have a dental exam and cleaning every 6 months.  Yearly exams  See your health care team every year to talk about:  Any changes in your health.  Any medicines your care team has prescribed.  Preventive care, family planning, and ways to prevent chronic diseases.  Shots (vaccines)   HPV shots (up to age 26), if you've never had them before.  Hepatitis B shots (up to age 59), if you've never had them before.  COVID-19 shot: Get this shot when it's due.  Flu shot: Get a flu shot every year.  Tetanus shot: Get a tetanus shot every 10 years.  Pneumococcal, hepatitis A, and RSV shots: Ask your care team if you need these based on your risk.  Shingles shot (for age 50 and up).  General health tests  Diabetes screening:  Starting at age 35, Get screened for diabetes at least every 3 years.  If you are younger than age 35, ask your care team if you should be screened for diabetes.  Cholesterol test: At age 39, start having a cholesterol test every 5 years, or more often if advised.  Bone density scan (DEXA): At age 50, ask your care team if you should have this scan for osteoporosis (brittle bones).  Hepatitis C: Get tested at least once in your life.  STIs (sexually transmitted  infections)  Before age 24: Ask your care team if you should be screened for STIs.  After age 24: Get screened for STIs if you're at risk. You are at risk for STIs (including HIV) if:  You are sexually active with more than one person.  You don't use condoms every time.  You or a partner was diagnosed with a sexually transmitted infection.  If you are at risk for HIV, ask about PrEP medicine to prevent HIV.  Get tested for HIV at least once in your life, whether you are at risk for HIV or not.  Cancer screening tests  Cervical cancer screening: If you have a cervix, begin getting regular cervical cancer screening tests at age 21. Most people who have regular screenings with normal results can stop after age 65. Talk about this with your provider.  Breast cancer scan (mammogram): If you've ever had breasts, begin having regular mammograms starting at age 40. This is a scan to check for breast cancer.  Colon cancer screening: It is important to start screening for colon cancer at age 45.  Have a colonoscopy test every 10 years (or more often if you're at risk) Or, ask your provider about stool tests like a FIT test every year or Cologuard test every 3 years.  To learn more about your testing options, visit: [email or web address].  For help making a decision, visit: https://bit.ly/iq52876.  Prostate cancer screening test: If you have a prostate and are age 55 to 69, ask your provider if you would benefit from a yearly prostate cancer screening test.  Lung cancer screening: If you are a current or former smoker age 50 to 80, ask your care team if ongoing lung cancer screenings are right for you.  For informational purposes only. Not to replace the advice of your health care provider. Copyright   2023 [employer]. All rights reserved. Clinically reviewed by the [facility]. Genter[employer] 338155 - REV 01/24.    Learning About Stress  What is stress?     Stress is your  body's response to a hard situation. Your body can have a physical, emotional, or mental response. Stress is a fact of life for most people, and it affects everyone differently. What causes stress for you may not be stressful for someone else.  A lot of things can cause stress. You may feel stress when you go on a job interview, take a test, or run a race. This kind of short-term stress is normal and even useful. It can help you if you need to work hard or react quickly. For example, stress can help you finish an important job on time.  Long-term stress is caused by ongoing stressful situations or events. Examples of long-term stress include long-term health problems, ongoing problems at work, or conflicts in your family. Long-term stress can harm your health.  How does stress affect your health?  When you are stressed, your body responds as though you are in danger. It makes hormones that speed up your heart, make you breathe faster, and give you a burst of energy. This is called the fight-or-flight stress response. If the stress is over quickly, your body goes back to normal and no harm is done.  But if stress happens too often or lasts too long, it can have bad effects. Long-term stress can make you more likely to get sick, and it can make symptoms of some diseases worse. If you tense up when you are stressed, you may develop neck, shoulder, or low back pain. Stress is linked to high blood pressure and heart disease.  Stress also harms your emotional health. It can make you cohen, tense, or depressed. Your relationships may suffer, and you may not do well at work or school.  What can you do to manage stress?  You can try these things to help manage stress:   Do something active. Exercise or activity can help reduce stress. Walking is a great way to get started. Even everyday activities such as housecleaning or yard work can help.  Try yoga or tia chi. These techniques combine exercise and meditation. You may need  some training at first to learn them.  Do something you enjoy. For example, listen to music or go to a movie. Practice your hobby or do volunteer work.  Meditate. This can help you relax, because you are not worrying about what happened before or what may happen in the future.  Do guided imagery. Imagine yourself in any setting that helps you feel calm. You can use online videos, books, or a teacher to guide you.  Do breathing exercises. For example:  From a standing position, bend forward from the waist with your knees slightly bent. Let your arms dangle close to the floor.  Breathe in slowly and deeply as you return to a standing position. Roll up slowly and lift your head last.  Hold your breath for just a few seconds in the standing position.  Breathe out slowly and bend forward from the waist.  Let your feelings out. Talk, laugh, cry, and express anger when you need to. Talking with supportive friends or family, a counselor, or a gabe leader about your feelings is a healthy way to relieve stress. Avoid discussing your feelings with people who make you feel worse.  Write. It may help to write about things that are bothering you. This helps you find out how much stress you feel and what is causing it. When you know this, you can find better ways to cope.  What can you do to prevent stress?  You might try some of these things to help prevent stress:  Manage your time. This helps you find time to do the things you want and need to do.  Get enough sleep. Your body recovers from the stresses of the day while you are sleeping.  Get support. Your family, friends, and community can make a difference in how you experience stress.  Limit your news feed. Avoid or limit time on social media or news that may make you feel stressed.  Do something active. Exercise or activity can help reduce stress. Walking is a great way to get started.  Where can you learn more?  Go to https://www.healthwise.net/patiented  Enter N032 in the  "search box to learn more about \"Learning About Stress.\"  Current as of: February 26, 2023               Content Version: 13.8    5344-0225 [employer]   Care instructions adapted under license by your healthcare professional. If you have questions about a medical condition or this instruction, always ask your healthcare professional. [employer] disclaims any warranty or liability for your use of this information.      Learning About Depression Screening  What is depression screening?  Depression screening is a way to see if you have depression symptoms. It may be done by a doctor or counselor. It's often part of a routine checkup. That's because your mental health is just as important as your physical health.  Depression is a mental health condition that affects how you feel, think, and act. You may:  Have less energy.  Lose interest in your daily activities.  Feel sad and grouchy for a long time.  Depression is very common. It affects people of all ages.  Many things can lead to depression. Some people become depressed after they have a stroke or find out they have a major illness like cancer or heart disease. The death of a loved one or a breakup may lead to depression. It can run in families. Most experts believe that a combination of inherited genes and stressful life events can cause it.  What happens during screening?  You may be asked to fill out a form about your depression symptoms. You and the doctor will discuss your answers. The doctor may ask you more questions to learn more about how you think, act, and feel.  What happens after screening?  If you have symptoms of depression, your doctor will talk to you about your options.  Doctors usually treat depression with medicines or counseling. Often, combining the two works best. Many people don't get help because they think that they'll get over the depression on their own. But people with depression may not get better unless they " "get treatment.  The cause of depression is not well understood. There may be many factors involved. But if you have depression, it's not your fault.  A serious symptom of depression is thinking about death or suicide. If you or someone you care about talks about this or about feeling hopeless, get help right away.  It's important to know that depression can be treated. Medicine, counseling, and self-care may help.  Where can you learn more?  Go to https://www.b5media.net/patiented  Enter T185 in the search box to learn more about \"Learning About Depression Screening.\"  Current as of: June 25, 2023               Content Version: 13.8    2400-2259 [employer].   Care instructions adapted under license by your healthcare professional. If you have questions about a medical condition or this instruction, always ask your healthcare professional. [employer] disclaims any warranty or liability for your use of this information.      Learning About Alcohol Use Disorder  What is alcohol use disorder?  Alcohol use disorder means that a person drinks alcohol even though it causes harm to themselves or others. It can range from mild to severe. The more symptoms of this disorder you have, the more severe it may be. People who have it may find it hard to control their use of alcohol.  People who have this disorder may argue with others about how much they're drinking. Their job may be affected because of drinking. They may drink when it's dangerous or illegal, such as when they drive. They also may have a strong need, or craving, to drink. They may feel like they must drink just to get by. Their drinking may increase their risk of getting hurt or being in a car crash.  Over time, drinking too much alcohol may cause health problems. These may include high blood pressure, liver problems, or problems with digestion.  What are the symptoms?  Maybe you've wondered about your alcohol habits or how to tell if " your drinking is becoming a problem.  Here are some of the symptoms of alcohol use disorder. You may have it if you have two or more of the following symptoms:  You drink larger amounts of alcohol than you ever meant to. Or you've been drinking for a longer time than you ever meant to.  You can't cut down or control your use. Or you constantly wish you could cut down.  You spend a lot of time getting or drinking alcohol or recovering from its effects.  You have strong cravings for alcohol.  You can no longer do your main jobs at work, at school, or at home.  You keep drinking alcohol, even though your use hurts your relationships.  You have stopped doing important activities because of your alcohol use.  You drink alcohol in situations where doing so is dangerous.  You keep drinking alcohol even though you know it's causing health problems.  You need more and more alcohol to get the same effect, or you get less effect from the same amount over time. This is called tolerance.  You have uncomfortable symptoms when you stop drinking alcohol or use less. This is called withdrawal.  Alcohol use disorder can range from mild to severe. The more symptoms you have, the more severe the disorder may be.  You might not realize that your drinking is a problem. You might not drink large amounts when you drink. Or you might go for days or weeks between drinking episodes. But even if you don't drink very often, your drinking could still be harmful and put you at risk.  How is alcohol use disorder treated?  Getting help is up to you. But you don't have to do it alone. There are many people and kinds of treatments that can help.  Treatment for alcohol use disorder can include:  Group therapy, one or more types of counseling, and alcohol education.  Medicines that help to:  Reduce withdrawal symptoms and help you safely stop drinking.  Reduce cravings for alcohol.  Support groups. These groups include Alcoholics Anonymous and [employer]  "Recovery (Self-Management and Recovery Training).  Some people are able to stop or cut back on drinking with help from a counselor. People who have moderate to severe alcohol use disorder may need medical treatment. They may need to stay in a hospital or treatment center.  You may have a treatment team to help you. This team may include a psychologist or psychiatrist, counselors, doctors, social workers, nurses, and a . A  helps plan and manage your treatment.  Follow-up care is a key part of your treatment and safety. Be sure to make and go to all appointments, and call your doctor if you are having problems. It's also a good idea to know your test results and keep a list of the medicines you take.  Where can you learn more?  Go to https://www.Freedom2.net/patiented  Enter H758 in the search box to learn more about \"Learning About Alcohol Use Disorder.\"  Current as of: March 21, 2023               Content Version: 13.8    4671-5011 [employer].   Care instructions adapted under license by your healthcare professional. If you have questions about a medical condition or this instruction, always ask your healthcare professional. [employer] disclaims any warranty or liability for your use of this information.      Substance Use Disorder: Care Instructions  Overview     You can improve your life and health by stopping your use of alcohol or drugs. When you don't drink or use drugs, you may feel and sleep better. You may get along better with your family, friends, and coworkers. There are medicines and programs that can help with substance use disorder.  How can you care for yourself at home?  Here are some ways to help you stay sober and prevent relapse.  If you have been given medicine to help keep you sober or reduce your cravings, be sure to take it exactly as prescribed.  Talk to your doctor about programs that can help you stop using drugs or drinking " alcohol.  Do not keep alcohol or drugs in your home.  Plan ahead. Think about what you'll say if other people ask you to drink or use drugs. Try not to spend time with people who drink or use drugs.  Use the time and money spent on drinking or drugs to do something that's important to you.  Preventing a relapse  Have a plan to deal with relapse. Learn to recognize changes in your thinking that lead you to drink or use drugs. Get help before you start to drink or use drugs again.  Try to stay away from situations, friends, or places that may lead you to drink or use drugs.  If you feel the need to drink alcohol or use drugs again, seek help right away. Call a trusted friend or family member. Some people get support from organizations such as Narcotics Anonymous or [employer] or from treatment facilities.  If you relapse, get help as soon as you can. Some people make a plan with another person that outlines what they want that person to do for them if they relapse. The plan usually includes how to handle the relapse and who to notify in case of relapse.  Don't give up. Remember that a relapse doesn't mean that you have failed. Use the experience to learn the triggers that lead you to drink or use drugs. Then quit again. Recovery is a lifelong process. Many people have several relapses before they are able to quit for good.  Follow-up care is a key part of your treatment and safety. Be sure to make and go to all appointments, and call your doctor if you are having problems. It's also a good idea to know your test results and keep a list of the medicines you take.  When should you call for help?   Call 911  anytime you think you may need emergency care. For example, call if you or someone else:    Has overdosed or has withdrawal signs. Be sure to tell the emergency workers that you are or someone else is using or trying to quit using drugs. Overdose or withdrawal signs may include:  Losing  "consciousness.  Seizure.  Seeing or hearing things that aren't there (hallucinations).     Is thinking or talking about suicide or harming others.   Where to get help 24 hours a day, 7 days a week   If you or someone you know talks about suicide, self-harm, a mental health crisis, a substance use crisis, or any other kind of emotional distress, get help right away. You can:    Call the Suicide and Crisis Lifeline at 988.     Call 7-878-173-TALK (1-485.787.4996).     Text HOME to 317726 to access the Crisis Text Line.   Consider saving these numbers in your phone.  Go to [employer] for more information or to chat online.  Call your doctor now or seek immediate medical care if:    You are having withdrawal symptoms. These may include nausea or vomiting, sweating, shakiness, and anxiety.   Watch closely for changes in your health, and be sure to contact your doctor if:    You have a relapse.     You need more help or support to stop.   Where can you learn more?  Go to https://www.Greenstack.net/patiented  Enter H573 in the search box to learn more about \"Substance Use Disorder: Care Instructions.\"  Current as of: March 21, 2023               Content Version: 13.8    7105-8292 [employer].   Care instructions adapted under license by your healthcare professional. If you have questions about a medical condition or this instruction, always ask your healthcare professional. [employer] disclaims any warranty or liability for your use of this information.      "

## 2024-03-06 NOTE — NURSING NOTE
Prior to immunization administration, verified patients identity using patient s name and date of birth. Please see Immunization Activity for additional information.     Screening Questionnaire for Adult Immunization    Are you sick today?   No   Do you have allergies to medications, food, a vaccine component or latex?   Yes   Have you ever had a serious reaction after receiving a vaccination?   No   Do you have a long-term health problem with heart, lung, kidney, or metabolic disease (e.g., diabetes), asthma, a blood disorder, no spleen, complement component deficiency, a cochlear implant, or a spinal fluid leak?  Are you on long-term aspirin therapy?   No   Do you have cancer, leukemia, HIV/AIDS, or any other immune system problem?   No   Do you have a parent, brother, or sister with an immune system problem?   No   In the past 3 months, have you taken medications that affect  your immune system, such as prednisone, other steroids, or anticancer drugs; drugs for the treatment of rheumatoid arthritis, Crohn s disease, or psoriasis; or have you had radiation treatments?   No   Have you had a seizure, or a brain or other nervous system problem?   No   During the past year, have you received a transfusion of blood or blood    products, or been given immune (gamma) globulin or antiviral drug?   No   For women: Are you pregnant or is there a chance you could become       pregnant during the next month?   No   Have you received any vaccinations in the past 4 weeks?   No     Immunization questionnaire was positive for at least one answer.  Notified Laure Hutchins.      Patient instructed to remain in clinic for 15 minutes afterwards, and to report any adverse reactions.     Screening performed by Diane Hanna CMA on 3/6/2024 at 1:21 PM.

## 2024-03-06 NOTE — PROGRESS NOTES
Preventive Care Visit  [facility]  Laure Terry PA-C, Family Medicine  Mar 6, 2024        Benjamin Carmona is a 63 year old, presenting for the following:  Physical        3/6/2024    12:48 PM   Additional Questions   Roomed by Laure MORALES        Health Care Directive  Patient does not have a Health Care Directive or Living Will: Discussed advance care planning with patient; information given to patient to review.    HPI  Patient presents today for follow-up of blood pressure. Numbers have been well controlled. Tolerating medications well without side effects. Monitoring salt in diet. Patient denies headaches, vision changes, chest pain, shortness of breath or paresthesias.        3/6/2024   General Health   How would you rate your overall physical health? (!) FAIR   Feel stress (tense, anxious, or unable to sleep) To some extent   (!) STRESS CONCERN      3/6/2024   Nutrition   Three or more servings of calcium each day? (!) NO   Diet: Regular (no restrictions)   How many servings of fruit and vegetables per day? (!) 2-3   How many sweetened beverages each day? 0-1         3/6/2024   Exercise   Days per week of moderate/strenous exercise 0 days   Average minutes spent exercising at this level 0 min   (!) EXERCISE CONCERN      3/6/2024   Social Factors   Frequency of gathering with friends or relatives Once a week   Worry food won't last until get money to buy more No   Food not last or not have enough money for food? No   Do you have housing?  Yes   Are you worried about losing your housing? No   Lack of transportation? No   Unable to get utilities (heat,electricity)? No          No data to display                   3/6/2024   Dental   Dentist two times every year? (!) NO         3/6/2024   TB Screening   Were you born outside of [place]?  No       Today's PHQ-9 Score:       3/6/2024    12:38 PM   PHQ-9 SCORE   PHQ-9 Total Score MyChart 7 (Mild depression)   PHQ-9 Total Score 7          3/6/2024   Substance Use   Alcohol more than 3/day or more than 7/wk Yes   How often do you have a drink containing alcohol 4 or more times a week   How many alcohol drinks on typical day 1 or 2   How often do you have 5+ drinks at one occasion Less than monthly   Audit 2/3 Score 1   How often not able to stop drinking once started Less than monthly   How often failed to do what normally expected Less than monthly   How often needed first drink in am after a heavy drinking session Never   How often feeling of guilt or remorse after drinking Less than monthly   How often unable to remember what happened the night before Less than monthly   Have you or someone else been injured because of your drinking Yes, but not in the last year   Has anyone been concerned or suggested you cut down on drinking Yes, during the last year   TOTAL SCORE - AUDIT 15   Do you use any other substances recreationally? (!) ALCOHOL     Social History     Tobacco Use    Smoking status: Every Day     Packs/day: 0.50     Years: 40.00     Additional pack years: 0.00     Total pack years: 20.00     Types: Cigarettes    Smokeless tobacco: Never   Vaping Use    Vaping Use: Never used   Substance Use Topics    Alcohol use: Yes     Alcohol/week: 5.0 standard drinks of alcohol     Types: 5 Glasses of wine per week     Comment: 3-4 per week 2-3 drinks     Drug use: No             3/6/2024   Breast Cancer Screening   Family history of breast, colon, or ovarian cancer? Yes         3/6/2024   LAST FHS-7 RESULTS   1st degree relative breast or ovarian cancer Yes   Any relative bilateral breast cancer Unknown   Any male have breast cancer No   Any ONE woman have BOTH breast AND ovarian cancer No   Any woman with breast cancer before 50yrs No   2 or more relatives with breast AND/OR ovarian cancer No   2 or more relatives with breast AND/OR bowel cancer No        Mammogram Screening - Mammogram every 1-2 years updated in Health Maintenance based on mutual  "decision making        3/6/2024   STI Screening   New sexual partner(s) since last STI/HIV test? No     History of abnormal Pap smear: Status post benign hysterectomy. Health Maintenance and Surgical History updated.       ASCVD Risk   The ASCVD Risk score (Chetan JOHNSON, et al., 2019) failed to calculate for the following reasons:    The valid HDL cholesterol range is 20 to 100 mg/dL         Reviewed and updated as needed this visit by Provider                      Review of Systems  Constitutional, HEENT, cardiovascular, pulmonary, GI, , musculoskeletal, neuro, skin, endocrine and psych systems are negative, except as otherwise noted.     Objective    Exam  /74   Pulse 87   Temp 97.4  F (36.3  C) (Temporal)   Resp 14   Ht 1.73 m (5' 8.11\")   Wt 100.7 kg (222 lb)   LMP  (LMP Unknown)   SpO2 100%   BMI 33.65 kg/m     Estimated body mass index is 33.65 kg/m  as calculated from the following:    Height as of this encounter: 1.73 m (5' 8.11\").    Weight as of this encounter: 100.7 kg (222 lb).    Physical Exam  GENERAL: alert and no distress  EYES: Eyes grossly normal to inspection, PERRL and conjunctivae and sclerae normal  HENT: ear canals and TM's normal, nose and mouth without ulcers or lesions  NECK: no adenopathy, no asymmetry, masses, or scars  RESP: lungs clear to auscultation - no rales, rhonchi or wheezes  CV: regular rate and rhythm, normal S1 S2, no S3 or S4, no murmur, click or rub, no peripheral edema  ABDOMEN: soft, nontender, no hepatosplenomegaly, no masses and bowel sounds normal  MS: no gross musculoskeletal defects noted, no edema  SKIN: no suspicious lesions or rashes  NEURO: Normal strength and tone, mentation intact and speech normal  PSYCH: mentation appears normal, affect normal/bright      Signed Electronically by: Laure Terry PA-C    Assessment & Plan     Routine general medical examination at a health care facility    HTN, goal below 140/90  Stable. Continue current " "treatment without change.   - Albumin Random Urine Quantitative with Creat Ratio; Future  - lisinopril (ZESTRIL) 10 MG tablet; Take 1 tablet (10 mg) by mouth daily  - Comprehensive metabolic panel (BMP + Alb, Alk Phos, ALT, AST, Total. Bili, TP); Future  - Albumin Random Urine Quantitative with Creat Ratio  - Comprehensive metabolic panel (BMP + Alb, Alk Phos, ALT, AST, Total. Bili, TP)    Anal fissure  Uses as needed.   - hydrocortisone, Perianal, (HYDROCORTISONE) 2.5 % cream; Place rectally 2 times daily as needed for hemorrhoids    Arthritis  Stable.   - celecoxib (CELEBREX) 100 MG capsule; Take 1 capsule (100 mg) by mouth 2 times daily    S/P gastric bypass  Labs updated today. Encouraged ongoing diet and exercise modifications.   - Lipid panel reflex to direct LDL Fasting; Future  - CBC with platelets; Future  - Vitamin D Deficiency; Future  - Vitamin B12; Future  - Comprehensive metabolic panel (BMP + Alb, Alk Phos, ALT, AST, Total. Bili, TP); Future  - Lipid panel reflex to direct LDL Fasting  - CBC with platelets  - Vitamin D Deficiency  - Vitamin B12  - Comprehensive metabolic panel (BMP + Alb, Alk Phos, ALT, AST, Total. Bili, TP)    Swelling  - hydroCHLOROthiazide 12.5 MG tablet; Take 1 tablet daily as needed    Lipid screening  Ordered as above.     Screening for thyroid disorder  - TSH with free T4 reflex; Future  - TSH with free T4 reflex    Visit for screening mammogram  - *MA Screening Digital Bilateral; Future    Patient has been advised of split billing requirements and indicates understanding: Yes    Nicotine/Tobacco Cessation  She reports that she has been smoking cigarettes. She has a 20 pack-year smoking history. She has never used smokeless tobacco.  Nicotine/Tobacco Cessation Plan  Information offered: Patient not interested at this time      BMI  Estimated body mass index is 33.65 kg/m  as calculated from the following:    Height as of this encounter: 1.73 m (5' 8.11\").    Weight as of this " encounter: 100.7 kg (222 lb).   Weight management plan: Discussed healthy diet and exercise guidelines    Counseling  Appropriate preventive services were discussed with this patient, including applicable screening as appropriate for fall prevention, nutrition, physical activity, Tobacco-use cessation, weight loss and cognition.  Checklist reviewing preventive services available has been given to the patient.  Reviewed patient's diet, addressing concerns and/or questions.   The patient was instructed to see the dentist every 6 months.   The patient reports drinking more than one alcoholic drink per day and sometimes engages in binge or excessive drinking. The patient was counseled and given information about possible harmful effects of excessive alcohol intake as well as where to get help for alcohol problems. The patient's PHQ-9 score is consistent with mild depression. She was provided with information regarding depression.       Answers submitted by the patient for this visit:  Patient Health Questionnaire (Submitted on 3/6/2024)  If you checked off any problems, how difficult have these problems made it for you to do your work, take care of things at home, or get along with other people?: Somewhat difficult  PHQ9 TOTAL SCORE: 7

## 2024-03-06 NOTE — LETTER
March 8, 2024      [address]  [address]   [facility] [id number]        Dear ,    We are writing to inform you of your test results.    Labs are all normal/stable.     Laure Terry PA-C    Resulted Orders   Albumin Random Urine Quantitative with Creat Ratio   Result Value Ref Range    Creatinine Urine mg/dL 33.6 mg/dL      Comment:      The reference ranges have not been established in urine creatinine. The results should be integrated into the clinical context for interpretation.    Albumin Urine mg/L <12.0 mg/L      Comment:      The reference ranges have not been established in urine albumin. The results should be integrated into the clinical context for interpretation.    Albumin Urine mg/g Cr        Comment:      Unable to calculate, urine albumin and/or urine creatinine is outside detectable limits.  Microalbuminuria is defined as an albumin:creatinine ratio of 17 to 299 for males and 25 to 299 for females. A ratio of albumin:creatinine of 300 or higher is indicative of overt proteinuria.  Due to biologic variability, positive results should be confirmed by a second, first-morning random or 24-hour timed urine specimen. If there is discrepancy, a third specimen is recommended. When 2 out of 3 results are in the microalbuminuria range, this is evidence for incipient nephropathy and warrants increased efforts at glucose control, blood pressure control, and institution of therapy with an angiotensin-converting-enzyme (ACE) inhibitor (if the patient can tolerate it).     Lipid panel reflex to direct LDL Fasting   Result Value Ref Range    Cholesterol 215 (H) <200 mg/dL    Triglycerides 93 <150 mg/dL    Direct Measure  >=50 mg/dL    LDL Cholesterol Calculated 74 <=100 mg/dL    Non HDL Cholesterol 93 <130 mg/dL    Patient Fasting > 8hrs? No     Narrative    Cholesterol  Desirable:  <200 mg/dL    Triglycerides  Normal:  Less than 150 mg/dL  Borderline High:  150-199 mg/dL  High:  200-499 mg/dL  Very  High:  Greater than or equal to 500 mg/dL    Direct Measure HDL  Female:  Greater than or equal to 50 mg/dL   Male:  Greater than or equal to 40 mg/dL    LDL Cholesterol  Desirable:  <100mg/dL  Above Desirable:  100-129 mg/dL   Borderline High:  130-159 mg/dL   High:  160-189 mg/dL   Very High:  >= 190 mg/dL    Non HDL Cholesterol  Desirable:  130 mg/dL  Above Desirable:  130-159 mg/dL  Borderline High:  160-189 mg/dL  High:  190-219 mg/dL  Very High:  Greater than or equal to 220 mg/dL   CBC with platelets   Result Value Ref Range    WBC Count 5.3 4.0 - 11.0 10e3/uL    RBC Count 4.54 3.80 - 5.20 10e6/uL    Hemoglobin 11.7 11.7 - 15.7 g/dL    Hematocrit 36.6 35.0 - 47.0 %    MCV 81 78 - 100 fL    MCH 25.8 (L) 26.5 - 33.0 pg    MCHC 32.0 31.5 - 36.5 g/dL    RDW 15.3 (H) 10.0 - 15.0 %    Platelet Count 331 150 - 450 10e3/uL   Vitamin D Deficiency   Result Value Ref Range    Vitamin D, Total (25-Hydroxy) 33 20 - 50 ng/mL      Comment:      optimum levels    Narrative    Season, race, dietary intake, and treatment affect the concentration of 25-hydroxy-Vitamin D. Values may decrease during winter months and increase during summer months.    Vitamin D determination is routinely performed by an immunoassay specific for 25 hydroxyvitamin D3.  If an individual is on vitamin D2(ergocalciferol) supplementation, please specify 25 OH vitamin D2 and D3 level determination by LCMSMS test [id number].     Vitamin B12   Result Value Ref Range    Vitamin B12 503 232 - 1,245 pg/mL   Comprehensive metabolic panel (BMP + Alb, Alk Phos, ALT, AST, Total. Bili, TP)   Result Value Ref Range    Sodium 135 135 - 145 mmol/L      Comment:      Reference intervals for this test were updated on 09/26/2023 to more accurately reflect our healthy population. There may be differences in the flagging of prior results with similar values performed with this method. Interpretation of those prior results can be made in the context of the updated reference  intervals.     Potassium 4.2 3.4 - 5.3 mmol/L    Carbon Dioxide (CO2) 22 22 - 29 mmol/L    Anion Gap 13 7 - 15 mmol/L    Urea Nitrogen 7.3 (L) 8.0 - 23.0 mg/dL    Creatinine 0.72 0.51 - 0.95 mg/dL    GFR Estimate >90 >60 mL/min/1.73m2    Calcium 9.9 8.8 - 10.2 mg/dL    Chloride 100 98 - 107 mmol/L    Glucose 90 70 - 99 mg/dL    Alkaline Phosphatase 90 40 - 150 U/L      Comment:      Reference intervals for this test were updated on 11/14/2023 to more accurately reflect our healthy population. There may be differences in the flagging of prior results with similar values performed with this method. Interpretation of those prior results can be made in the context of the updated reference intervals.    AST 22 0 - 45 U/L      Comment:      Reference intervals for this test were updated on 6/12/2023 to more accurately reflect our healthy population. There may be differences in the flagging of prior results with similar values performed with this method. Interpretation of those prior results can be made in the context of the updated reference intervals.    ALT 8 0 - 50 U/L      Comment:      Reference intervals for this test were updated on 6/12/2023 to more accurately reflect our healthy population. There may be differences in the flagging of prior results with similar values performed with this method. Interpretation of those prior results can be made in the context of the updated reference intervals.      Protein Total 7.4 6.4 - 8.3 g/dL    Albumin 4.2 3.5 - 5.2 g/dL    Bilirubin Total 0.3 <=1.2 mg/dL   TSH with free T4 reflex   Result Value Ref Range    TSH 0.74 0.30 - 4.20 uIU/mL       If you have any questions or concerns, please call the clinic at the number listed above.

## 2024-03-07 LAB
VIT B12 SERPL-MCNC: 503 PG/ML (ref 232–1245)
VIT D+METAB SERPL-MCNC: 33 NG/ML (ref 20–50)

## 2024-04-12 DIAGNOSIS — D51.9 ANEMIA DUE TO VITAMIN B12 DEFICIENCY, UNSPECIFIED B12 DEFICIENCY TYPE: ICD-10-CM

## 2024-04-12 DIAGNOSIS — Z98.84 S/P GASTRIC BYPASS: ICD-10-CM

## 2024-04-12 RX ORDER — CYANOCOBALAMIN 1000 UG/ML
INJECTION, SOLUTION INTRAMUSCULAR; SUBCUTANEOUS
Qty: 3 ML | Refills: 1 | Status: SHIPPED | OUTPATIENT
Start: 2024-04-12

## 2024-06-12 ENCOUNTER — HOSPITAL ENCOUNTER (OUTPATIENT)
Dept: MAMMOGRAPHY | Facility: CLINIC | Age: 64
Discharge: HOME | End: 2024-06-12
Attending: PHYSICIAN ASSISTANT | Admitting: PHYSICIAN ASSISTANT
Payer: COMMERCIAL

## 2024-06-12 DIAGNOSIS — Z12.31 VISIT FOR SCREENING MAMMOGRAM: ICD-10-CM

## 2024-06-12 PROCEDURE — 77063 BREAST TOMOSYNTHESIS BI: CPT

## 2024-07-08 NOTE — NURSING NOTE
Health Maintenance Due   Topic Date Due     ADVANCE CARE PLANNING  1960     HIV SCREENING  03/12/1975     ZOSTER IMMUNIZATION (2 of 3) 02/10/2014     PREVENTIVE CARE VISIT  08/30/2018     BMP  10/03/2019     MICROALBUMIN  10/03/2019     PHQ-2  01/01/2020      Dominique Jasso LPN........1/29/2020 8:10 AM    Patient presents to unit  at 32weeks 2 days with complaints of vaginal pressure and pain since 19 weeks.  Patient states that she was running errands with her mother and sat on a hard chair for some time and the pain became unbearable, which brought her to be seen.  Patient also complains of a vaginal discharge and infection as well as possible ringworm under her breasts that she has mentioned to her primary OB and then was referred to MFM for treatment.  Denies LOF, VB & CTX.  Good fetal movement noted by patient.  Patient placed on EFM. Call light within reach.

## 2024-07-30 DIAGNOSIS — M19.90 ARTHRITIS: ICD-10-CM

## 2024-08-01 RX ORDER — CELECOXIB 100 MG/1
100 CAPSULE ORAL 2 TIMES DAILY
Qty: 60 CAPSULE | Refills: 0 | Status: SHIPPED | OUTPATIENT
Start: 2024-08-01 | End: 2024-09-23

## 2024-08-29 DIAGNOSIS — R10.13 EPIGASTRIC PAIN: ICD-10-CM

## 2024-08-30 RX ORDER — FAMOTIDINE 20 MG/1
20 TABLET, FILM COATED ORAL 2 TIMES DAILY
Qty: 180 TABLET | Refills: 1 | Status: SHIPPED | OUTPATIENT
Start: 2024-08-30

## 2024-09-23 DIAGNOSIS — M19.90 ARTHRITIS: ICD-10-CM

## 2024-09-23 RX ORDER — CELECOXIB 100 MG/1
100 CAPSULE ORAL 2 TIMES DAILY
Qty: 60 CAPSULE | Refills: 4 | Status: SHIPPED | OUTPATIENT
Start: 2024-09-23

## 2024-10-20 DIAGNOSIS — D51.9 ANEMIA DUE TO VITAMIN B12 DEFICIENCY, UNSPECIFIED B12 DEFICIENCY TYPE: ICD-10-CM

## 2024-10-20 DIAGNOSIS — Z98.84 S/P GASTRIC BYPASS: ICD-10-CM

## 2024-10-21 RX ORDER — CYANOCOBALAMIN 1000 UG/ML
INJECTION, SOLUTION INTRAMUSCULAR; SUBCUTANEOUS
Qty: 3 ML | Refills: 1 | Status: SHIPPED | OUTPATIENT
Start: 2024-10-21

## 2025-01-25 DIAGNOSIS — M19.90 ARTHRITIS: ICD-10-CM

## 2025-01-27 RX ORDER — CELECOXIB 100 MG/1
100 CAPSULE ORAL 2 TIMES DAILY
Qty: 60 CAPSULE | Refills: 4 | Status: SHIPPED | OUTPATIENT
Start: 2025-01-27

## 2025-01-27 NOTE — TELEPHONE ENCOUNTER
Rx approved. Will be due for yearly physical in March. Please reach out to schedule.     Laure Terry PA-C

## 2025-02-24 DIAGNOSIS — I10 HTN, GOAL BELOW 140/90: ICD-10-CM

## 2025-02-24 RX ORDER — LISINOPRIL 10 MG/1
10 TABLET ORAL DAILY
Qty: 90 TABLET | Refills: 0 | Status: SHIPPED | OUTPATIENT
Start: 2025-02-24

## 2025-04-01 ENCOUNTER — PATIENT OUTREACH (OUTPATIENT)
Dept: GASTROENTEROLOGY | Facility: CLINIC | Age: 65
End: 2025-04-01

## 2025-04-01 PROBLEM — D12.6 ADENOMATOUS COLON POLYP: Status: ACTIVE | Noted: 2025-04-01

## 2025-05-12 DIAGNOSIS — Z98.84 S/P GASTRIC BYPASS: ICD-10-CM

## 2025-05-12 DIAGNOSIS — D51.9 ANEMIA DUE TO VITAMIN B12 DEFICIENCY, UNSPECIFIED B12 DEFICIENCY TYPE: ICD-10-CM

## 2025-05-12 RX ORDER — CYANOCOBALAMIN 1000 UG/ML
INJECTION, SOLUTION INTRAMUSCULAR; SUBCUTANEOUS
Qty: 3 ML | Refills: 0 | Status: SHIPPED | OUTPATIENT
Start: 2025-05-12

## 2025-05-25 DIAGNOSIS — M19.90 ARTHRITIS: ICD-10-CM

## 2025-05-25 DIAGNOSIS — I10 HTN, GOAL BELOW 140/90: ICD-10-CM

## 2025-05-25 DIAGNOSIS — R10.13 EPIGASTRIC PAIN: ICD-10-CM

## 2025-05-27 RX ORDER — FAMOTIDINE 20 MG/1
20 TABLET, FILM COATED ORAL 2 TIMES DAILY
Qty: 180 TABLET | Refills: 0 | Status: SHIPPED | OUTPATIENT
Start: 2025-05-27

## 2025-05-27 RX ORDER — CELECOXIB 100 MG/1
100 CAPSULE ORAL 2 TIMES DAILY
Qty: 60 CAPSULE | Refills: 0 | Status: SHIPPED | OUTPATIENT
Start: 2025-05-27

## 2025-05-27 RX ORDER — LISINOPRIL 10 MG/1
10 TABLET ORAL DAILY
Qty: 90 TABLET | Refills: 0 | Status: SHIPPED | OUTPATIENT
Start: 2025-05-27

## 2025-06-23 ENCOUNTER — OFFICE VISIT (OUTPATIENT)
Dept: FAMILY MEDICINE | Facility: CLINIC | Age: 65
End: 2025-06-23
Payer: COMMERCIAL

## 2025-06-23 VITALS
BODY MASS INDEX: 32.64 KG/M2 | RESPIRATION RATE: 16 BRPM | HEIGHT: 68 IN | DIASTOLIC BLOOD PRESSURE: 78 MMHG | HEART RATE: 65 BPM | WEIGHT: 215.4 LBS | SYSTOLIC BLOOD PRESSURE: 150 MMHG | TEMPERATURE: 97.7 F | OXYGEN SATURATION: 100 %

## 2025-06-23 DIAGNOSIS — Z00.00 ENCOUNTER FOR MEDICARE ANNUAL WELLNESS EXAM: Primary | ICD-10-CM

## 2025-06-23 DIAGNOSIS — Z13.29 SCREENING FOR THYROID DISORDER: ICD-10-CM

## 2025-06-23 DIAGNOSIS — Z98.84 S/P GASTRIC BYPASS: ICD-10-CM

## 2025-06-23 DIAGNOSIS — R09.82 PND (POST-NASAL DRIP): ICD-10-CM

## 2025-06-23 DIAGNOSIS — I10 HTN, GOAL BELOW 140/90: ICD-10-CM

## 2025-06-23 DIAGNOSIS — R10.13 EPIGASTRIC PAIN: ICD-10-CM

## 2025-06-23 DIAGNOSIS — Z78.0 ASYMPTOMATIC POSTMENOPAUSAL STATUS: ICD-10-CM

## 2025-06-23 DIAGNOSIS — M19.90 ARTHRITIS: ICD-10-CM

## 2025-06-23 DIAGNOSIS — D51.9 ANEMIA DUE TO VITAMIN B12 DEFICIENCY, UNSPECIFIED B12 DEFICIENCY TYPE: ICD-10-CM

## 2025-06-23 DIAGNOSIS — E55.9 VITAMIN D DEFICIENCY: ICD-10-CM

## 2025-06-23 DIAGNOSIS — R60.9 SWELLING: ICD-10-CM

## 2025-06-23 PROBLEM — E66.01 SEVERE OBESITY WITH BODY MASS INDEX (BMI) OF 35.0 TO 39.9 WITH SERIOUS COMORBIDITY (H): Status: RESOLVED | Noted: 2017-08-30 | Resolved: 2025-06-23

## 2025-06-23 LAB
ANION GAP SERPL CALCULATED.3IONS-SCNC: 14 MMOL/L (ref 7–15)
BUN SERPL-MCNC: 9.6 MG/DL (ref 8–23)
CALCIUM SERPL-MCNC: 9.9 MG/DL (ref 8.8–10.4)
CHLORIDE SERPL-SCNC: 96 MMOL/L (ref 98–107)
CREAT SERPL-MCNC: 0.79 MG/DL (ref 0.51–0.95)
CREAT UR-MCNC: 59 MG/DL
EGFRCR SERPLBLD CKD-EPI 2021: 83 ML/MIN/1.73M2
ERYTHROCYTE [DISTWIDTH] IN BLOOD BY AUTOMATED COUNT: 14.8 % (ref 10–15)
FERRITIN SERPL-MCNC: 15 NG/ML (ref 11–328)
GLUCOSE SERPL-MCNC: 104 MG/DL (ref 70–99)
HCO3 SERPL-SCNC: 26 MMOL/L (ref 22–29)
HCT VFR BLD AUTO: 37.1 % (ref 35–47)
HGB BLD-MCNC: 12.1 G/DL (ref 11.7–15.7)
MCH RBC QN AUTO: 27.5 PG (ref 26.5–33)
MCHC RBC AUTO-ENTMCNC: 32.6 G/DL (ref 31.5–36.5)
MCV RBC AUTO: 84 FL (ref 78–100)
MICROALBUMIN UR-MCNC: <12 MG/L
MICROALBUMIN/CREAT UR: NORMAL MG/G{CREAT}
PLATELET # BLD AUTO: 270 10E3/UL (ref 150–450)
POTASSIUM SERPL-SCNC: 4.2 MMOL/L (ref 3.4–5.3)
RBC # BLD AUTO: 4.4 10E6/UL (ref 3.8–5.2)
SODIUM SERPL-SCNC: 136 MMOL/L (ref 135–145)
TSH SERPL DL<=0.005 MIU/L-ACNC: 0.87 UIU/ML (ref 0.3–4.2)
VIT B12 SERPL-MCNC: 734 PG/ML (ref 232–1245)
VIT D+METAB SERPL-MCNC: 40 NG/ML (ref 20–50)
WBC # BLD AUTO: 4.8 10E3/UL (ref 4–11)

## 2025-06-23 PROCEDURE — 36415 COLL VENOUS BLD VENIPUNCTURE: CPT | Performed by: PHYSICIAN ASSISTANT

## 2025-06-23 RX ORDER — CYANOCOBALAMIN 1000 UG/ML
INJECTION, SOLUTION INTRAMUSCULAR; SUBCUTANEOUS
Qty: 3 ML | Refills: 0 | Status: SHIPPED | OUTPATIENT
Start: 2025-06-23

## 2025-06-23 RX ORDER — CELECOXIB 100 MG/1
100 CAPSULE ORAL 2 TIMES DAILY
Qty: 180 CAPSULE | Refills: 3 | Status: SHIPPED | OUTPATIENT
Start: 2025-06-23

## 2025-06-23 RX ORDER — LISINOPRIL 20 MG/1
20 TABLET ORAL DAILY
Qty: 90 TABLET | Refills: 3 | Status: SHIPPED | OUTPATIENT
Start: 2025-06-23

## 2025-06-23 RX ORDER — FAMOTIDINE 20 MG/1
20 TABLET, FILM COATED ORAL 2 TIMES DAILY
Qty: 180 TABLET | Refills: 4 | Status: SHIPPED | OUTPATIENT
Start: 2025-06-23

## 2025-06-23 RX ORDER — FLUTICASONE PROPIONATE 50 MCG
1 SPRAY, SUSPENSION (ML) NASAL DAILY
Qty: 16 G | Refills: 11 | Status: SHIPPED | OUTPATIENT
Start: 2025-06-23

## 2025-06-23 RX ORDER — HYDROCHLOROTHIAZIDE 12.5 MG/1
TABLET ORAL
Qty: 90 TABLET | Refills: 3 | Status: SHIPPED | OUTPATIENT
Start: 2025-06-23

## 2025-06-23 SDOH — HEALTH STABILITY: PHYSICAL HEALTH: ON AVERAGE, HOW MANY DAYS PER WEEK DO YOU ENGAGE IN MODERATE TO STRENUOUS EXERCISE (LIKE A BRISK WALK)?: 0 DAYS

## 2025-06-23 ASSESSMENT — PATIENT HEALTH QUESTIONNAIRE - PHQ9
SUM OF ALL RESPONSES TO PHQ QUESTIONS 1-9: 6
SUM OF ALL RESPONSES TO PHQ QUESTIONS 1-9: 6
10. IF YOU CHECKED OFF ANY PROBLEMS, HOW DIFFICULT HAVE THESE PROBLEMS MADE IT FOR YOU TO DO YOUR WORK, TAKE CARE OF THINGS AT HOME, OR GET ALONG WITH OTHER PEOPLE: SOMEWHAT DIFFICULT
SUM OF ALL RESPONSES TO PHQ QUESTIONS 1-9: 6

## 2025-06-23 ASSESSMENT — PAIN SCALES - GENERAL: PAINLEVEL_OUTOF10: MILD PAIN (3)

## 2025-06-23 ASSESSMENT — SOCIAL DETERMINANTS OF HEALTH (SDOH)
HOW OFTEN DO YOU GET TOGETHER WITH FRIENDS OR RELATIVES?: PATIENT DECLINED
HOW OFTEN DO YOU GET TOGETHER WITH FRIENDS OR RELATIVES?: PATIENT DECLINED

## 2025-06-23 NOTE — PROGRESS NOTES
Preventive Care Visit  [facility]  Laure Terry PA-C, Family Medicine  Jun 23, 2025      Subjective   Kristine is a 65 year old, presenting for the following:  Annual Visit        6/23/2025    12:49 PM   Additional Questions   Roomed by Ivory Melendrez        History of Present Illness       Reason for visit:  Annual physical   She is taking medications regularly.    Francisco Cox, a 65-year-old female, reported experiencing persistent symptoms in her left ear, feeling as though she is underwater, accompanied by a constant dripping sensation. This has been ongoing for a couple of months and is not associated with pain, although it occasionally becomes tender. She has not experienced any ringing in the ear. Additionally, she mentioned a spot in the center of her left eye that has been blurry for about a month. Initially, the blurriness was intermittent, occurring during activities requiring concentration, such as doing nubia art or cutting her granddaughter's hair, but it has now become constant. She has not noticed any floaters, only the central blurriness. Kristine also discussed her blood pressure management, noting that she previously took hydrochlorothiazide daily but now uses it as needed, taking half a tablet every three days. She mentioned that when she was taking both hydrochlorothiazide and lisinopril, it caused her blood pressure to drop too much. She does check her blood pressure at home and this has been about the same as it is today. She continues to take vitamin D, B12, calcium, and famotidine, which have been effective, although she occasionally experiences heartburn. She also takes Celebrex for pain and inflammation, which has been helpful. Kristine expressed a long-standing feeling of being neither suicidal nor overjoyed with life, describing it as an underlying, persistent state. She mentioned her smoking and alcohol use have remained unchanged. She also noted a  tendency for her potassium levels to be low, which has been consistent over the years. Kristine shared that her sister  of cancer last year, although it was not lung cancer, this has still not changed her smoking behavior.  We discussed CT for lung cancer screening and she will consider.    Advance Care Planning    Discussed advance care planning with patient; informed AVS has link to [facility].        2025   General Health   How would you rate your overall physical health? (!) FAIR   Feel stress (tense, anxious, or unable to sleep) To some extent   (!) STRESS CONCERN      2025   Nutrition   Diet: Regular (no restrictions)         2025   Exercise   Days per week of moderate/strenous exercise 0 days   (!) EXERCISE CONCERN      2025   Social Factors   Frequency of gathering with friends or relatives Patient declined   Worry food won't last until get money to buy more No   Food not last or not have enough money for food? No   Do you have housing? (Housing is defined as stable permanent housing and does not include staying outside in a car, in a tent, in an abandoned building, in an overnight shelter, or couch-surfing.) Yes   Are you worried about losing your housing? No   Lack of transportation? No   Unable to get utilities (heat,electricity)? No         2025   Fall Risk   Fallen 2 or more times in the past year? No     No    No   Trouble with walking or balance? No     No    No       Proxy-reported    Multiple values from one day are sorted in reverse-chronological order          2025   Activities of Daily Living- Home Safety   Needs help with the following daily activites None of the above   Safety concerns in the home None of the above         2025   Dental   Dentist two times every year? (!) NO         2025   Hearing Screening   Hearing concerns? None of the above         2025   Driving Risk Screening   Patient/family members have concerns about driving No          6/23/2025   General Alertness/Fatigue Screening   Have you been more tired than usual lately? No         6/23/2025   Urinary Incontinence Screening   Bothered by leaking urine in past 6 months Yes       Today's PHQ-9 Score:       6/23/2025    12:48 PM   PHQ-9 SCORE   PHQ-9 Total Score MyChart 6 (Mild depression)   PHQ-9 Total Score 6        Proxy-reported         6/23/2025   Substance Use   Alcohol more than 3/day or more than 7/wk Yes   How often do you have a drink containing alcohol 4 or more times a week   How many alcohol drinks on typical day 3 or 4   How often do you have 5+ drinks at one occasion Less than monthly   Audit 2/3 Score 2   How often not able to stop drinking once started Monthly   How often failed to do what normally expected Never   How often needed first drink in am after a heavy drinking session Never   How often feeling of guilt or remorse after drinking Less than monthly   How often unable to remember what happened the night before Monthly   Have you or someone else been injured because of your drinking Yes, but not in the last year   Has anyone been concerned or suggested you cut down on drinking Yes, during the last year   TOTAL SCORE - AUDIT 17   Do you have a current opioid prescription? No   How severe/bad is pain from 1 to 10? 2/10   Do you use any other substances recreationally? No     Social History     Tobacco Use    Smoking status: Every Day     Current packs/day: 0.50     Average packs/day: 0.5 packs/day for 40.0 years (20.0 ttl pk-yrs)     Types: Cigarettes    Smokeless tobacco: Never   Vaping Use    Vaping status: Never Used   Substance Use Topics    Alcohol use: Yes     Alcohol/week: 5.0 standard drinks of alcohol     Types: 5 Glasses of wine per week     Comment: 3-4 per week 2-3 drinks     Drug use: No           6/12/2024   LAST FHS-7 RESULTS   1st degree relative breast or ovarian cancer Yes   Any relative bilateral breast cancer No   Any male have breast cancer No    Any ONE woman have BOTH breast AND ovarian cancer No   Any woman with breast cancer before 50yrs No   2 or more relatives with breast AND/OR ovarian cancer No   2 or more relatives with breast AND/OR bowel cancer No        Mammogram Screening - Mammogram every 1-2 years updated in Health Maintenance based on mutual decision making    History of abnormal Pap smear: Status post hysterectomy with removal of cervix and no history of CIN2 or greater or cervical cancer. Health Maintenance and Surgical History updated.       ASCVD Risk   The ASCVD Risk score (Chetan JOHNSON, et al., 2019) failed to calculate for the following reasons:    The valid HDL cholesterol range is 20 to 100 mg/dL          Reviewed and updated as needed this visit by Provider     Meds  Problems             Current providers sharing in care for this patient include:  Patient Care Team:  Laure Terry PA-C as PCP - General (Family Medicine)  Laure Terry PA-C as Assigned PCP    The following health maintenance items are reviewed in Epic and correct as of today:  Health Maintenance   Topic Date Due    ZOSTER VACCINE (2 of 3) 02/10/2014    PNEUMOCOCCAL VACCINE 50+ YEARS (2 of 2 - PCV) 10/25/2017    DEXA  09/02/2024    LUNG CANCER SCREENING  11/25/2024    NICOTINE/TOBACCO CESSATION COUNSELING Q 1 YR  03/06/2025    BMP  03/06/2025    MICROALBUMIN  03/06/2025    COVID-19 VACCINE (8 - 2024-25 season) 04/23/2025    MAMMO SCREENING  06/12/2026    MEDICARE ANNUAL WELLNESS VISIT  06/23/2026    ANNUAL REVIEW OF HM ORDERS  06/23/2026    FALL RISK ASSESSMENT  06/23/2026    DIABETES SCREENING  03/06/2027    COLORECTAL CANCER SCREENING  07/08/2028    LIPID  03/06/2029    ADVANCE CARE PLANNING  06/23/2030    DTAP/TDAP/TD VACCINE (4 - Td or Tdap) 03/06/2034    RSV VACCINE (1 - 1-dose 75+ series) 03/12/2035    HEPATITIS C SCREENING  Completed    PHQ-2 (once per calendar year)  Completed    INFLUENZA VACCINE  Completed    HPV VACCINE  Aged Out    MENINGITIS  "VACCINE  Aged Out    HIV SCREENING  Discontinued         Review of Systems  Constitutional, HEENT, cardiovascular, pulmonary, GI, , musculoskeletal, neuro, skin, endocrine and psych systems are negative, except as otherwise noted.     Objective    Exam  BP (!) 150/78   Pulse 65   Temp 97.7  F (36.5  C) (Temporal)   Resp 16   Ht 1.725 m (5' 7.91\")   Wt 97.7 kg (215 lb 6.4 oz)   LMP  (LMP Unknown)   SpO2 100%   BMI 32.84 kg/m     Estimated body mass index is 32.84 kg/m  as calculated from the following:    Height as of this encounter: 1.725 m (5' 7.91\").    Weight as of this encounter: 97.7 kg (215 lb 6.4 oz).    Physical Exam  GENERAL: alert and no distress  EYES: Eyes grossly normal to inspection, PERRL and conjunctivae and sclerae normal  HENT: ear canals and TM's normal, nose and mouth without ulcers or lesions  NECK: no adenopathy, no asymmetry, masses, or scars  RESP: lungs clear to auscultation - no rales, rhonchi or wheezes  CV: regular rate and rhythm, normal S1 S2, no S3 or S4, no murmur, click or rub, no peripheral edema  ABDOMEN: soft, nontender, no hepatosplenomegaly, no masses and bowel sounds normal  MS: no gross musculoskeletal defects noted, no edema  SKIN: no suspicious lesions or rashes  NEURO: Normal strength and tone, mentation intact and speech normal  PSYCH: mentation appears normal, affect normal/bright        6/23/2025   Mini Cog   Clock Draw Score 2 Normal   3 Item Recall 2 objects recalled   Mini Cog Total Score 4         Vision Screen  Reason Vision Screen Not Completed: Screening Recommend: Patient/Guardian Declined (Sees Eye Doctor Regluarly.)      Assessment & Plan     Encounter for Medicare annual wellness exam  Vaccinations reviewed and declined at this time.    HTN, goal below 140/90  Blood pressure higher than desired. Recommended dose increase to 20 mg at this time. She will continue to monitor blood pressure at home.   - BASIC METABOLIC PANEL; Future  - Albumin Random " "Urine Quantitative with Creat Ratio; Future  - lisinopril (ZESTRIL) 20 MG tablet; Take 1 tablet (20 mg) by mouth daily.  - Albumin Random Urine Quantitative with Creat Ratio  - BASIC METABOLIC PANEL    Asymptomatic postmenopausal status  - DEXA HIP/PELVIS/SPINE - Future; Future    S/P gastric bypass  - CBC with platelets; Future  - Vitamin B12; Future  - Ferritin; Future  - cyanocobalamin (CYANOCOBALAMIN) 1000 mcg/mL injection; INJECT 1 ML AS DIRECTED EVERY 30 DAYS Strength: 1,000 mcg/mL  - Ferritin  - Vitamin B12  - CBC with platelets    Swelling  - hydroCHLOROthiazide 12.5 MG tablet; Take 1 tablet daily as needed    Arthritis  - celecoxib (CELEBREX) 100 MG capsule; Take 1 capsule (100 mg) by mouth 2 times daily.    Anemia due to vitamin B12 deficiency, unspecified B12 deficiency type  - cyanocobalamin (CYANOCOBALAMIN) 1000 mcg/mL injection; INJECT 1 ML AS DIRECTED EVERY 30 DAYS Strength: 1,000 mcg/mL    Epigastric pain  - famotidine (PEPCID) 20 MG tablet; Take 1 tablet (20 mg) by mouth 2 times daily.    Screening for thyroid disorder  - TSH with free T4 reflex; Future  - TSH with free T4 reflex    Vitamin D deficiency  - Vitamin D Deficiency; Future  - Vitamin D Deficiency    PND (post-nasal drip)  - fluticasone (FLONASE) 50 MCG/ACT nasal spray; Spray 1 spray into both nostrils daily.    Patient has been advised of split billing requirements and indicates understanding: Yes    Nicotine/Tobacco Cessation  She reports that she has been smoking cigarettes. She has a 20 pack-year smoking history. She has never used smokeless tobacco.  Nicotine/Tobacco Cessation Plan  Information offered: Patient not interested at this time      BMI  Estimated body mass index is 32.84 kg/m  as calculated from the following:    Height as of this encounter: 1.725 m (5' 7.91\").    Weight as of this encounter: 97.7 kg (215 lb 6.4 oz).   Weight management plan: Discussed healthy diet and exercise guidelines    Depression Screening Follow " Up        6/23/2025    12:48 PM   PHQ   PHQ-9 Total Score 6    Q9: Thoughts of better off dead/self-harm past 2 weeks Several days    F/U: Thoughts of suicide or self-harm No    F/U: Safety concerns No        Proxy-reported            No data to display                      Follow Up Actions Taken  Crisis resource information provided in the After Visit Summary    Discussed the following ways the patient can remain in a safe environment:  be around othersReviewed preventive health counseling, as reflected in patient instructions  Counseling  Appropriate preventive services were addressed with this patient via screening, questionnaire, or discussion as appropriate for fall prevention, nutrition, physical activity, Tobacco-use cessation, social engagement, weight loss and cognition.  Checklist reviewing preventive services available has been given to the patient.  Reviewed patient's diet, addressing concerns and/or questions.   The patient was instructed to see the dentist every 6 months.   She is at risk for psychosocial distress and has been provided with information to reduce risk.   The patient reports drinking more than 3 alcoholic drinks per day and/or more than 7 drhnks per week. The patient was counseled and given information about possible harmful effects of excessive alcohol intake.Information on urinary incontinence and treatment options given to patient.   The patient's PHQ-9 score is consistent with mild depression. She was provided with information regarding depression.       The longitudinal plan of care for the diagnosis(es)/condition(s) as documented were addressed during this visit. Due to the added complexity in care, I will continue to support Kristine in the subsequent management and with ongoing continuity of care.    Signed Electronically by: Laure Terry PA-C

## 2025-06-23 NOTE — PATIENT INSTRUCTIONS
Patient Education   Preventive Care Advice   This is general advice given by our system to help you stay healthy. However, your care team may have specific advice just for you. Please talk to your care team about your preventive care needs.  Nutrition  Eat 5 or more servings of fruits and vegetables each day.  Try wheat bread, brown rice and whole grain pasta (instead of white bread, rice, and pasta).  Get enough calcium and vitamin D. Check the label on foods and aim for 100% of the RDA (recommended daily allowance).  Lifestyle  Exercise at least 150 minutes each week  (30 minutes a day, 5 days a week).  Do muscle strengthening activities 2 days a week. These help control your weight and prevent disease.  No smoking.  Wear sunscreen to prevent skin cancer.  Have a dental exam and cleaning every 6 months.  Yearly exams  See your health care team every year to talk about:  Any changes in your health.  Any medicines your care team has prescribed.  Preventive care, family planning, and ways to prevent chronic diseases.  Shots (vaccines)   HPV shots (up to age 26), if you've never had them before.  Hepatitis B shots (up to age 59), if you've never had them before.  COVID-19 shot: Get this shot when it's due.  Flu shot: Get a flu shot every year.  Tetanus shot: Get a tetanus shot every 10 years.  Pneumococcal, hepatitis A, and RSV shots: Ask your care team if you need these based on your risk.  Shingles shot (for age 50 and up)  General health tests  Diabetes screening:  Starting at age 35, Get screened for diabetes at least every 3 years.  If you are younger than age 35, ask your care team if you should be screened for diabetes.  Cholesterol test: At age 39, start having a cholesterol test every 5 years, or more often if advised.  Bone density scan (DEXA): At age 50, ask your care team if you should have this scan for osteoporosis (brittle bones).  Hepatitis C: Get tested at least once in your life.  STIs (sexually  transmitted infections)  Before age 24: Ask your care team if you should be screened for STIs.  After age 24: Get screened for STIs if you're at risk. You are at risk for STIs (including HIV) if:  You are sexually active with more than one person.  You don't use condoms every time.  You or a partner was diagnosed with a sexually transmitted infection.  If you are at risk for HIV, ask about PrEP medicine to prevent HIV.  Get tested for HIV at least once in your life, whether you are at risk for HIV or not.  Cancer screening tests  Cervical cancer screening: If you have a cervix, begin getting regular cervical cancer screening tests starting at age 21.  Breast cancer scan (mammogram): If you've ever had breasts, begin having regular mammograms starting at age 40. This is a scan to check for breast cancer.  Colon cancer screening: It is important to start screening for colon cancer at age 45.  Have a colonoscopy test every 10 years (or more often if you're at risk) Or, ask your provider about stool tests like a FIT test every year or Cologuard test every 3 years.  To learn more about your testing options, visit:   .  For help making a decision, visit:   https://bit.ly/wl62736.  Prostate cancer screening test: If you have a prostate, ask your care team if a prostate cancer screening test (PSA) at age 55 is right for you.  Lung cancer screening: If you are a current or former smoker ages 50 to 80, ask your care team if ongoing lung cancer screenings are right for you.  For informational purposes only. Not to replace the advice of your health care provider. Copyright   2023 [facility]. All rights reserved. Clinically reviewed by the [facility] Transitions Program. [employer] [id number] - REV 01/24.  Learning About Stress  What is stress?     Stress is your body's response to a hard situation. Your body can have a physical, emotional, or mental response. Stress is a fact of life for most people, and it  affects everyone differently. What causes stress for you may not be stressful for someone else.  A lot of things can cause stress. You may feel stress when you go on a job interview, take a test, or run a race. This kind of short-term stress is normal and even useful. It can help you if you need to work hard or react quickly. For example, stress can help you finish an important job on time.  Long-term stress is caused by ongoing stressful situations or events. Examples of long-term stress include long-term health problems, ongoing problems at work, or conflicts in your family. Long-term stress can harm your health.  How does stress affect your health?  When you are stressed, your body responds as though you are in danger. It makes hormones that speed up your heart, make you breathe faster, and give you a burst of energy. This is called the fight-or-flight stress response. If the stress is over quickly, your body goes back to normal and no harm is done.  But if stress happens too often or lasts too long, it can have bad effects. Long-term stress can make you more likely to get sick, and it can make symptoms of some diseases worse. If you tense up when you are stressed, you may develop neck, shoulder, or low back pain. Stress is linked to high blood pressure and heart disease.  Stress also harms your emotional health. It can make you cohen, tense, or depressed. Your relationships may suffer, and you may not do well at work or school.  What can you do to manage stress?  You can try these things to help manage stress:   Do something active. Exercise or activity can help reduce stress. Walking is a great way to get started. Even everyday activities such as housecleaning or yard work can help.  Try yoga or tia chi. These techniques combine exercise and meditation. You may need some training at first to learn them.  Do something you enjoy. For example, listen to music or go to a movie. Practice your hobby or do volunteer  "work.  Meditate. This can help you relax, because you are not worrying about what happened before or what may happen in the future.  Do guided imagery. Imagine yourself in any setting that helps you feel calm. You can use online videos, books, or a teacher to guide you.  Do breathing exercises. For example:  From a standing position, bend forward from the waist with your knees slightly bent. Let your arms dangle close to the floor.  Breathe in slowly and deeply as you return to a standing position. Roll up slowly and lift your head last.  Hold your breath for just a few seconds in the standing position.  Breathe out slowly and bend forward from the waist.  Let your feelings out. Talk, laugh, cry, and express anger when you need to. Talking with supportive friends or family, a counselor, or a gabe leader about your feelings is a healthy way to relieve stress. Avoid discussing your feelings with people who make you feel worse.  Write. It may help to write about things that are bothering you. This helps you find out how much stress you feel and what is causing it. When you know this, you can find better ways to cope.  What can you do to prevent stress?  You might try some of these things to help prevent stress:  Manage your time. This helps you find time to do the things you want and need to do.  Get enough sleep. Your body recovers from the stresses of the day while you are sleeping.  Get support. Your family, friends, and community can make a difference in how you experience stress.  Limit your news feed. Avoid or limit time on social media or news that may make you feel stressed.  Do something active. Exercise or activity can help reduce stress. Walking is a great way to get started.  Where can you learn more?  Go to https://www.[employer].net/patiented  Enter N032 in the search box to learn more about \"Learning About Stress.\"  Current as of: October 24, 2024  Content Version: 14.5 2024-2025 [employer].   Care instructions adapted under license by your healthcare professional. If you have questions about a medical condition or this instruction, always ask your healthcare professional. [employer] disclaims any warranty or liability for your use of this information.    Bladder Training: Care Instructions  Your Care Instructions     Bladder training is used to treat urge incontinence and stress incontinence. Urge incontinence means that the need to urinate comes on so fast that you can't get to a toilet in time. Stress incontinence means that you leak urine because of pressure on your bladder. For example, it may happen when you laugh, cough, or lift something heavy.  Bladder training can increase how long you can wait before you have to urinate. It can also help your bladder hold more urine. And it can give you better control over the urge to urinate.  It is important to remember that bladder training takes a few weeks to a few months to make a difference. You may not see results right away, but don't give up.  Follow-up care is a key part of your treatment and safety. Be sure to make and go to all appointments, and call your doctor if you are having problems. It's also a good idea to know your test results and keep a list of the medicines you take.  How can you care for yourself at home?  Work with your doctor to come up with a bladder training program that is right for you. You may use one or more of the following methods.  Delayed urination  In the beginning, try to keep from urinating for 5 minutes after you first feel the need to go.  While you wait, take deep, slow breaths to relax. Kegel exercises can also help you delay the need to go to the bathroom.  After some practice, when you can easily wait 5 minutes to urinate, try to wait 10 minutes before you urinate.  Slowly increase the waiting period until you are able to control when you have to urinate.  Scheduled urination  Empty your bladder  "when you first wake up in the morning.  Schedule times throughout the day when you will urinate.  Start by going to the bathroom every hour, even if you don't need to go.  Slowly increase the time between trips to the bathroom.  When you have found a schedule that works well for you, keep doing it.  If you wake up during the night and have to urinate, do it. Apply your schedule to waking hours only.  Kegel exercises  These tighten and strengthen pelvic muscles, which can help you control the flow of urine. (If doing these exercises causes pain, stop doing them and talk with your doctor.) To do Kegel exercises:  Squeeze your muscles as if you were trying not to pass gas. Or squeeze your muscles as if you were stopping the flow of urine. Your belly, legs, and buttocks shouldn't move.  Hold the squeeze for 3 seconds, then relax for 5 to 10 seconds.  Start with 3 seconds, then add 1 second each week until you are able to squeeze for 10 seconds.  Repeat the exercise 10 times a session. Do 3 to 8 sessions a day.  When should you call for help?  Watch closely for changes in your health, and be sure to contact your doctor if:    Your incontinence is getting worse.     You do not get better as expected.   Where can you learn more?  Go to https://www.[employer].net/patiented  Enter V684 in the search box to learn more about \"Bladder Training: Care Instructions.\"  Current as of: April 30, 2024  Content Version: 14.5 2024-2025 [employer].   Care instructions adapted under license by your healthcare professional. If you have questions about a medical condition or this instruction, always ask your healthcare professional. [employer] disclaims any warranty or liability for your use of this information.    Learning About Depression Screening  What is depression screening?  Depression screening is a way to see if you have depression symptoms. It may be done by a doctor or counselor. It's often part of a " "routine checkup. That's because your mental health is just as important as your physical health.  Depression is a mental health condition that affects how you feel, think, and act. You may:  Have less energy.  Lose interest in your daily activities.  Feel sad and grouchy for a long time.  Depression is very common. It affects people of all ages.  Many things can lead to depression. Some people become depressed after they have a stroke or find out they have a major illness like cancer or heart disease. The death of a loved one or a breakup may lead to depression. It can run in families. Most experts believe that a combination of inherited genes and stressful life events can cause it.  What happens during screening?  You may be asked to fill out a form about your depression symptoms. You and the doctor will discuss your answers. The doctor may ask you more questions to learn more about how you think, act, and feel.  What happens after screening?  If you have symptoms of depression, your doctor will talk to you about your options.  Doctors usually treat depression with medicines or counseling. Often, combining the two works best. Many people don't get help because they think that they'll get over the depression on their own. But people with depression may not get better unless they get treatment.  The cause of depression is not well understood. There may be many factors involved. But if you have depression, it's not your fault.  A serious symptom of depression is thinking about death or suicide. If you or someone you care about talks about this or about feeling hopeless, get help right away.  It's important to know that depression can be treated. Medicine, counseling, and self-care may help.  Where can you learn more?  Go to https://www.healthwise.net/patiented  Enter T185 in the search box to learn more about \"Learning About Depression Screening.\"  Current as of: July 31, 2024  Content Version: 14.5 2024-2025 " "[employer].   Care instructions adapted under license by your healthcare professional. If you have questions about a medical condition or this instruction, always ask your healthcare professional. [employer] disclaims any warranty or liability for your use of this information.    9 Ways to Cut Back on Drinking  Maybe you've found yourself drinking more alcohol than you'd prefer. If you want to cut back, here are some ideas to try.    Think before you drink.  Do you really want a drink, or is it just a habit? If you're used to having a drink at a certain time, try doing something else then.     Look for substitutes.  Find some no-alcohol drinks that you enjoy, like flavored seltzer water, tea with honey, or tonic with a slice of lime. Or try alcohol-free beer or \"virgin\" cocktails (without the alcohol).     Drink more water.  Use water to quench your thirst. Drink a glass of water before you have any alcohol. Have another glass along with every drink or between drinks.     Shrink your drink.  For example, have a bottle of beer instead of a pint. Use a smaller glass for wine. Choose drinks with lower alcohol content (ABV%). Or use less liquor and more mixer in cocktails.     Slow down.  It's easy to drink quickly and without thinking about it. Pay attention, and make each drink last longer.     Do the math.  Total up how much you spend on alcohol each month. How much is that a year? If you cut back, what could you do with the money you save?     Take a break.  Choose a day or two each week when you won't drink at all. Notice how you feel on those days, physically and emotionally. How did you sleep? Do you feel better? Over time, add more break days.     Count calories.  Would you like to lose some weight? For some people that's a good motivator for cutting back. Figure out how many calories are in each drink. How many does that add up to in a day? In a week? In a month?     Practice saying no.  " "Be ready when someone offers you a drink. Try: \"Thanks, I've had enough.\" Or \"Thanks, but I'm cutting back.\" Or \"No, thanks. I feel better when I drink less.\"   Current as of: August 20, 2024  Content Version: 14.5 2024-2025 [employer].   Care instructions adapted under license by your healthcare professional. If you have questions about a medical condition or this instruction, always ask your healthcare professional. [employer] disclaims any warranty or liability for your use of this information.     "

## 2025-06-24 ENCOUNTER — RESULTS FOLLOW-UP (OUTPATIENT)
Dept: FAMILY MEDICINE | Facility: CLINIC | Age: 65
End: 2025-06-24

## 2025-07-18 ENCOUNTER — HOSPITAL ENCOUNTER (OUTPATIENT)
Dept: MAMMOGRAPHY | Facility: CLINIC | Age: 65
Discharge: HOME | End: 2025-07-18
Attending: PHYSICIAN ASSISTANT
Payer: COMMERCIAL

## 2025-07-18 ENCOUNTER — HOSPITAL ENCOUNTER (OUTPATIENT)
Dept: BONE DENSITY | Facility: CLINIC | Age: 65
Discharge: HOME | End: 2025-07-18
Attending: PHYSICIAN ASSISTANT
Payer: COMMERCIAL

## 2025-07-18 DIAGNOSIS — Z78.0 ASYMPTOMATIC POSTMENOPAUSAL STATUS: ICD-10-CM

## 2025-07-18 DIAGNOSIS — Z12.31 VISIT FOR SCREENING MAMMOGRAM: ICD-10-CM

## 2025-07-18 PROCEDURE — 77080 DXA BONE DENSITY AXIAL: CPT

## 2025-07-18 PROCEDURE — 77063 BREAST TOMOSYNTHESIS BI: CPT
